# Patient Record
Sex: FEMALE | Race: WHITE | NOT HISPANIC OR LATINO | URBAN - METROPOLITAN AREA
[De-identification: names, ages, dates, MRNs, and addresses within clinical notes are randomized per-mention and may not be internally consistent; named-entity substitution may affect disease eponyms.]

---

## 2017-03-06 DIAGNOSIS — Z85.3 PERSONAL HISTORY OF MALIGNANT NEOPLASM OF BREAST: ICD-10-CM

## 2017-03-06 RX ORDER — ANASTROZOLE 1 MG/1
1 TABLET ORAL DAILY
Qty: 90 TABLET | Refills: 0 | Status: SHIPPED | OUTPATIENT
Start: 2017-03-06 | End: 2017-06-02

## 2017-04-18 ENCOUNTER — TRANSFERRED RECORDS (OUTPATIENT)
Dept: HEALTH INFORMATION MANAGEMENT | Facility: CLINIC | Age: 62
End: 2017-04-18

## 2017-05-23 ENCOUNTER — TRANSFERRED RECORDS (OUTPATIENT)
Dept: HEALTH INFORMATION MANAGEMENT | Facility: CLINIC | Age: 62
End: 2017-05-23

## 2017-06-02 DIAGNOSIS — Z85.3 PERSONAL HISTORY OF MALIGNANT NEOPLASM OF BREAST: ICD-10-CM

## 2017-06-02 RX ORDER — ANASTROZOLE 1 MG/1
1 TABLET ORAL DAILY
Qty: 30 TABLET | Refills: 0 | Status: SHIPPED | OUTPATIENT
Start: 2017-06-02 | End: 2017-06-23

## 2017-06-02 NOTE — TELEPHONE ENCOUNTER
Request for Anastrozole 1 mg tablet #90. Per OV note 12/23/16:    PLAN:   1.  Continue Arimidex  2. Switch to tamoxifen in 6 months.  3. Contact us sooner as necessary.     Lefty Matthew MD    Erx sent for #30 day supply, next office visit 6/23/17.    Anastrozole 1 mg     Last Written Prescription Date: 3/6/17  Last Fill Quantity: 90,  # refills: 0   Last Office Visit with [facility], [facility] or [facility] prescribing provider: 12/23/16 with Dr. Matthew

## 2017-06-23 ENCOUNTER — ONCOLOGY VISIT (OUTPATIENT)
Dept: ONCOLOGY | Facility: CLINIC | Age: 62
End: 2017-06-23
Attending: INTERNAL MEDICINE
Payer: COMMERCIAL

## 2017-06-23 VITALS
WEIGHT: 93.7 LBS | TEMPERATURE: 97.5 F | RESPIRATION RATE: 18 BRPM | BODY MASS INDEX: 17.24 KG/M2 | HEIGHT: 62 IN | OXYGEN SATURATION: 100 % | HEART RATE: 76 BPM

## 2017-06-23 DIAGNOSIS — Z85.3 PERSONAL HISTORY OF MALIGNANT NEOPLASM OF BREAST: ICD-10-CM

## 2017-06-23 PROCEDURE — 99213 OFFICE O/P EST LOW 20 MIN: CPT | Mod: ZF

## 2017-06-23 PROCEDURE — 99214 OFFICE O/P EST MOD 30 MIN: CPT | Mod: ZP | Performed by: INTERNAL MEDICINE

## 2017-06-23 RX ORDER — LIDOCAINE 50 MG/G
PATCH TOPICAL DAILY PRN
COMMUNITY
Start: 2016-07-29

## 2017-06-23 RX ORDER — CLOBETASOL PROPIONATE 0.5 MG/G
OINTMENT TOPICAL DAILY PRN
COMMUNITY
Start: 2016-04-01

## 2017-06-23 RX ORDER — ONDANSETRON 4 MG/1
4 TABLET, ORALLY DISINTEGRATING ORAL EVERY 6 HOURS PRN
Status: ON HOLD | COMMUNITY
Start: 2017-03-20 | End: 2024-01-01

## 2017-06-23 RX ORDER — TAMOXIFEN CITRATE 20 MG/1
20 TABLET ORAL DAILY
Qty: 90 TABLET | Refills: 3 | Status: SHIPPED | OUTPATIENT
Start: 2017-06-23 | End: 2018-07-11

## 2017-06-23 ASSESSMENT — PAIN SCALES - GENERAL: PAINLEVEL: NO PAIN (0)

## 2017-06-23 NOTE — LETTER
"6/23/2017       RE: Abigail Lam  [address]  [facility] [address]     Dear Colleague,    Thank you for referring your patient, Abigail Lam, to the [facility]. Please see a copy of my visit note below.    DIAGNOSIS:  History of Stage I, node negative, breast cancer.  It was multifocal.  ER, [place] positive, grade 1, negative for angiolymphatic invasion. One tumor was 2 cm, another was 0.5.  Had a low Oncotype score of 7.  She was treated with bilateral mastectomy, oophorectomy, started Tamoxifen in 2006 and took it for 5 years.  She has now been on extended endocrine therapy with Arimidex since June 2011.  She had been receiving q 6 month zolendronate but this was stopped Dec 2013 as it might have been causing her rib musculskeletal symptoms      INTERIM HISTORY:  Ms. Lam returns after 6 months.  Since I last saw her, she's done well.  Her GERD has been better.  She did not have the surgical procedure.    However, DEXA today shows decreasing bone density and she would like to switch to tamoxifen for this.  We talked about endometrial monitoring, and she understands it has limited benefit.    She also wonders if she has textured implants, will contact her reconstructive surgeon.    ROS remarkable for the persistent LUQ pain today.  It is fairly dull, and is not worse.       PHYSICAL EXAMINATION:   Pulse 76  Temp 97.5  F (36.4  C) (Oral)  Resp 18  Ht 1.575 m (5' 2.01\")  Wt 42.5 kg (93 lb 11.2 oz)  SpO2 100%  BMI 17.13 kg/m2    GENERAL:  She looks well today.  HEENT: Benign  LUNGS: Clear\  BACK: No tenderness  HEART: Normal hs  CHEST: Implants intact and in place.    ABDOMEN:  BS normal.  No mass or tenderness  NEURO: Grossly intact  EXTREMITIES: Normal    LABS:  Has outside DEXA today.  Shows decreasing bone density consistent with worsening osteoporosis.    PROBLEMS:   1.  History of ER positive breast cancer on extended therapy since about 02/2006. OncotypeDx score = 7  2.  " Musculoskeletal rib pain.   3.  Low bone density/Osteoporosis on outside scan   4. GERD better now.  5. LLQ abdominal pain - uncertain etiology     IMPRESSION:  Ms. Lam is interested in switching back to tamoxifen - especially given her bone density.  She will discuss with her gynecologist on whether she needs US to follow endometrium.h to tamoxifen.    PLAN:   1.  Switch to tamoxifen  2. RTC in 6 months.  3. Contact us sooner as necessary.    Lefty Matthew MD    Again, thank you for allowing me to participate in the care of your patient.      Sincerely,    Lefty Matthew MD

## 2017-06-23 NOTE — PROGRESS NOTES
"DIAGNOSIS:  History of Stage I, node negative, breast cancer.  It was multifocal.  ER, [place] positive, grade 1, negative for angiolymphatic invasion. One tumor was 2 cm, another was 0.5.  Had a low Oncotype score of 7.  She was treated with bilateral mastectomy, oophorectomy, started Tamoxifen in 2006 and took it for 5 years.  She has now been on extended endocrine therapy with Arimidex since June 2011.  She had been receiving q 6 month zolendronate but this was stopped Dec 2013 as it might have been causing her rib musculskeletal symptoms      INTERIM HISTORY:  Ms. Lam returns after 6 months.  Since I last saw her, she's done well.  Her GERD has been better.  She did not have the surgical procedure.    However, DEXA today shows decreasing bone density and she would like to switch to tamoxifen for this.  We talked about endometrial monitoring, and she understands it has limited benefit.    She also wonders if she has textured implants, will contact her reconstructive surgeon.    ROS remarkable for the persistent LUQ pain today.  It is fairly dull, and is not worse.       PHYSICAL EXAMINATION:   Pulse 76  Temp 97.5  F (36.4  C) (Oral)  Resp 18  Ht 1.575 m (5' 2.01\")  Wt 42.5 kg (93 lb 11.2 oz)  SpO2 100%  BMI 17.13 kg/m2    GENERAL:  She looks well today.  HEENT: Benign  LUNGS: Clear\  BACK: No tenderness  HEART: Normal hs  CHEST: Implants intact and in place.    ABDOMEN:  BS normal.  No mass or tenderness  NEURO: Grossly intact  EXTREMITIES: Normal    LABS:  Has outside DEXA today.  Shows decreasing bone density consistent with worsening osteoporosis.    PROBLEMS:   1.  History of ER positive breast cancer on extended therapy since about 02/2006. OncotypeDx score = 7  2.  Musculoskeletal rib pain.   3.  Low bone density/Osteoporosis on outside scan   4. GERD better now.  5. LLQ abdominal pain - uncertain etiology     IMPRESSION:  Ms. Lam is interested in switching back to tamoxifen - especially given her bone " density.  She will discuss with her gynecologist on whether she needs US to follow endometrium.h to tamoxifen.    PLAN:   1.  Switch to tamoxifen  2. RTC in 6 months.  3. Contact us sooner as necessary.    Lefty Matthew MD

## 2017-06-23 NOTE — LETTER
Date:June 27, 2017      Patient was self referred, no letter generated. Do not send.        [facility] Health Information

## 2017-06-23 NOTE — MR AVS SNAPSHOT
"              After Visit Summary   2017    Abigail Lam    MRN: [id number]           Patient Information     Date Of Birth          1955        Visit Information        Provider Department      2017 10:00 AM Lefty Matthew MD [facility]        Today's Diagnoses     Personal history of malignant neoplasm of breast           Follow-ups after your visit        Follow-up notes from your care team     Return in about 6 months (around 2017).      Who to contact     If you have questions or need follow up information about today's clinic visit or your schedule please contact [facility] directly at [phone].  Normal or non-critical lab and imaging results will be communicated to you by MyChart, letter or phone within 4 business days after the clinic has received the results. If you do not hear from us within 7 days, please contact the clinic through MyChart or phone. If you have a critical or abnormal lab result, we will notify you by phone as soon as possible.  Submit refill requests through [employer] or call your pharmacy and they will forward the refill request to us. Please allow 3 business days for your refill to be completed.          Additional Information About Your Visit        MyChart Information     Ideabove lets you send messages to your doctor, view your test results, renew your prescriptions, schedule appointments and more. To sign up, go to [email or web address] . Click on \"Log in\" on the left side of the screen, which will take you to the Welcome page. Then click on \"Sign up Now\" on the right side of the page.     You will be asked to enter the access code listed below, as well as some personal information. Please follow the directions to create your username and password.     Your access code is: [email or web address]  Expires: 2017  6:31 AM     Your access code will  in 90 days. If you need help or a new code, please call your [place] clinic " "or [phone].        Care EveryWhere ID     This is your Care EveryWhere ID. This could be used by other organizations to access your [place] medical records  [id number]        Your Vitals Were     Pulse Temperature Respirations Height Pulse Oximetry BMI (Body Mass Index)    76 97.5  F (36.4  C) (Oral) 18 1.575 m (5' 2.01\") 100% 17.13 kg/m2       Blood Pressure from Last 3 Encounters:   12/18/15 110/70   06/12/15 109/66   12/19/14 122/72    Weight from Last 3 Encounters:   06/23/17 42.5 kg (93 lb 11.2 oz)   12/23/16 40.6 kg (89 lb 9.6 oz)   09/30/16 40.6 kg (89 lb 9.6 oz)              Today, you had the following     No orders found for display         Today's Medication Changes          These changes are accurate as of: 6/23/17 11:09 AM.  If you have any questions, ask your nurse or doctor.               These medicines have changed or have updated prescriptions.        Dose/Directions    Fiber 0.52 G Caps   This may have changed:  Another medication with the same name was removed. Continue taking this medication, and follow the directions you see here.   Changed by:  Lefty Matthew MD        Dose:  1 tablet   Take 1 tablet by mouth daily   Refills:  0       ROLAIDS PO   This may have changed:  Another medication with the same name was removed. Continue taking this medication, and follow the directions you see here.   Changed by:  Gustabo Kemp MD        Dose:  750 mg   Take 750 mg by mouth 3 times daily   Refills:  0         Stop taking these medicines if you haven't already. Please contact your care team if you have questions.     anastrozole 1 MG tablet   Commonly known as:  ARIMIDEX   Stopped by:  Lefty Matthew MD                Where to get your medicines      These medications were sent to [facility] [address] [address] [facility] [address], [place] [address]     Phone:  [phone]     tamoxifen 20 MG tablet                Primary Care Provider    [facility] Other [facility] "                Equal Access to Services     [facility] : Hadii sofiya patel marciojuhi Neelimaali, watiffanyda luqterri, qasybilta kakaminidangelo harper. So [facility] [phone].    ATENCIÓN: Si habla español, tiene a cespedes disposición servicios gratuitos de asistencia lingüística. Llame al [phone].    We comply with applicable federal civil rights laws and [place] laws. We do not discriminate on the basis of race, color, national origin, age, disability sex, sexual orientation or gender identity.            Thank you!     Thank you for choosing [facility]IC  for your care. Our goal is always to provide you with excellent care. Hearing back from our patients is one way we can continue to improve our services. Please take a few minutes to complete the written survey that you may receive in the mail after your visit with us. Thank you!             Your Updated Medication List - Protect others around you: Learn how to safely use, store and throw away your medicines at www.disposemymeds.org.          This list is accurate as of: 6/23/17 11:09 AM.  Always use your most recent med list.                   Brand Name Dispense Instructions for use Diagnosis    ATIVAN 0.5 MG tablet   Generic drug:  LORazepam      Take 1 tablet by mouth as needed. Sleep/anxiety        atorvastatin 10 MG tablet    LIPITOR    90 tablet    Take 1 tablet (10 mg) by mouth every other day    Personal history of malignant neoplasm of breast       CARAFATE 1 GM/10ML suspension   Generic drug:  sucralfate      Take 1 g by mouth as needed. Up to 4 times a day        clobetasol 0.05 % ointment    TEMOVATE     Apply topically daily as needed        Fiber 0.52 G Caps      Take 1 tablet by mouth daily        lidocaine 5 % Patch    LIDODERM     as needed        LYSINE PO      Take  by mouth See Admin Instructions.        omeprazole-sodium bicarbonate  MG Caps per capsule           ondansetron 4 MG ODT tab     ZOFRAN-ODT     Take 4 mg by mouth every 6 hours as needed        ROLAIDS PO      Take 750 mg by mouth 3 times daily        SUDAFED PO      Take  by mouth as needed.        tamoxifen 20 MG tablet    NOLVADEX    90 tablet    Take 1 tablet (20 mg) by mouth daily    Personal history of malignant neoplasm of breast       VAGIFEM 10 MCG Tabs vaginal tablet   Generic drug:  estradiol      1 tablet every other day.        VITAMIN B-12 IJ      Inject 1,000 mcg as directed Monthly        vitamin D 2000 UNITS tablet      Take 1 tablet by mouth daily.        ZANTAC PO      Take 150 mg by mouth daily

## 2017-12-22 ENCOUNTER — ONCOLOGY VISIT (OUTPATIENT)
Dept: ONCOLOGY | Facility: CLINIC | Age: 62
End: 2017-12-22
Attending: INTERNAL MEDICINE
Payer: COMMERCIAL

## 2017-12-22 VITALS
TEMPERATURE: 97.7 F | RESPIRATION RATE: 16 BRPM | HEART RATE: 98 BPM | BODY MASS INDEX: 17.19 KG/M2 | DIASTOLIC BLOOD PRESSURE: 82 MMHG | HEIGHT: 62 IN | SYSTOLIC BLOOD PRESSURE: 147 MMHG | WEIGHT: 93.4 LBS | OXYGEN SATURATION: 99 %

## 2017-12-22 DIAGNOSIS — Z85.3 PERSONAL HISTORY OF MALIGNANT NEOPLASM OF BREAST: Primary | ICD-10-CM

## 2017-12-22 PROCEDURE — 40000114 ZZH STATISTIC NO CHARGE CLINIC VISIT

## 2017-12-22 PROCEDURE — 99214 OFFICE O/P EST MOD 30 MIN: CPT | Mod: ZP | Performed by: INTERNAL MEDICINE

## 2017-12-22 ASSESSMENT — PAIN SCALES - GENERAL: PAINLEVEL: MILD PAIN (3)

## 2017-12-22 NOTE — MR AVS SNAPSHOT
"              After Visit Summary   2017    Abigail Lam    MRN: [id number]           Patient Information     Date Of Birth          1955        Visit Information        Provider Department      2017 10:30 AM Lefty Matthew MD [facility]        Today's Diagnoses     Personal history of malignant neoplasm of breast    -  1       Follow-ups after your visit        Follow-up notes from your care team     Return in about 1 year (around 2018).      Who to contact     If you have questions or need follow up information about today's clinic visit or your schedule please contact [facility] directly at [phone].  Normal or non-critical lab and imaging results will be communicated to you by MyChart, letter or phone within 4 business days after the clinic has received the results. If you do not hear from us within 7 days, please contact the clinic through MyChart or phone. If you have a critical or abnormal lab result, we will notify you by phone as soon as possible.  Submit refill requests through [employer] or call your pharmacy and they will forward the refill request to us. Please allow 3 business days for your refill to be completed.          Additional Information About Your Visit        MyChart Information     [employer] lets you send messages to your doctor, view your test results, renew your prescriptions, schedule appointments and more. To sign up, go to [email or web address] . Click on \"Log in\" on the left side of the screen, which will take you to the Welcome page. Then click on \"Sign up Now\" on the right side of the page.     You will be asked to enter the access code listed below, as well as some personal information. Please follow the directions to create your username and password.     Your access code is: [email or web address]  Expires: 3/8/2018  6:30 AM     Your access code will  in 90 days. If you need help or a new code, please call your [place] " "clinic or [phone].        Care EveryWhere ID     This is your Care EveryWhere ID. This could be used by other organizations to access your [place] medical records  [id number]        Your Vitals Were     Pulse Temperature Respirations Height Pulse Oximetry BMI (Body Mass Index)    98 97.7  F (36.5  C) (Oral) 16 1.575 m (5' 2.01\") 99% 17.08 kg/m2       Blood Pressure from Last 3 Encounters:   12/22/17 147/82   12/18/15 110/70   06/12/15 109/66    Weight from Last 3 Encounters:   12/22/17 42.4 kg (93 lb 6.4 oz)   06/23/17 42.5 kg (93 lb 11.2 oz)   12/23/16 40.6 kg (89 lb 9.6 oz)              Today, you had the following     No orders found for display       Primary Care Provider    None Specified       No primary provider on file.        Equal Access to Services     [facility] : Hadii sofiya Alvarez, wamitra nelson, boston kaalmada isis, dangelo lindo . So [facility] [phone].    ATENCIÓN: Si habla español, tiene a cespedes disposición servicios gratuitos de asistencia lingüística. Rhianna al [phone].    We comply with applicable federal civil rights laws and [place] laws. We do not discriminate on the basis of race, color, national origin, age, disability, sex, sexual orientation, or gender identity.            Thank you!     Thank you for choosing [facility]ER [facility]  for your care. Our goal is always to provide you with excellent care. Hearing back from our patients is one way we can continue to improve our services. Please take a few minutes to complete the written survey that you may receive in the mail after your visit with us. Thank you!             Your Updated Medication List - Protect others around you: Learn how to safely use, store and throw away your medicines at www.disposemymeds.org.          This list is accurate as of: 12/22/17 12:30 PM.  Always use your most recent med list.                   Brand Name Dispense Instructions for use Diagnosis "    ATIVAN 0.5 MG tablet   Generic drug:  LORazepam      Take 1 tablet by mouth as needed. Sleep/anxiety        atorvastatin 10 MG tablet    LIPITOR    90 tablet    Take 1 tablet (10 mg) by mouth every other day    Personal history of malignant neoplasm of breast       CARAFATE 1 GM/10ML suspension   Generic drug:  sucralfate      Take 1 g by mouth as needed. Up to 4 times a day        clobetasol 0.05 % ointment    TEMOVATE     Apply topically daily as needed        Fiber 0.52 G Caps      Take 1 tablet by mouth daily        lidocaine 5 % Patch    LIDODERM     as needed        LYSINE PO      Take  by mouth See Admin Instructions.        omeprazole-sodium bicarbonate  MG Caps per capsule           ondansetron 4 MG ODT tab    ZOFRAN-ODT     Take 4 mg by mouth every 6 hours as needed        ROLAIDS PO      Take 750 mg by mouth 3 times daily        SUDAFED PO      Take  by mouth as needed.        tamoxifen 20 MG tablet    NOLVADEX    90 tablet    Take 1 tablet (20 mg) by mouth daily    Personal history of malignant neoplasm of breast       VAGIFEM 10 MCG Tabs vaginal tablet   Generic drug:  estradiol      1 tablet every other day.        VITAMIN B-12 IJ      Inject 1,000 mcg as directed Monthly        vitamin D 2000 UNITS tablet      Take 1 tablet by mouth daily.        ZANTAC PO      Take 150 mg by mouth daily
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
## 2017-12-22 NOTE — LETTER
Date:December 29, 2017      Patient was self referred, no letter generated. Do not send.        [facility] Health Information
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
## 2018-02-12 ENCOUNTER — CARE COORDINATION (OUTPATIENT)
Dept: ONCOLOGY | Facility: CLINIC | Age: 63
End: 2018-02-12

## 2018-02-12 NOTE — PROGRESS NOTES
Patient called to arrange a follow-up appointment with Dr Matthew in six months from last visit December 22,17. Patient is requesting Nohelia 15, 18 at 10:30 and have held time and will send a message to scheduling with held time in Dr Matthew's schedule as requested. Answered all patient's questions and verbalized understanding. Liss Campos RN, BSN.

## 2018-05-21 ENCOUNTER — TRANSFERRED RECORDS (OUTPATIENT)
Dept: HEALTH INFORMATION MANAGEMENT | Facility: CLINIC | Age: 63
End: 2018-05-21

## 2018-06-15 ENCOUNTER — ONCOLOGY VISIT (OUTPATIENT)
Dept: ONCOLOGY | Facility: CLINIC | Age: 63
End: 2018-06-15
Attending: INTERNAL MEDICINE
Payer: COMMERCIAL

## 2018-06-15 VITALS
TEMPERATURE: 97.9 F | WEIGHT: 93.13 LBS | BODY MASS INDEX: 17.14 KG/M2 | DIASTOLIC BLOOD PRESSURE: 79 MMHG | HEIGHT: 62 IN | HEART RATE: 89 BPM | SYSTOLIC BLOOD PRESSURE: 130 MMHG | OXYGEN SATURATION: 98 % | RESPIRATION RATE: 16 BRPM

## 2018-06-15 DIAGNOSIS — Z85.3 PERSONAL HISTORY OF MALIGNANT NEOPLASM OF BREAST: Primary | ICD-10-CM

## 2018-06-15 PROCEDURE — 99214 OFFICE O/P EST MOD 30 MIN: CPT | Mod: ZP | Performed by: INTERNAL MEDICINE

## 2018-06-15 PROCEDURE — G0463 HOSPITAL OUTPT CLINIC VISIT: HCPCS | Mod: ZF

## 2018-06-15 RX ORDER — FLUTICASONE PROPIONATE 50 MCG
1 SPRAY, SUSPENSION (ML) NASAL DAILY PRN
COMMUNITY

## 2018-06-15 RX ORDER — HYOSCYAMINE SULFATE 0.125 MG
TABLET ORAL
Status: ON HOLD | COMMUNITY
Start: 2018-06-04 | End: 2024-01-01

## 2018-06-15 ASSESSMENT — PAIN SCALES - GENERAL: PAINLEVEL: NO PAIN (0)

## 2018-06-15 NOTE — PROGRESS NOTES
"DIAGNOSIS:  History of Stage I, node negative, breast cancer.  It was multifocal.  ER, VA positive, grade 1, negative for angiolymphatic invasion. One tumor was 2 cm, another was 0.5.  Had a low Oncotype score of 7.  She was treated with bilateral mastectomy, oophorectomy, started Tamoxifen in 2006 and took it for 5 years.  She has now been on extended endocrine therapy with Arimidex since June 2011.  She had been receiving q 6 month zolendronate but this was stopped Dec 2013 as it might have been causing her rib musculskeletal symptoms.  She switched to tamoxifen in June 2017.     INTERIM HISTORY:  Ms. Lam returns after 6 months.  Since I last saw her, she had vaginal US which showed increasing endometrial thickness to 9mm.  She also has fibroids.  She has no bleeding.  Because of this her gynecologist is planning a vaginal hysterectomy for next week.    She also has learned that she has textured implants.  She saw Dr. Ugarte who suggested he could replace them at the same time as her hysterectomy.  She is reluctant to do this.  She does think her left breast is a little larger.    GERD is now well controlled.    Her  is retiring this year.  She will then be spending more time in [place].    Otherwise no new symptoms.      ROS otherwise unremarkable.       PHYSICAL EXAMINATION:   /79  Pulse 89  Temp 97.9  F (36.6  C) (Oral)  Resp 16  Ht 1.575 m (5' 2\")  Wt 42.2 kg (93 lb 2 oz)  SpO2 98%  Breastfeeding? No  BMI 17.03 kg/m2    GENERAL:  She looks well today. No distress  HEENT: Benign  LUNGS: Clear  BACK: No tenderness to palpation.  Subjective tenderness right low back off midline, not in bone.  HEART: Normal HS, no murmur  CHEST: Implants intact and in place.  I don't see any swelling, but there is a little difference in her lower pole of the left breast  ABDOMEN:  BS normal.  No mass or tenderness  NEURO: Grossly intact  EXTREMITIES: Normal    LABS:  None drawn.    PROBLEMS:   1.  History of ER " positive breast cancer on extended therapy since about 02/2006. OncotypeDx score = 7  2.  Musculoskeletal rib pain.   3.  Low bone density/Osteoporosis on outside scan   4. H/O GERD better now.  5. Increased endometrial thickness planning hysterectomy  6. Textured implants     IMPRESSION:  Ms. Lam is tolerating the tamoxifen.  We will get a breast MRI as a screen    PLAN:   1. Continue tamoxifen  2. RTC in 6 months.  3. Contact us sooner as necessary.  4. Gynecologist to perform hysterectomy  5. Schedule breast MRI for screening    Lefty Matthew MD

## 2018-06-15 NOTE — LETTER
"6/15/2018       RE: Abigail Lam  [address]  [facility] [address]     Dear Colleague,    Thank you for referring your patient, Abigail Lam, to the [facility]. Please see a copy of my visit note below.    DIAGNOSIS:  History of Stage I, node negative, breast cancer.  It was multifocal.  ER, NM positive, grade 1, negative for angiolymphatic invasion. One tumor was 2 cm, another was 0.5.  Had a low Oncotype score of 7.  She was treated with bilateral mastectomy, oophorectomy, started Tamoxifen in 2006 and took it for 5 years.  She has now been on extended endocrine therapy with Arimidex since June 2011.  She had been receiving q 6 month zolendronate but this was stopped Dec 2013 as it might have been causing her rib musculskeletal symptoms.  She switched to tamoxifen in June 2017.     INTERIM HISTORY:  Ms. Lam returns after 6 months.  Since I last saw her, she had vaginal US which showed increasing endometrial thickness to 9mm.  She also has fibroids.  She has no bleeding.  Because of this her gynecologist is planning a vaginal hysterectomy for next week.    She also has learned that she has textured implants.  She saw Dr. Ugarte who suggested he could replace them at the same time as her hysterectomy.  She is reluctant to do this.  She does think her left breast is a little larger.    GERD is now well controlled.    Her  is retiring this year.  She will then be spending more time in [place].    Otherwise no new symptoms.      ROS otherwise unremarkable.       PHYSICAL EXAMINATION:   /79  Pulse 89  Temp 97.9  F (36.6  C) (Oral)  Resp 16  Ht 1.575 m (5' 2\")  Wt 42.2 kg (93 lb 2 oz)  SpO2 98%  Breastfeeding? No  BMI 17.03 kg/m2    GENERAL:  She looks well today. No distress  HEENT: Benign  LUNGS: Clear  BACK: No tenderness to palpation.  Subjective tenderness right low back off midline, not in bone.  HEART: Normal HS, no murmur  CHEST: Implants intact and in place.  I " don't see any swelling, but there is a little difference in her lower pole of the left breast  ABDOMEN:  BS normal.  No mass or tenderness  NEURO: Grossly intact  EXTREMITIES: Normal    LABS:  None drawn.    PROBLEMS:   1.  History of ER positive breast cancer on extended therapy since about 02/2006. OncotypeDx score = 7  2.  Musculoskeletal rib pain.   3.  Low bone density/Osteoporosis on outside scan   4. H/O GERD better now.  5. Increased endometrial thickness planning hysterectomy  6. Textured implants     IMPRESSION:  Ms. Lam is tolerating the tamoxifen.  We will get a breast MRI as a screen    PLAN:   1. Continue tamoxifen  2. RTC in 6 months.  3. Contact us sooner as necessary.  4. Gynecologist to perform hysterectomy  5. Schedule breast MRI for screening    Lefty Matthew MD    Again, thank you for allowing me to participate in the care of your patient.      Sincerely,    Lefty Matthew MD

## 2018-06-15 NOTE — MR AVS SNAPSHOT
After Visit Summary   6/15/2018    Abigail Lam    MRN: [id number]           Patient Information     Date Of Birth          1955        Visit Information        Provider Department      6/15/2018 10:30 AM Lefty Matthew MD [facility]        Today's Diagnoses     Personal history of malignant neoplasm of breast    -  1       Follow-ups after your visit        Follow-up notes from your care team     Return in about 6 months (around 12/15/2018).      Your next 10 appointments already scheduled     Aug 06, 2018  4:00 PM CDT   (Arrive by 3:30 PM)   MR BREAST BILATERAL W/O & W CONTRAST with [address] [facility] ([facility])    [address] [address] [phone]           Take your medicines as usual, unless your doctor tells you not to. Bring a list of your current medicines to your exam (including vitamins, minerals and over-the-counter drugs).  The timing of your exam may depend on the start of your last period. If you re in menopause, you may have the exam anytime.  Please bring any previous mammograms or breast MRIs from other facilities to the MRI dept. Do not mail these items to us.   You will have IV contrast for this exam.  You do not need to do anything special to prepare.  The MRI machine uses a strong magnet. Please wear clothes without metal (snaps, zippers). A sweatsuit works well, or we may give you a hospital gown.  Please remove any body piercings and hair extensions before you arrive. You will also remove watches, jewelry, hairpins, wallets, dentures, partial dental plates and hearing aids. You may wear contact lenses, and you may be able to wear your rings. We have a safe place to keep your personal items, but it is safer to leave them at home.  **IMPORTANT** THE INSTRUCTIONS BELOW ARE ONLY FOR THOSE PATIENTS WHO HAVE BEEN PRESCRIBED SEDATION OR GENERAL ANESTHESIA DURING THEIR MRI  PROCEDURE:  IF YOUR DOCTOR PRESCRIBED ORAL SEDATION (take medicine to help you relax during your exam):   You must get the medicine from your doctor (oral medication) before you arrive. Bring the medicine to the exam. Do not take it at home. You ll be told when to take it upon arriving for your exam.   Arrive one hour early. Bring someone who can take you home after the test. Your medicine will make you sleepy. After the exam, you may not drive, take a bus or take a taxi by yourself.  IF YOUR DOCTOR PRESCRIBED IV SEDATION:   Arrive one hour early. Bring someone who can take you home after the test. Your medicine will make you sleepy. After the exam, you may not drive, take a bus or take a taxi by yourself.   No eating 6 hours before your exam. You may have clear liquids up until 4 hours before your exam. (Clear liquids include water, clear tea, black coffee and fruit juice without pulp.)  IF YOUR DOCTOR PRESCRIBED ANESTHESIA (be asleep for your exam):   Arrive 1 1/2 hours early. Bring someone who can take you home after the test. You may not drive, take a bus or take a taxi by yourself.   No eating 8 hours before your exam. You may have clear liquids up until 4 hours before your exam. (Clear liquids include water, clear tea, black coffee and fruit juice without pulp.)   You will spend four to five hours in the recovery room.  If you have any questions, please contact your Imaging Department exam site.            Dec 21, 2018 10:00 AM CST   (Arrive by 9:45 AM)   Return Visit with Lefty Matthew MD   [facility] ([facility])    [address] [place] [address]  [address]  [facility] [address] [phone]              Future tests that were ordered for you today     Open Future Orders        Priority Expected Expires Ordered    MRI Breast bilateral w & w/o contrast Routine  5/15/2019 6/15/2018            Who to contact     If you have questions or need follow up information about  "today's clinic visit or your schedule please contact [facility] directly at [phone].  Normal or non-critical lab and imaging results will be communicated to you by be2hart, letter or phone within 4 business days after the clinic has received the results. If you do not hear from us within 7 days, please contact the clinic through be2hart or phone. If you have a critical or abnormal lab result, we will notify you by phone as soon as possible.  Submit refill requests through [email or web address] or call your pharmacy and they will forward the refill request to us. Please allow 3 business days for your refill to be completed.          Additional Information About Your Visit        be2hart Information     [email or web address] lets you send messages to your doctor, view your test results, renew your prescriptions, schedule appointments and more. To sign up, go to [email or web address] . Click on \"Log in\" on the left side of the screen, which will take you to the Welcome page. Then click on \"Sign up Now\" on the right side of the page.     You will be asked to enter the access code listed below, as well as some personal information. Please follow the directions to create your username and password.     Your access code is: [email or web address]  Expires: 2018  6:30 AM     Your access code will  in 90 days. If you need help or a new code, please call your [facility] clinic or [phone].        Care EveryWhere ID     This is your Care EveryWhere ID. This could be used by other organizations to access your [facility] medical records  [id number]        Your Vitals Were     Pulse Temperature Respirations Height Pulse Oximetry Breastfeeding?    89 97.9  F (36.6  C) (Oral) 16 1.575 m (5' 2\") 98% No    BMI (Body Mass Index)                   17.03 kg/m2            Blood Pressure from Last 3 Encounters:   06/15/18 130/79   17 147/82   12/18/15 110/70    Weight from Last 3 Encounters:   06/15/18 42.2 kg (93 lb 2 oz) "   12/22/17 42.4 kg (93 lb 6.4 oz)   06/23/17 42.5 kg (93 lb 11.2 oz)               Primary Care Provider Office Phone # Fax #    Lizette Finn [phone]       [facility] [address]  [facility] [address]        Equal Access to Services     NATHANAEL DIAZ : Hadii aad ku hadasho Soomaali, waaxda luqadaha, qaybta kaalmada adeegyada, waxay idiin hayaan adeeg khjazzysh lakavon . So [facility] [phone].    ATENCIÓN: Si habla español, tiene a cespedes disposición servicios gratuitos de asistencia lingüística. Rhianna al [phone].    We comply with applicable federal civil rights laws and [place] laws. We do not discriminate on the basis of race, color, national origin, age, disability, sex, sexual orientation, or gender identity.            Thank you!     Thank you for choosing [facility]  for your care. Our goal is always to provide you with excellent care. Hearing back from our patients is one way we can continue to improve our services. Please take a few minutes to complete the written survey that you may receive in the mail after your visit with us. Thank you!             Your Updated Medication List - Protect others around you: Learn how to safely use, store and throw away your medicines at www.disposemymeds.org.          This list is accurate as of 6/15/18 11:34 AM.  Always use your most recent med list.                   Brand Name Dispense Instructions for use Diagnosis    ATIVAN 0.5 MG tablet   Generic drug:  LORazepam      Take 1 tablet by mouth as needed. Sleep/anxiety        atorvastatin 10 MG tablet    LIPITOR    90 tablet    Take 1 tablet (10 mg) by mouth every other day    Personal history of malignant neoplasm of breast       CARAFATE 1 GM/10ML suspension   Generic drug:  sucralfate      Take 1 g by mouth as needed. Up to 4 times a day        clobetasol 0.05 % ointment    TEMOVATE     Apply topically daily as needed        Fiber 0.52 g Caps      Take 1 tablet  by mouth daily        fluticasone 50 MCG/ACT spray    FLONASE     Spray 1 Inhaler in nostril        hyoscyamine 0.125 MG tablet    ANASPAZ/LEVSIN          lidocaine 5 % Patch    LIDODERM     as needed        * LYSINE PO      Take  by mouth See Admin Instructions.        * Lysine 500 MG Caps      Take 500 mg by mouth        omeprazole-sodium bicarbonate  MG Caps per capsule           ondansetron 4 MG ODT tab    ZOFRAN-ODT     Take 4 mg by mouth every 6 hours as needed        ROLAIDS PO      Take 750 mg by mouth 3 times daily        SUDAFED PO      Take  by mouth as needed.        tamoxifen 20 MG tablet    NOLVADEX    90 tablet    Take 1 tablet (20 mg) by mouth daily    Personal history of malignant neoplasm of breast       VAGIFEM 10 MCG Tabs vaginal tablet   Generic drug:  estradiol      1 tablet every other day.        VITAMIN B-12 IJ      Inject 1,000 mcg as directed Monthly        vitamin D 2000 units tablet      Take 1 tablet by mouth daily.        ZANTAC PO      Take 150 mg by mouth daily        * Notice:  This list has 2 medication(s) that are the same as other medications prescribed for you. Read the directions carefully, and ask your doctor or other care provider to review them with you.

## 2018-06-15 NOTE — NURSING NOTE
"Oncology Rooming Note    Nohelia 15, 2018 10:36 AM   Abigail Lam is a 63 year old female who presents for:    Chief Complaint   Patient presents with     Oncology Clinic Visit     Return: Breast [place]     Initial Vitals: /79  Pulse 89  Temp 97.9  F (36.6  C) (Oral)  Resp 16  Ht 1.575 m (5' 2\")  Wt 42.2 kg (93 lb 2 oz)  SpO2 98%  Breastfeeding? No  BMI 17.03 kg/m2 Estimated body mass index is 17.03 kg/(m^2) as calculated from the following:    Height as of this encounter: 1.575 m (5' 2\").    Weight as of this encounter: 42.2 kg (93 lb 2 oz). Body surface area is 1.36 meters squared.  No Pain (0) Comment: Data Unavailable   No LMP recorded. Patient is not currently having periods (Reason: UNKNOWN).  Allergies reviewed: Yes  Medications reviewed: Yes    Medications: Medication refills not needed today.  Pharmacy name entered into [employer]:    [id number] [place] [facility] - [place], [place] - [address] [facility] - [place] - [address] [facility] [address] [place] [address] - [address] [facility] [address] [facility] & [facility]    Clinical concerns: new concerns will discuss with Dr. Tiff Matthew was notified.    10 minutes for nursing intake (face to face time)     Karan Ohara CMA              "

## 2018-06-22 ENCOUNTER — TRANSFERRED RECORDS (OUTPATIENT)
Dept: HEALTH INFORMATION MANAGEMENT | Facility: CLINIC | Age: 63
End: 2018-06-22

## 2018-07-11 ENCOUNTER — TELEPHONE (OUTPATIENT)
Dept: ONCOLOGY | Facility: CLINIC | Age: 63
End: 2018-07-11

## 2018-07-11 DIAGNOSIS — Z85.3 PERSONAL HISTORY OF MALIGNANT NEOPLASM OF BREAST: ICD-10-CM

## 2018-07-11 RX ORDER — TAMOXIFEN CITRATE 20 MG/1
20 TABLET ORAL DAILY
Qty: 90 TABLET | Refills: 3 | Status: SHIPPED | OUTPATIENT
Start: 2018-07-11 | End: 2018-08-13

## 2018-07-11 NOTE — TELEPHONE ENCOUNTER
[facility] Call Center    Phone Message    May a detailed message be left on voicemail: no    Reason for Call: Other: pharmacy calling on the status of the refill for tamoxifen (NOLVADEX) 20 MG tablet. Please call them back or respond to the fax     Action Taken: Message routed to:  [facility] ([facility]

## 2018-07-26 ENCOUNTER — ONCOLOGY VISIT (OUTPATIENT)
Dept: ONCOLOGY | Facility: CLINIC | Age: 63
End: 2018-07-26
Attending: OBSTETRICS & GYNECOLOGY
Payer: COMMERCIAL

## 2018-07-26 VITALS
TEMPERATURE: 96.9 F | RESPIRATION RATE: 18 BRPM | BODY MASS INDEX: 16.9 KG/M2 | WEIGHT: 92.37 LBS | OXYGEN SATURATION: 98 % | HEART RATE: 99 BPM

## 2018-07-26 DIAGNOSIS — G89.18 ACUTE POST-OPERATIVE PAIN: ICD-10-CM

## 2018-07-26 DIAGNOSIS — Z85.3 PERSONAL HISTORY OF MALIGNANT NEOPLASM OF BREAST: Primary | ICD-10-CM

## 2018-07-26 PROCEDURE — 99203 OFFICE O/P NEW LOW 30 MIN: CPT | Mod: ZP | Performed by: OBSTETRICS & GYNECOLOGY

## 2018-07-26 PROCEDURE — G0463 HOSPITAL OUTPT CLINIC VISIT: HCPCS | Mod: ZF

## 2018-07-26 ASSESSMENT — PAIN SCALES - GENERAL: PAINLEVEL: MILD PAIN (3)

## 2018-07-26 NOTE — NURSING NOTE
"Oncology Rooming Note    July 26, 2018 10:46 AM   Abigail Lam is a 63 year old female who presents for:    Chief Complaint   Patient presents with     Oncology Clinic Visit     Return for      Initial Vitals: Pulse 99  Temp 96.9  F (36.1  C) (Oral)  Resp 18  Wt 41.9 kg (92 lb 6 oz)  SpO2 98%  BMI 16.9 kg/m2 Estimated body mass index is 16.9 kg/(m^2) as calculated from the following:    Height as of 6/15/18: 1.575 m (5' 2\").    Weight as of this encounter: 41.9 kg (92 lb 6 oz). Body surface area is 1.35 meters squared.  Mild Pain (3) Comment: Data Unavailable   No LMP recorded. Patient is not currently having periods (Reason: UNKNOWN).  Allergies reviewed: Yes  Medications reviewed: Yes    Medications: Medication refills not needed today.  Pharmacy name entered into [employer]:    [id number] [place] [facility] [place], [place] - [address] [facility] - [place], [place] - [address] [facility] [id number] [place] [address] - [address] [place] [address] [address] [facility]    Clinical concerns: results  Krystle was notified.    8  minutes for nursing intake (face to face time)     Humaira Leiva MA              "

## 2018-07-26 NOTE — PATIENT INSTRUCTIONS
Get records from recent surgery at [facility]  Follow up with benign gyn in 6 months

## 2018-07-26 NOTE — NURSING NOTE
No need to return to gyn oncology  See benign gyn in 6 months  MIN signed to get records from recent surgery  8/6/18  Records requested from surgery 4-5 weeks ago at [facility]

## 2018-07-26 NOTE — PROGRESS NOTES
Gynecology Oncology Clinic  Consult Notes on Referred Patient    RE: Abigail Lam  : 1955  DANIEL: 2018    Abigail Lam is a very pleasant 63 year old woman with a recent diagnosis of thickened endometrial stripe.  Given these findings, she was subsequently desired a second opinion and follow up at the Gynecologic Cancer Clinic. She is a friend of Dr. Pritchard.    HPI: abigail presents to clinic today feeling well. She reports intermittent vaginal bleeding, pt is post-menopausal. No abnormal discharge. She was concerned with possible hernia along RLQ following surgery she had at [facility]. She is otherwise doing well. She is eating and drinking well. No bowel or urinary issues. RLQ pain when having bowel movement. She has noted that she is passing gas more frequently, she is taking simethicone and colace. No lower extremity pain or edema. No numbness or tingling.     2005: history of ER, TN+ grade 1 right DCIS and concurrent lobular carcinoma in situ of the left breast, s/p a bilateral mastectomy     On tamoxifen from  to .   Zoledronate q 6 months stopped in Dec 2013 due to msk symptoms  On Arimidex from 2011 to 2017  Started on tamoxifen in 2017: TVUS with endometrial thicnkess of 5.5 mm    1/10/2018: seen for recent US showed thickened endometrial lining    2018: US with thickened endometrial lining (9.1mm) Uterus 6.9x3.9x4.8, 4 fibroids-stable, ovaries surgically absent  - EBM benign    2018: LAVH/BS by Dr. Triana.   Intraoperative findings:  1. Small, mobile uterus.  2. Surgically absent fallopian tubes and ovaries bilaterally.  3. Small filmy adhesions between sigmoid colon and left pelvic sidewall.  4. Normal upper abdominal survey.  5. Normal appearing appendix.  6. Gross pathologic evaluation of the endometrial cavity demonstrated no focal abnormalities, no evidence of grossly thickened endometrium.    Waiting for final pathology report from [facility]  however patient told benign.      ObGyn history:    Menarche: 15 y/o  LMP:   Age at menopause: 50  History of OCPs: None  History of HRT: None  Dysplasia: Reports normal pap smears, last performed 2017 and normal per patient       Review of Systems:  Systemic           no weight changes; no fever; no chills; no night sweats; no appetite changes  Skin           no rashes, or lesions  Eye           no irritation; no changes in vision  Melissa-Laryngeal           no dysphagia; no hoarseness   Pulmonary    no cough; no shortness of breath  Cardiovascular    no chest pain; no palpitations  Gastrointestinal    no diarrhea; no constipation; no abdominal pain; no changes in bowel  habits; no blood in stool  Genitourinary   no urinary frequency; no urinary urgency; no dysuria; no pain; no abnormal vaginal discharge; no abnormal vaginal bleeding  Breast    no breast discharge; no breast changes; no breast pain  Musculoskeletal    no myalgias; no arthralgias; no back pain  Psychiatric           no depressed mood; no anxiety    Hematologic           no tender lymph nodes; no noticeable swellings or lumps   Endocrine    no hot flashes; no heat/cold intolerance         Neurological   no tremor; no numbness and tingling; no headaches; no difficulty  sleeping    I have reviewed and addressed the patient's review of symptoms for today's visit.     Past Medical History:  Esophageal reflux   Gastroesophageal Reflux   Disorder of bone and cartilage, unspecified       Malignant neoplasm of breast (female), unspecified site   Breast Cancer, Female, right   Scoliosis       Past Surgical History:  laproscopy for infertility workup.     Pelvic laparoscopy (no surgical intervention)   Bilateral MRM's 10/01/06   bilateral   BSO.  05   Salpingo-Oophorectomy   COLONOSCOPY SCREENING ,    diverticulosis   MASTECTOMY   Bilateral with reconstruction   CT CARDIAC CORONARY ARTERIES DUAL READ        Health Maintenance:  Health  Maintenance Due   Topic Date Due     PHQ-2 Q1 YR  1967     TETANUS IMMUNIZATION (SYSTEM ASSIGNED)  1973     HIV SCREEN (SYSTEM ASSIGNED)  1973     HEPATITIS C SCREENING  1973     PAP SCREENING Q3 YR (SYSTEM ASSIGNED)  1976     MAMMO SCREEN Q2 YR (SYSTEM ASSIGNED)  2005     ADVANCE DIRECTIVE PLANNING Q5 YRS  2010     Last Pap Smear:               Result: normal  She has not had a history of abnormal Pap smears.    Last Mammogram: S/p bilateral mastectomy in               Result:       She has had a history of abnormal mammograms.    Last Colonoscopy: 2017              Result: normal                    Last DEXA Scan:               Result: abnormal: osteopenia and taking Zometa, Vit D, and Calcium    Last Diabetes Screening;     Last Thyroid Screenin/20/2013    Last Cholest Screenin/18/2015    Current Medications:   has a current medication list which includes the following prescription(s): atorvastatin, ca carbonate-mag hydroxide, vitamin d, clobetasol, cyanocobalamin, estradiol, fluticasone, hyoscyamine, lidocaine, lorazepam, lysine, lysine, omeprazole-sodium bicarbonate, ondansetron, pseudoephedrine hcl, fiber, ranitidine hcl, sucralfate, and tamoxifen.     Allergies:      Allergies   Allergen Reactions     Diphtheria Toxoid-Tetanus Tox-Thimerosal      Other reaction(s): Edema     Ketorolac      Other reaction(s): Hypertension     Ketorolac Tromethamine      Other reaction(s): Other (See Comments)  Creates high blood pressure     Toradol         Social History:  Social History   Substance Use Topics     Smoking status: Never Smoker     Smokeless tobacco: Never Used     Alcohol use Not on file       History   Drug Use Not on file     Family History:   The patient's family history is notable for:  Family History   Problem Relation Age of Onset     Macular Degeneration Mother      Glaucoma No family hx of      Father: heart disease,  "bladder cancer (85 y/o)  Paternal grandfather: heart attach (73 y/o)  Mother: \"inflammatory breast cancer\" (77 y/o)  Maternal grandmother: Pancreatic or ovarian cancer  Daughter: Grave's disease  No hx of colon, uterine, cervical cancer    Physical Exam:   Pulse 99  Temp 96.9  F (36.1  C) (Oral)  Resp 18  Wt 41.9 kg (92 lb 6 oz)  SpO2 98%  BMI 16.9 kg/m2  Body mass index is 16.9 kg/(m^2).    General Appearance- healthy and alert, no distress  HEENT- no thyromegaly, no palpable nodules or masses   Cardiovascular- regular rate and rhythm, no gallops, rubs or murmurs   Respiratory- lungs clear, no rales, rhonchi or wheezes, normal diaphragmatic excursion  Musculoskeletal- extremities non tender and without edema  Skin- no lesions or rashes   Neurological- normal gait, no gross defects  Psychiatric- appropriate mood and affect           Hematologica- normal cervical, supraclavicular and inguinal lymph nodes  Gastrointestinal- abdomen soft, some inflammation palpable around incision site in RLQ, size of a marble, indentation around incsion site when patient is standing up, not concerning for hernia.   Genitourinary- Pt declined pelvic exam.       Assessment:  Abigail Lam is a 63 year old woman with a new diagnosis of thickened endometrial strip on tamoxifen, now s/p LAVH on 6/22/2018. Waiting on final pathology report from [facility].      Plan:   1.)   Thickened endometrial stripe: pt s/p hysterectomy done by Dr. Triana at [facility]. No evidence of carcinoma based on patient report.   Patient needs to establish care with general ObGyn here in [place]. Reviewed signs and symptoms for when she should contact the clinic or seek additional care. Patient to contact the clinic with any questions or concerns in the interim.     2.) Genetic risk factors were assessed and the patient does not meet the qualifications for a referral.      3.) Labs and/or tests ordered include:  None.      4.) Health maintenance issues " addressed today include: pt up to date with colonoscopy and DEXA scan.     5.) Incision site: instructed patient to ice area and take ibuprofen every 4-6 hours for a few days to alleviate inflammation and discomfort. Pt can massage area as tolerated.     Thank you for allowing us to participate in the care of your patient.       Sincerely,    Sravanthi Dalton MD    Department of Ob/Gyn and Women's Health  Division of Gynecologic Oncology  [facility]  [phone]    I, Renan Collazo, am serving as a scribe to document services personally performed by Sravanthi Dalton MD, based upon my observations and the provider's statements to me. All documentation has been reviewed by the aforementioned doctor prior to being entered into the official medical record.     I have reviewed the above note and agree with the scribe's notation as written.

## 2018-07-26 NOTE — MR AVS SNAPSHOT
After Visit Summary   7/26/2018    Abigail Lam    MRN: [id number]           Patient Information     Date Of Birth          1955        Visit Information        Provider Department      7/26/2018 10:40 AM Sravanthi Dalton MD [facility]        Today's Diagnoses     Personal history of malignant neoplasm of breast    -  1    Acute post-operative pain          Care Instructions    Get records from recent surgery at [facility]  Follow up with benign gyn in 6 months          Follow-ups after your visit        Your next 10 appointments already scheduled     Dec 21, 2018 10:00 AM CST   (Arrive by 9:45 AM)   Return Visit with Lefty Matthew MD   [facility]    [address]  [address]  [place] [address] [phone]              Who to contact     If you have questions or need follow up information about today's clinic visit or your schedule please contact [facility] directly at [phone].  Normal or non-critical lab and imaging results will be communicated to you by MyChart, letter or phone within 4 business days after the clinic has received the results. If you do not hear from us within 7 days, please contact the clinic through MyChart or phone. If you have a critical or abnormal lab result, we will notify you by phone as soon as possible.  Submit refill requests through [employer] or call your pharmacy and they will forward the refill request to us. Please allow 3 business days for your refill to be completed.          Additional Information About Your Visit        Care EveryWhere ID     This is your Care EveryWhere ID. This could be used by other organizations to access your [place] medical records  [id number]        Your Vitals Were     Pulse Temperature Respirations Pulse Oximetry BMI (Body Mass Index)       99 96.9  F (36.1  C) (Oral) 18 98% 16.9 kg/m2        Blood  Pressure from Last 3 Encounters:   06/15/18 130/79   12/22/17 147/82   12/18/15 110/70    Weight from Last 3 Encounters:   07/26/18 41.9 kg (92 lb 6 oz)   06/15/18 42.2 kg (93 lb 2 oz)   12/22/17 42.4 kg (93 lb 6.4 oz)              Today, you had the following     No orders found for display       Primary Care Provider Office Phone # Fax #    Lizette Finn [phone]       [facility] [address] [facility] [address]        Equal Access to Services     [facility] : Hadii aad ku hadasho Sonathanali, waaxda luqadaha, qaybta kaalmada adeegyada, dangelo lindo . So [facility] [phone].    ATENCIÓN: Si habla español, tiene a cespedes disposición servicios gratuitos de asistencia lingüística. [facility] [phone].    We comply with applicable federal civil rights laws and [place] laws. We do not discriminate on the basis of race, color, national origin, age, disability, sex, sexual orientation, or gender identity.            Thank you!     Thank you for choosing [facility]  for your care. Our goal is always to provide you with excellent care. Hearing back from our patients is one way we can continue to improve our services. Please take a few minutes to complete the written survey that you may receive in the mail after your visit with us. Thank you!             Your Updated Medication List - Protect others around you: Learn how to safely use, store and throw away your medicines at www.disposemymeds.org.          This list is accurate as of 7/26/18 11:59 PM.  Always use your most recent med list.                   Brand Name Dispense Instructions for use Diagnosis    ATIVAN 0.5 MG tablet   Generic drug:  LORazepam      Take 1 tablet by mouth as needed. Sleep/anxiety        atorvastatin 10 MG tablet    LIPITOR    90 tablet    Take 1 tablet (10 mg) by mouth every other day    Personal history of malignant neoplasm of breast       CARAFATE 1 GM/10ML  suspension   Generic drug:  sucralfate      Take 1 g by mouth as needed. Up to 4 times a day        clobetasol 0.05 % ointment    TEMOVATE     Apply topically daily as needed        Fiber 0.52 g Caps      Take 1 tablet by mouth daily        fluticasone 50 MCG/ACT spray    FLONASE     Spray 1 Inhaler in nostril        hyoscyamine 0.125 MG tablet    ANASPAZ/LEVSIN          lidocaine 5 % Patch    LIDODERM     as needed        * LYSINE PO      Take  by mouth See Admin Instructions.        * Lysine 500 MG Caps      Take 500 mg by mouth        omeprazole-sodium bicarbonate  MG Caps per capsule           ondansetron 4 MG ODT tab    ZOFRAN-ODT     Take 4 mg by mouth every 6 hours as needed        ROLAIDS PO      Take 750 mg by mouth 3 times daily        SUDAFED PO      Take  by mouth as needed.        VAGIFEM 10 MCG Tabs vaginal tablet   Generic drug:  estradiol      1 tablet every other day.        VITAMIN B-12 IJ      Inject 1,000 mcg as directed Monthly        vitamin D 2000 units tablet      Take 1 tablet by mouth daily.        ZANTAC PO      Take 150 mg by mouth daily        * Notice:  This list has 2 medication(s) that are the same as other medications prescribed for you. Read the directions carefully, and ask your doctor or other care provider to review them with you.

## 2018-07-26 NOTE — LETTER
2018       RE: Abigail Lam  [address]  [place] [address]     Dear Colleague,    Thank you for referring your patient, Abigail Lam, to the [facility]. Please see a copy of my visit note below.    Gynecology Oncology Clinic  Consult Notes on Referred Patient    RE: Abigail Lam  : 1955  DANIEL: 2018    Abigail Lam is a very pleasant 63 year old woman with a recent diagnosis of thickened endometrial stripe.  Given these findings, she was subsequently desired a second opinion and follow up at the Gynecologic Cancer Clinic. She is a friend of Dr. Pritchard.    HPI: abigail presents to clinic today feeling well. She reports intermittent vaginal bleeding, pt is post-menopausal. No abnormal discharge. She was concerned with possible hernia along RLQ following surgery she had at [facility]. She is otherwise doing well. She is eating and drinking well. No bowel or urinary issues. RLQ pain when having bowel movement. She has noted that she is passing gas more frequently, she is taking simethicone and colace. No lower extremity pain or edema. No numbness or tingling.     2005: history of ER, TN+ grade 1 right DCIS and concurrent lobular carcinoma in situ of the left breast, s/p a bilateral mastectomy     On tamoxifen from  to .   Zoledronate q 6 months stopped in Dec 2013 due to msk symptoms  On Arimidex from 2011 to 2017  Started on tamoxifen in 2017: TVUS with endometrial thicnkess of 5.5 mm    1/10/2018: seen for recent US showed thickened endometrial lining    2018: US with thickened endometrial lining (9.1mm) Uterus 6.9x3.9x4.8, 4 fibroids-stable, ovaries surgically absent  - EBM benign    2018: LAVH/BS by Dr. Triana.   Intraoperative findings:  1. Small, mobile uterus.  2. Surgically absent fallopian tubes and ovaries bilaterally.  3. Small filmy adhesions between sigmoid colon and left pelvic sidewall.  4. Normal upper  abdominal survey.  5. Normal appearing appendix.  6. Gross pathologic evaluation of the endometrial cavity demonstrated no focal abnormalities, no evidence of grossly thickened endometrium.    Waiting for final pathology report from [facility] however patient told benign.      ObGyn history:    Menarche: 17 y/o  LMP:   Age at menopause: 50  History of OCPs: None  History of HRT: None  Dysplasia: Reports normal pap smears, last performed 2017 and normal per patient     I have reviewed and addressed the patient's review of symptoms for today's visit.     Past Medical History:  Esophageal reflux   Gastroesophageal Reflux   Disorder of bone and cartilage, unspecified       Malignant neoplasm of breast (female), unspecified site   Breast Cancer, Female, right   Scoliosis       Past Surgical History:  laproscopy for infertility workup.     Pelvic laparoscopy (no surgical intervention)   Bilateral MRM's 10/01/06   bilateral   BSO.  05   Salpingo-Oophorectomy   COLONOSCOPY SCREENING ,    diverticulosis   MASTECTOMY   Bilateral with reconstruction   CT CARDIAC CORONARY ARTERIES DUAL READ        Health Maintenance:  Health Maintenance Due   Topic Date Due     PHQ-2 Q1 YR  1967     TETANUS IMMUNIZATION (SYSTEM ASSIGNED)  1973     HIV SCREEN (SYSTEM ASSIGNED)  1973     HEPATITIS C SCREENING  1973     PAP SCREENING Q3 YR (SYSTEM ASSIGNED)  1976     MAMMO SCREEN Q2 YR (SYSTEM ASSIGNED)  2005     ADVANCE DIRECTIVE PLANNING Q5 YRS  2010     Last Pap Smear:               Result: normal  She has not had a history of abnormal Pap smears.    Last Mammogram: S/p bilateral mastectomy in               Result:       She has had a history of abnormal mammograms.    Last Colonoscopy: 2017              Result: normal                    Last DEXA Scan: 2017              Result: abnormal: osteopenia and taking Zometa, Vit D, and Calcium    Last Diabetes  "Screening;     Last Thyroid Screenin/20/2013    Last Cholest Screenin/18/2015    Current Medications:   has a current medication list which includes the following prescription(s): atorvastatin, ca carbonate-mag hydroxide, vitamin d, clobetasol, cyanocobalamin, estradiol, fluticasone, hyoscyamine, lidocaine, lorazepam, lysine, lysine, omeprazole-sodium bicarbonate, ondansetron, pseudoephedrine hcl, fiber, ranitidine hcl, sucralfate, and tamoxifen.     Allergies:      Allergies   Allergen Reactions     Diphtheria Toxoid-Tetanus Tox-Thimerosal      Other reaction(s): Edema     Ketorolac      Other reaction(s): Hypertension     Ketorolac Tromethamine      Other reaction(s): Other (See Comments)  Creates high blood pressure     Toradol         Social History:  Social History   Substance Use Topics     Smoking status: Never Smoker     Smokeless tobacco: Never Used     Alcohol use Not on file       History   Drug Use Not on file     Family History:   The patient's family history is notable for:  Family History   Problem Relation Age of Onset     Macular Degeneration Mother      Glaucoma No family hx of      Father: heart disease, bladder cancer (83 y/o)  Paternal grandfather: heart attach (71 y/o)  Mother: \"inflammatory breast cancer\" (75 y/o)  Maternal grandmother: Pancreatic or ovarian cancer  Daughter: Grave's disease  No hx of colon, uterine, cervical cancer    Physical Exam:   Pulse 99  Temp 96.9  F (36.1  C) (Oral)  Resp 18  Wt 41.9 kg (92 lb 6 oz)  SpO2 98%  BMI 16.9 kg/m2  Body mass index is 16.9 kg/(m^2).    General Appearance- healthy and alert, no distress  HEENT- no thyromegaly, no palpable nodules or masses   Cardiovascular- regular rate and rhythm, no gallops, rubs or murmurs   Respiratory- lungs clear, no rales, rhonchi or wheezes, normal diaphragmatic excursion  Musculoskeletal- extremities non tender and without edema  Skin- no lesions or rashes   Neurological- normal gait, no " gross defects  Psychiatric- appropriate mood and affect           Hematologica- normal cervical, supraclavicular and inguinal lymph nodes  Gastrointestinal- abdomen soft, some inflammation palpable around incision site in RLQ, size of a marble, indentation around incsion site when patient is standing up, not concerning for hernia.   Genitourinary- Pt declined pelvic exam.       Assessment:  Abigail Lam is a 63 year old woman with a new diagnosis of thickened endometrial strip on tamoxifen, now s/p LAVH on 6/22/2018. Waiting on final pathology report from [facility].      Plan:   1.)   Thickened endometrial stripe: pt s/p hysterectomy done by Dr. Triana at [facility]. No evidence of carcinoma based on patient report.   Patient needs to establish care with general ObGyn here in [place]. Reviewed signs and symptoms for when she should contact the clinic or seek additional care. Patient to contact the clinic with any questions or concerns in the interim.     2.) Genetic risk factors were assessed and the patient does not meet the qualifications for a referral.      3.) Labs and/or tests ordered include:  None.      4.) Health maintenance issues addressed today include: pt up to date with colonoscopy and DEXA scan.     5.) Incision site: instructed patient to ice area and take ibuprofen every 4-6 hours for a few days to alleviate inflammation and discomfort. Pt can massage area as tolerated.     Thank you for allowing us to participate in the care of your patient.       Sincerely,    Sravanthi Dalton MD    Department of Ob/Gyn and Women's Health  Division of Gynecologic Oncology  [facility]  [phone]    BAILEY, Renan Collazo, am serving as a scribe to document services personally performed by Sravanthi Dalton MD, based upon my observations and the provider's statements to me. All documentation has been reviewed by the aforementioned doctor prior to being entered into the  official medical record.     I have reviewed the above note and agree with the scribe's notation as written.    Again, thank you for allowing me to participate in the care of your patient.      Sincerely,    Sravanthi Dalton MD

## 2018-08-06 ENCOUNTER — RADIANT APPOINTMENT (OUTPATIENT)
Dept: MRI IMAGING | Facility: CLINIC | Age: 63
End: 2018-08-06
Attending: INTERNAL MEDICINE
Payer: COMMERCIAL

## 2018-08-06 DIAGNOSIS — Z85.3 PERSONAL HISTORY OF MALIGNANT NEOPLASM OF BREAST: ICD-10-CM

## 2018-08-06 RX ORDER — GADOBUTROL 604.72 MG/ML
7.5 INJECTION INTRAVENOUS ONCE
Status: COMPLETED | OUTPATIENT
Start: 2018-08-06 | End: 2018-08-06

## 2018-08-06 RX ADMIN — GADOBUTROL 4.5 ML: 604.72 INJECTION INTRAVENOUS at 16:02

## 2018-08-06 NOTE — DISCHARGE INSTRUCTIONS
MRI Contrast Discharge Instructions    The IV contrast you received today will pass out of your body in your  urine. This will happen in the next 24 hours. You will not feel this process.  Your urine will not change color.    Drink at least 4 extra glasses of water or juice today (unless your doctor  has restricted your fluids). This reduces the stress on your kidneys.  You may take your regular medicines.    If you are on dialysis: It is best to have dialysis today.    If you have a reaction: Most reactions happen right away. If you have  any new symptoms after leaving the hospital (such as hives or swelling),  call your hospital at the correct number below. Or call your family doctor.  If you have breathing distress or wheezing, call 911.    Special instructions: ***    I have read and understand the above information.    Signature:______________________________________ Date:___________    Staff:__________________________________________ Date:___________     Time:__________    [place] [facility] Departments:    ___[place]: [phone]  ___[facility]: [phone]  ___[place]: [phone] ___[facility]: [phone]  ___[facility]: [phone]  ___[facility]: [phone]  ___[phone]  ___[facility]: [phone]  ___[phone]

## 2018-08-09 ENCOUNTER — CARE COORDINATION (OUTPATIENT)
Dept: ONCOLOGY | Facility: CLINIC | Age: 63
End: 2018-08-09

## 2018-08-09 NOTE — PROGRESS NOTES
"Called patient's home/cell and left a VM to return call to discuss Dr Matthew's recommendations \"No specific concerns around implants.\" Liss Campos RN, BSN  Spoke to patient with Dr Matthew's recommendations after reviewing MRI breast and sent a refill for Tamoxifen to [facility] on [address] in [facility] as requested. Refill had been completed previously on July 11th, 2018, but sent it again.  Answered all patient's questions and verbalized understanding. Liss Campos RN, BSN.     "

## 2018-08-13 DIAGNOSIS — Z85.3 PERSONAL HISTORY OF MALIGNANT NEOPLASM OF BREAST: ICD-10-CM

## 2018-08-13 RX ORDER — TAMOXIFEN CITRATE 20 MG/1
20 TABLET ORAL DAILY
Qty: 90 TABLET | Refills: 3 | Status: SHIPPED | OUTPATIENT
Start: 2018-08-13 | End: 2019-06-14

## 2018-08-14 PROBLEM — G89.18 ACUTE POST-OPERATIVE PAIN: Status: ACTIVE | Noted: 2018-08-14

## 2018-11-16 ENCOUNTER — CARE COORDINATION (OUTPATIENT)
Dept: ONCOLOGY | Facility: CLINIC | Age: 63
End: 2018-11-16

## 2018-11-16 NOTE — PROGRESS NOTES
Faxed MIN for patient's record Faxed to [phone] to HIM for Genetic testing to be mailed to patient. Liss Campos RN, BSN

## 2018-12-21 ENCOUNTER — ONCOLOGY VISIT (OUTPATIENT)
Dept: ONCOLOGY | Facility: CLINIC | Age: 63
End: 2018-12-21
Attending: INTERNAL MEDICINE
Payer: COMMERCIAL

## 2018-12-21 VITALS
TEMPERATURE: 97.2 F | HEART RATE: 75 BPM | RESPIRATION RATE: 16 BRPM | WEIGHT: 93.7 LBS | SYSTOLIC BLOOD PRESSURE: 132 MMHG | DIASTOLIC BLOOD PRESSURE: 75 MMHG | BODY MASS INDEX: 17.14 KG/M2 | OXYGEN SATURATION: 99 %

## 2018-12-21 DIAGNOSIS — Z85.3 PERSONAL HISTORY OF MALIGNANT NEOPLASM OF BREAST: Primary | ICD-10-CM

## 2018-12-21 PROCEDURE — G0463 HOSPITAL OUTPT CLINIC VISIT: HCPCS | Mod: ZF

## 2018-12-21 PROCEDURE — 99214 OFFICE O/P EST MOD 30 MIN: CPT | Mod: ZP | Performed by: INTERNAL MEDICINE

## 2018-12-21 ASSESSMENT — PAIN SCALES - GENERAL: PAINLEVEL: MODERATE PAIN (5)

## 2018-12-21 NOTE — PROGRESS NOTES
DIAGNOSIS:  History of Stage I, node negative, breast cancer.  It was multifocal.  ER, [place] positive, grade 1, negative for angiolymphatic invasion. One tumor was 2 cm, another was 0.5.  Had a low Oncotype score of 7.  She was treated with bilateral mastectomy, oophorectomy, started Tamoxifen in 2006 and took it for 5 years.  She has now been on extended endocrine therapy with Arimidex since June 2011.  She had been receiving q 6 month zolendronate but this was stopped Dec 2013 as it might have been causing her rib musculskeletal symptoms.  She switched to tamoxifen in June 2017.     INTERIM HISTORY:  Ms. aLm returns after 6 months.  She had a hysterectomy at the end of June 2018 at [facility] in [place].  The pathology was said to be negative    She also had breast MRI which showed no pathology.  She had a consultation with Dr. Ruiz who told her she could have prophylactic exchange of the implant, but that was up to her.  We went over her MRI and there is little tissue between the implant and skin.  Dr. Ruiz feels it would be enough to do an exchange as an attempt to remove the capsule could be difficult.    GERD is now well controlled.  Thinks its managed well after her endoscopy    SH: Her  is enjoying his halfway.  Her daughter is expecting.  They will be spending 6 weeks in [place] before the delivery.  She is here through the week.    Otherwise no new symptoms.      ROS otherwise unremarkable.  She does  Have minor GI symptoms of gas and constipation from tamoxifen.,       PHYSICAL EXAMINATION:   /75   Pulse 75   Temp 97.2  F (36.2  C) (Oral)   Resp 16   Wt 42.5 kg (93 lb 11.1 oz)   SpO2 99%   BMI 17.14 kg/m      GENERAL:  She looks well today. No distress  HEENT: Benign  LUNGS: Clear  BACK: No tenderness to palpation.  Subjective tenderness right low back off midline, not in bone.  HEART: Normal HS, no murmur  CHEST: Implants intact and in place.  I don't see any swelling, but there  is a little difference in her lower pole of the left breast.  There are no masses.  ABDOMEN:  BS normal.  No mass or tenderness  NEURO: Grossly intact  EXTREMITIES: Normal    LABS:  None drawn.    PROBLEMS:   1.  History of ER positive breast cancer on extended therapy since about 02/2006. OncotypeDx score = 7  2.  Musculoskeletal rib pain.   3.  Low bone density/Osteoporosis on outside scan   4. H/O GERD better now.  5. Increased endometrial thickness planning hysterectomy  6. Textured implants  7. S/p total lap[aroscopic hysterectomy.     IMPRESSION:  Ms. Lam is tolerating the tamoxifen.  She is considering her options for the implants, but we discussed waiting until there was a need to do surgery, such as a capsular rupture.  I do not think she needs MRI screening, she can be managed by exam.    She wants to continue tamoxifen.  We talked about a 10 or 5 mg dose as recently presented at [facility].    PLAN:   1. Continue tamoxifen  2. RTC in 6 months.  3. Contact us sooner as necessary.    Lefty Matthew MD

## 2018-12-21 NOTE — LETTER
12/21/2018       RE: Abigail Lam  [address]  [facility] [address]     Dear Colleague,    Thank you for referring your patient, Abigail Lam, to the [facility]. Please see a copy of my visit note below.    DIAGNOSIS:  History of Stage I, node negative, breast cancer.  It was multifocal.  ER, [place] positive, grade 1, negative for angiolymphatic invasion. One tumor was 2 cm, another was 0.5.  Had a low Oncotype score of 7.  She was treated with bilateral mastectomy, oophorectomy, started Tamoxifen in 2006 and took it for 5 years.  She has now been on extended endocrine therapy with Arimidex since June 2011.  She had been receiving q 6 month zolendronate but this was stopped Dec 2013 as it might have been causing her rib musculskeletal symptoms.  She switched to tamoxifen in June 2017.     INTERIM HISTORY:  Ms. Lam returns after 6 months.  She had a hysterectomy at the end of June 2018 at [facility] in [place].  The pathology was said to be negative    She also had breast MRI which showed no pathology.  She had a consultation with Dr. Ruiz who told her she could have prophylactic exchange of the implant, but that was up to her.  We went over her MRI and there is little tissue between the implant and skin.  Dr. Ruiz feels it would be enough to do an exchange as an attempt to remove the capsule could be difficult.    GERD is now well controlled.  Thinks its managed well after her endoscopy    SH: Her  is enjoying his jail.  Her daughter is expecting.  They will be spending 6 weeks in [place] before the delivery.  She is here through the week.    Otherwise no new symptoms.      ROS otherwise unremarkable.  She does  Have minor GI symptoms of gas and constipation from tamoxifen.,       PHYSICAL EXAMINATION:   /75   Pulse 75   Temp 97.2  F (36.2  C) (Oral)   Resp 16   Wt 42.5 kg (93 lb 11.1 oz)   SpO2 99%   BMI 17.14 kg/m       GENERAL:  She looks well today. No  distress  HEENT: Benign  LUNGS: Clear  BACK: No tenderness to palpation.  Subjective tenderness right low back off midline, not in bone.  HEART: Normal HS, no murmur  CHEST: Implants intact and in place.  I don't see any swelling, but there is a little difference in her lower pole of the left breast.  There are no masses.  ABDOMEN:  BS normal.  No mass or tenderness  NEURO: Grossly intact  EXTREMITIES: Normal    LABS:  None drawn.    PROBLEMS:   1.  History of ER positive breast cancer on extended therapy since about 02/2006. OncotypeDx score = 7  2.  Musculoskeletal rib pain.   3.  Low bone density/Osteoporosis on outside scan   4. H/O GERD better now.  5. Increased endometrial thickness planning hysterectomy  6. Textured implants  7. S/p total lap[aroscopic hysterectomy.     IMPRESSION:  Ms. Lam is tolerating the tamoxifen.  She is considering her options for the implants, but we discussed waiting until there was a need to do surgery, such as a capsular rupture.  I do not think she needs MRI screening, she can be managed by exam.    She wants to continue tamoxifen.  We talked about a 10 or 5 mg dose as recently presented at [facility].    PLAN:   1. Continue tamoxifen  2. RTC in 6 months.  3. Contact us sooner as necessary.    Lefty Matthew MD

## 2018-12-21 NOTE — NURSING NOTE
"Oncology Rooming Note    December 21, 2018 10:07 AM   Abigail Lam is a 63 year old female who presents for:    Chief Complaint   Patient presents with     Oncology Clinic Visit     Breast Ca      Initial Vitals: /75   Pulse 75   Temp 97.2  F (36.2  C) (Oral)   Resp 16   Wt 42.5 kg (93 lb 11.1 oz)   SpO2 99%   BMI 17.14 kg/m   Estimated body mass index is 17.14 kg/m  as calculated from the following:    Height as of 6/15/18: 1.575 m (5' 2\").    Weight as of this encounter: 42.5 kg (93 lb 11.1 oz). Body surface area is 1.36 meters squared.  Moderate Pain (5) Comment: Data Unavailable   No LMP recorded. Patient is not currently having periods (Reason: UNKNOWN).  Allergies reviewed: Yes  Medications reviewed: Yes    Medications: MEDICATION REFILLS NEEDED TODAY. Provider was notified.  Pharmacy name entered into [employer]:    [id number] - [place], [place] - [address] [facility] - [place], [place] - [address] [facility] [id number] [place] [address] - [address] [facility] [address] AT [facility]    Clinical concerns: Poss refill  Tiff was notified.    8  minutes for nursing intake (face to face time)     Humaira Leiva MA              "

## 2019-06-14 ENCOUNTER — ONCOLOGY VISIT (OUTPATIENT)
Dept: ONCOLOGY | Facility: CLINIC | Age: 64
End: 2019-06-14
Attending: INTERNAL MEDICINE
Payer: COMMERCIAL

## 2019-06-14 VITALS
RESPIRATION RATE: 14 BRPM | SYSTOLIC BLOOD PRESSURE: 154 MMHG | BODY MASS INDEX: 17.02 KG/M2 | WEIGHT: 92.5 LBS | HEART RATE: 80 BPM | TEMPERATURE: 98 F | HEIGHT: 62 IN | OXYGEN SATURATION: 98 % | DIASTOLIC BLOOD PRESSURE: 87 MMHG

## 2019-06-14 DIAGNOSIS — Z85.3 PERSONAL HISTORY OF MALIGNANT NEOPLASM OF BREAST: ICD-10-CM

## 2019-06-14 PROCEDURE — G0463 HOSPITAL OUTPT CLINIC VISIT: HCPCS | Mod: ZF

## 2019-06-14 PROCEDURE — 99214 OFFICE O/P EST MOD 30 MIN: CPT | Mod: ZP | Performed by: INTERNAL MEDICINE

## 2019-06-14 RX ORDER — TAMOXIFEN CITRATE 20 MG/1
20 TABLET ORAL DAILY
Qty: 90 TABLET | Refills: 3 | Status: SHIPPED | OUTPATIENT
Start: 2019-06-14 | End: 2020-06-19

## 2019-06-14 RX ORDER — ATORVASTATIN CALCIUM 10 MG/1
10 TABLET, FILM COATED ORAL EVERY OTHER DAY
Qty: 90 TABLET | Refills: 3 | Status: SHIPPED | OUTPATIENT
Start: 2019-06-14 | End: 2020-06-19

## 2019-06-14 ASSESSMENT — MIFFLIN-ST. JEOR: SCORE: 922.96

## 2019-06-14 ASSESSMENT — PAIN SCALES - GENERAL: PAINLEVEL: NO PAIN (0)

## 2019-06-14 NOTE — NURSING NOTE
"Oncology Rooming Note    June 14, 2019 11:49 AM   Abigail Lam is a 64 year old female who presents for:    Chief Complaint   Patient presents with     Oncology Clinic Visit     [facility] RETURN- BREAST CA     Initial Vitals: /87 (BP Location: Left arm, Patient Position: Chair, Cuff Size: Adult Regular)   Pulse 80   Temp 98  F (36.7  C) (Oral)   Resp 14   Ht 1.575 m (5' 2.01\")   Wt 42 kg (92 lb 8 oz)   SpO2 98%   BMI 16.91 kg/m   Estimated body mass index is 16.91 kg/m  as calculated from the following:    Height as of this encounter: 1.575 m (5' 2.01\").    Weight as of this encounter: 42 kg (92 lb 8 oz). Body surface area is 1.36 meters squared.  No Pain (0) Comment: Data Unavailable   No LMP recorded. (Menstrual status: UNKNOWN).  Allergies reviewed: Yes  Medications reviewed: Yes    Medications: Medication refills not needed today.  Pharmacy name entered into [employer]:    [id number] [place] [facility] - [place], [place] - [address] [facility] - [place], [place] - [address] [facility] [id number] [place] [address] - [address] [place] [address] LN [address] AT [facility]    Clinical concerns: No new concerns. Matthew was notified.      Adonay Galvan LPN            "

## 2019-06-14 NOTE — LETTER
"6/14/2019       RE: Abigail Lam  [address] [place] [facility] [address]     Dear Colleague,    Thank you for referring your patient, Abigail Lam, to the [facility]. Please see a copy of my visit note below.    DIAGNOSIS:  History of Stage I, node negative, breast cancer.  It was multifocal.  ER, [place] positive, grade 1, negative for angiolymphatic invasion. One tumor was 2 cm, another was 0.5.  Had a low Oncotype score of 7.  She was treated with bilateral mastectomy, oophorectomy, started Tamoxifen in 2006 and took it for 5 years.  She has now been on extended endocrine therapy with Arimidex since June 2011.  She had been receiving q 6 month zolendronate but this was stopped Dec 2013 as it might have been causing her rib musculskeletal symptoms.  She switched to tamoxifen in June 2017.     INTERIM HISTORY:  Ms. Lam returns after 6 months. She had an episode of a mouth ulcer that involved the right side.  She states it started out as an aphthous ulcer, but then progressed.  She was given antibiotics.  She is left with some residual mouth numbness.     She also an episode of severe GERD, but this was an isolated incident.    SH: Her  is enjoying his custodial.  They went to [place] for 6 weeks and is no pending the birth of her first grandchild. Her daughter is in [place]. Of note is that her daughter and her niece got different messages regarding breast cancer risk from Dr. Perkins at [facility].    Otherwise no new symptoms.      ROS otherwise unremarkable.  She does  Have minor GI symptoms of gas and constipation from tamoxifen.,       PHYSICAL EXAMINATION:   /87 (BP Location: Left arm, Patient Position: Chair, Cuff Size: Adult Regular)   Pulse 80   Temp 98  F (36.7  C) (Oral)   Resp 14   Ht 1.575 m (5' 2.01\")   Wt 42 kg (92 lb 8 oz)   SpO2 98%   BMI 16.91 kg/m       GENERAL:  She looks well today. No distress  HEENT: Benign. I do not see a lesion in the mouth.  The subjective " numbness is at the corner of her mouth.  She was not examined in detail otherwise.    LABS:  None drawn.    PROBLEMS:   1.  History of ER positive breast cancer on extended therapy since about 02/2006. OncotypeDx score = 7  2.  Musculoskeletal rib pain.   3.  Low bone density/Osteoporosis on outside scan   4. H/O GERD better now.  5. Increased endometrial thickness planning hysterectomy  6. Textured implants  7. S/p total laparoscopic hysterectomy.  8. S/p mouth ulcer      IMPRESSION:  Ms. Lam is tolerating the tamoxifen. She wants to continue    PLAN:   1. Continue tamoxifen. Lipitor refill this visit  2. RTC in 6 months.  3. Contact us sooner as necessary.    Lefty Matthew MD

## 2019-06-14 NOTE — PROGRESS NOTES
"DIAGNOSIS:  History of Stage I, node negative, breast cancer.  It was multifocal.  ER, [place] positive, grade 1, negative for angiolymphatic invasion. One tumor was 2 cm, another was 0.5.  Had a low Oncotype score of 7.  She was treated with bilateral mastectomy, oophorectomy, started Tamoxifen in 2006 and took it for 5 years.  She has now been on extended endocrine therapy with Arimidex since June 2011.  She had been receiving q 6 month zolendronate but this was stopped Dec 2013 as it might have been causing her rib musculskeletal symptoms.  She switched to tamoxifen in June 2017.     INTERIM HISTORY:  Ms. Lam returns after 6 months. She had an episode of a mouth ulcer that involved the right side.  She states it started out as an aphthous ulcer, but then progressed.  She was given antibiotics.  She is left with some residual mouth numbness.     She also an episode of severe GERD, but this was an isolated incident.    SH: Her  is enjoying his [facility].  They went to [place] for 6 weeks and is no pending the birth of her first grandchild. Her daughter is in [place]. Of note is that her daughter and her niece got different messages regarding breast cancer risk from Dr. Perkins at [facility].    Otherwise no new symptoms.      ROS otherwise unremarkable.  She does  Have minor GI symptoms of gas and constipation from tamoxifen.,       PHYSICAL EXAMINATION:   /87 (BP Location: Left arm, Patient Position: Chair, Cuff Size: Adult Regular)   Pulse 80   Temp 98  F (36.7  C) (Oral)   Resp 14   Ht 1.575 m (5' 2.01\")   Wt 42 kg (92 lb 8 oz)   SpO2 98%   BMI 16.91 kg/m      GENERAL:  She looks well today. No distress  HEENT: Benign. I do not see a lesion in the mouth.  The subjective numbness is at the corner of her mouth.  She was not examined in detail otherwise.    LABS:  None drawn.    PROBLEMS:   1.  History of ER positive breast cancer on extended therapy since about 02/2006. OncotypeDx score = 7  2.  " Musculoskeletal rib pain.   3.  Low bone density/Osteoporosis on outside scan   4. H/O GERD better now.  5. Increased endometrial thickness planning hysterectomy  6. Textured implants  7. S/p total laparoscopic hysterectomy.  8. S/p mouth ulcer      IMPRESSION:  Ms. Lam is tolerating the tamoxifen. She wants to continue    PLAN:   1. Continue tamoxifen. Lipitor refill this visit  2. RTC in 6 months.  3. Contact us sooner as necessary.    Lefty Matthew MD

## 2019-12-20 ENCOUNTER — ONCOLOGY VISIT (OUTPATIENT)
Dept: ONCOLOGY | Facility: CLINIC | Age: 64
End: 2019-12-20
Attending: INTERNAL MEDICINE
Payer: COMMERCIAL

## 2019-12-20 VITALS
OXYGEN SATURATION: 99 % | HEIGHT: 62 IN | HEART RATE: 82 BPM | WEIGHT: 93.3 LBS | BODY MASS INDEX: 17.17 KG/M2 | TEMPERATURE: 98 F

## 2019-12-20 DIAGNOSIS — Z85.3 PERSONAL HISTORY OF MALIGNANT NEOPLASM OF BREAST: Primary | ICD-10-CM

## 2019-12-20 PROCEDURE — G0463 HOSPITAL OUTPT CLINIC VISIT: HCPCS | Mod: ZF

## 2019-12-20 PROCEDURE — 99214 OFFICE O/P EST MOD 30 MIN: CPT | Mod: ZP | Performed by: INTERNAL MEDICINE

## 2019-12-20 RX ORDER — FAMOTIDINE 40 MG/1
40 TABLET, FILM COATED ORAL DAILY
COMMUNITY
Start: 2019-11-09

## 2019-12-20 RX ORDER — CALCIUM CARBONATE 260MG(650)
300 TABLET,CHEWABLE ORAL DAILY
Status: ON HOLD | COMMUNITY
End: 2024-01-01

## 2019-12-20 ASSESSMENT — MIFFLIN-ST. JEOR: SCORE: 926.62

## 2019-12-20 ASSESSMENT — PAIN SCALES - GENERAL: PAINLEVEL: SEVERE PAIN (6)

## 2019-12-20 NOTE — NURSING NOTE
"Oncology Rooming Note    December 20, 2019 9:55 AM   Abigail Lam is a 64 year old female who presents for:    Chief Complaint   Patient presents with     Oncology Clinic Visit     [facility] RETURN; BREAST CANCER     Initial Vitals: Pulse 82   Temp 98  F (36.7  C) (Oral)   Ht 1.575 m (5' 2.01\")   Wt 42.3 kg (93 lb 4.8 oz)   SpO2 99%   BMI 17.06 kg/m   Estimated body mass index is 17.06 kg/m  as calculated from the following:    Height as of this encounter: 1.575 m (5' 2.01\").    Weight as of this encounter: 42.3 kg (93 lb 4.8 oz). Body surface area is 1.36 meters squared.  Severe Pain (6) Comment: Data Unavailable   No LMP recorded. (Menstrual status: UNKNOWN).  Allergies reviewed: Yes  Medications reviewed: Yes     Patient declined a blood pressure at this visit.     Medications: Medication refills not needed today.  Pharmacy name entered into [employer]:    [id number] [place] [facility] - [place], [place] - [address] [facility] - [place], [place] - [address] [id number] [place], [place] - [address] [facility] [address] AT [facility] & [facility]    Clinical concerns: No additional concerns.  Dr. Matthew was notified.      Aretha Baez, [facility]              "

## 2019-12-20 NOTE — LETTER
"12/20/2019       RE: Abigail Lam  [address] [facility] [address]     Dear Colleague,    Thank you for referring your patient, Abigail Lam, to the [facility]. Please see a copy of my visit note below.    DIAGNOSIS:  History of Stage I, node negative, breast cancer.  It was multifocal.  ER, [place] positive, grade 1, negative for angiolymphatic invasion. One tumor was 2 cm, another was 0.5.  Had a low Oncotype score of 7.  She was treated with bilateral mastectomy, oophorectomy, started Tamoxifen in 2006 and took it for 5 years.  She has now been on extended endocrine therapy with Arimidex since June 2011.  She had been receiving q 6 month zolendronate but this was stopped Dec 2013 as it might have been causing her rib musculskeletal symptoms.  She switched to tamoxifen in June 2017.     INTERIM HISTORY:  Ms. Lam returns after 6 months. She has done well. Tamoxifen causes some constipation, but has done well with mg citrate capsules. GERD is well controlled, although she had to switch her PPI.    She has spoken with Dr. Ruiz about her implants. They are textured and will follow at 6 month intervals with him. She is considering a swap from textured to non-textured implants..    SH: Her grandson was born and is 5.5 months. The family lives in [place]    Otherwise no new symptoms.  She does have some concerns about inconsistent recommendations for high risk BC from Nighat De La Vega. No genes identified in family, but sister getting MRI screening and took tamoxifen while patient's daughter not recommended for earlier screening. Suggested she talk with Dr. De La Vega next time her sister goes in to visit.    ROS otherwise unremarkable.       PHYSICAL EXAMINATION:   Ht 1.575 m (5' 2.01\")   Wt 42.3 kg (93 lb 4.8 oz)   BMI 17.06 kg/m       GENERAL:  She looks well today. No distress  HEENT: Benign.   LUNGS: Clear  BREASTS: Implants intact, no lesion  HEART: Normal HS  ABDOMEN: Benign  NEURO: " Intact  EXTREMITIES: NO edema  LABS:  None drawn.    PROBLEMS:   1.  History of ER positive breast cancer on extended therapy since about 02/2006. OncotypeDx score = 7  2.  Textured breast implants  3.  Low bone density/Osteoporosis on outside scan   4. H/O GERD better now.     IMPRESSION:  Ms. Lam is tolerating the tamoxifen better now. She will follow with Dr. Ruiz regarding implants.    PLAN:   1. Continue tamoxifen. Lipitor refill this visit  2. RTC in 6 months.  3. Contact us sooner as necessary.    Lefty Matthew MD

## 2019-12-20 NOTE — PROGRESS NOTES
"DIAGNOSIS:  History of Stage I, node negative, breast cancer.  It was multifocal.  ER, [place] positive, grade 1, negative for angiolymphatic invasion. One tumor was 2 cm, another was 0.5.  Had a low Oncotype score of 7.  She was treated with bilateral mastectomy, oophorectomy, started Tamoxifen in 2006 and took it for 5 years.  She has now been on extended endocrine therapy with Arimidex since June 2011.  She had been receiving q 6 month zolendronate but this was stopped Dec 2013 as it might have been causing her rib musculskeletal symptoms.  She switched to tamoxifen in June 2017.     INTERIM HISTORY:  Ms. Lam returns after 6 months. She has done well. Tamoxifen causes some constipation, but has done well with mg citrate capsules. GERD is well controlled, although she had to switch her PPI.    She has spoken with Dr. Ruiz about her implants. They are textured and will follow at 6 month intervals with him. She is considering a swap from textured to non-textured implants..    SH: Her grandson was born and is 5.5 months. The family lives in [place]    Otherwise no new symptoms.  She does have some concerns about inconsistent recommendations for high risk BC from Nighat De La Vega. No genes identified in family, but sister getting MRI screening and took tamoxifen while patient's daughter not recommended for earlier screening. Suggested she talk with Dr. De La Vega next time her sister goes in to visit.    ROS otherwise unremarkable.       PHYSICAL EXAMINATION:   Ht 1.575 m (5' 2.01\")   Wt 42.3 kg (93 lb 4.8 oz)   BMI 17.06 kg/m      GENERAL:  She looks well today. No distress  HEENT: Benign.   LUNGS: Clear  BREASTS: Implants intact, no lesion  HEART: Normal HS  ABDOMEN: Benign  NEURO: Intact  EXTREMITIES: NO edema  LABS:  None drawn.    PROBLEMS:   1.  History of ER positive breast cancer on extended therapy since about 02/2006. OncotypeDx score = 7  2.  Textured breast implants  3.  Low bone density/Osteoporosis on outside scan "   4. H/O GERD better now.       IMPRESSION:  Ms. Lam is tolerating the tamoxifen better now. She will follow with Dr. Ruiz regarding implants.    PLAN:   1. Continue tamoxifen. Lipitor refill this visit  2. RTC in 6 months.  3. Contact us sooner as necessary.    Lefty Matthew MD

## 2020-06-19 ENCOUNTER — VIRTUAL VISIT (OUTPATIENT)
Dept: ONCOLOGY | Facility: CLINIC | Age: 65
End: 2020-06-19
Attending: INTERNAL MEDICINE
Payer: MEDICARE

## 2020-06-19 DIAGNOSIS — Z85.3 PERSONAL HISTORY OF MALIGNANT NEOPLASM OF BREAST: ICD-10-CM

## 2020-06-19 PROCEDURE — 40001009 ZZH VIDEO/TELEPHONE VISIT; NO CHARGE

## 2020-06-19 PROCEDURE — 99214 OFFICE O/P EST MOD 30 MIN: CPT | Mod: 95 | Performed by: INTERNAL MEDICINE

## 2020-06-19 RX ORDER — ATORVASTATIN CALCIUM 10 MG/1
10 TABLET, FILM COATED ORAL EVERY OTHER DAY
Qty: 90 TABLET | Refills: 3 | Status: SHIPPED | OUTPATIENT
Start: 2020-06-19 | End: 2021-08-03

## 2020-06-19 RX ORDER — TAMOXIFEN CITRATE 20 MG/1
20 TABLET ORAL DAILY
Qty: 90 TABLET | Refills: 3 | Status: SHIPPED | OUTPATIENT
Start: 2020-06-19 | End: 2021-07-28

## 2020-06-19 NOTE — PROGRESS NOTES
"Abigail Lam is a 65 year old female who is being evaluated via a billable video visit.      The patient has been notified of following:     \"This video visit will be conducted via a call between you and your physician/provider. We have found that certain health care needs can be provided without the need for an in-person physical exam.  This service lets us provide the care you need with a video conversation.  If a prescription is necessary we can send it directly to your pharmacy.  If lab work is needed we can place an order for that and you can then stop by our lab to have the test done at a later time.    Video visits are billed at different rates depending on your insurance coverage.  Please reach out to your insurance provider with any questions.    If during the course of the call the physician/provider feels a video visit is not appropriate, you will not be charged for this service.\"    Patient has given verbal consent for Video visit? Yes    Will anyone else be joining your video visit? No          Secondary Video Option (Doximity), send text message to:  [phone]    I have reviewed and updated the patient's allergies and medication list.    Concerns: Patient has no new concerns.      Refills: Tamoxifen and Lipitor      MOLLY Jauregui      DIAGNOSIS:  History of Stage I, node negative, breast cancer.  It was multifocal.  ER, OR positive, grade 1, negative for angiolymphatic invasion. One tumor was 2 cm, another was 0.5.  Had a low Oncotype score of 7.  She was treated with bilateral mastectomy, oophorectomy, started Tamoxifen in 2006 and took it for 5 years.  She has now been on extended endocrine therapy with Arimidex since June 2011.  She had been receiving q 6 month zolendronate but this was stopped Dec 2013 as it might have been causing her rib musculskeletal symptoms.  She switched to tamoxifen in June 2017.      INTERIM HISTORY:  Ms. Lam returns after 6 months. She has seen Dr. Ruiz who " "is recommending replacement of her textured implants in the future, but with COVID, this can't be done now. Her  was on the call today.    She is doing well on tamoxifen. She has learned to manage her constipation. She is have no problems with GERD.    SOCIAL HISTORY: She and her  have been scrupulous about following COVID guidelines. Her children and grandchildren are now visiting from [place], but all have been maintaining social distancing. Her grandson is now about 1 year old.      Otherwise no new symptoms.  Her ROS isotherwise unremarkable.       PHYSICAL EXAMINATION:       GENERAL: Healthy, alert and no distress, she looks well.  EYES: Eyes grossly normal to inspection.  No discharge or erythema, or obvious scleral/conjunctival abnormalities.  RESP: No audible wheeze, cough, or visible cyanosis.  No visible retractions or increased work of breathing.  SKIN: Visible skin clear. No significant rash, abnormal pigmentation or lesions.\",\"  NEURO: Grossly intact.  Mentation and speech appropriate for age.     PROBLEMS:   1.  History of ER positive breast cancer on extended therapy since about 02/2006. OncotypeDx score = 7  2.  Textured breast implants  3.  Low bone density/Osteoporosis on outside scan   4. H/O GERD better now.        IMPRESSION:  Ms. Lam is tolerating the tamoxifen well. We discussed the need to get a DEXA and she will do that at [facility] when able. It is possible that she will be in [place] in 6 months, but we could do a video visit. She will follow with Dr. Ruiz regarding implants.     PLAN:   1. Continue tamoxifen. Lipitor and tamoxifen refill this visit  2. RTC in 6 months.  3. Contact us sooner as necessary.     Lefty Matthew MD      Video-Visit Details    Type of service:  Video Visit    Video Start Time: 9:57  Video End Time: 10:24    Originating Location (pt. Location): Home    Distant Location (provider location):  [facility]     Platform " used for Video Visit: Denise

## 2020-06-19 NOTE — LETTER
"    6/19/2020         RE: Abigail Lam  [address] [place] [facility] [address]        Dear Colleague,    Thank you for referring your patient, Abigail Lam, to the [facility]. Please see a copy of my visit note below.    Abigail Lam is a 65 year old female who is being evaluated via a billable video visit.      The patient has been notified of following:     \"This video visit will be conducted via a call between you and your physician/provider. We have found that certain health care needs can be provided without the need for an in-person physical exam.  This service lets us provide the care you need with a video conversation.  If a prescription is necessary we can send it directly to your pharmacy.  If lab work is needed we can place an order for that and you can then stop by our lab to have the test done at a later time.    Video visits are billed at different rates depending on your insurance coverage.  Please reach out to your insurance provider with any questions.    If during the course of the call the physician/provider feels a video visit is not appropriate, you will not be charged for this service.\"    Patient has given verbal consent for Video visit? Yes    Will anyone else be joining your video visit? No          Secondary Video Option (Doximity), send text message to:  [phone]    I have reviewed and updated the patient's allergies and medication list.    Concerns: Patient has no new concerns.      Refills: Tamoxifen and Lipitor      MOLLY Jauregui      DIAGNOSIS:  History of Stage I, node negative, breast cancer.  It was multifocal.  ER, MS positive, grade 1, negative for angiolymphatic invasion. One tumor was 2 cm, another was 0.5.  Had a low Oncotype score of 7.  She was treated with bilateral mastectomy, oophorectomy, started Tamoxifen in 2006 and took it for 5 years.  She has now been on extended endocrine therapy with Arimidex since June 2011.  She had been " "receiving q 6 month zolendronate but this was stopped Dec 2013 as it might have been causing her rib musculskeletal symptoms.  She switched to tamoxifen in June 2017.      INTERIM HISTORY:  Ms. Lam returns after 6 months. She has seen Dr. Ruiz who is recommending replacement of her textured implants in the future, but with COVID, this can't be done now. Her  was on the call today.    She is doing well on tamoxifen. She has learned to manage her constipation. She is have no problems with GERD.    SOCIAL HISTORY: She and her  have been scrupulous about following COVID guidelines. Her children and grandchildren are now visiting from [place], but all have been maintaining social distancing. Her grandson is now about 1 year old.      Otherwise no new symptoms.  Her ROS isotherwise unremarkable.       PHYSICAL EXAMINATION:       GENERAL: Healthy, alert and no distress, she looks well.  EYES: Eyes grossly normal to inspection.  No discharge or erythema, or obvious scleral/conjunctival abnormalities.  RESP: No audible wheeze, cough, or visible cyanosis.  No visible retractions or increased work of breathing.  SKIN: Visible skin clear. No significant rash, abnormal pigmentation or lesions.\",\"  NEURO: Grossly intact.  Mentation and speech appropriate for age.     PROBLEMS:   1.  History of ER positive breast cancer on extended therapy since about 02/2006. OncotypeDx score = 7  2.  Textured breast implants  3.  Low bone density/Osteoporosis on outside scan   4. H/O GERD better now.        IMPRESSION:  Ms. Lam is tolerating the tamoxifen well. We discussed the need to get a DEXA and she will do that at [facility] when able. It is possible that she will be in [place] in 6 months, but we could do a video visit. She will follow with Dr. Ruiz regarding implants.     PLAN:   1. Continue tamoxifen. Lipitor and tamoxifen refill this visit  2. RTC in 6 months.  3. Contact us sooner as " necessary.     Lefty Matthew MD      Video-Visit Details    Type of service:  Video Visit    Video Start Time: 9:57  Video End Time: 10:24    Originating Location (pt. Location): Home    Distant Location (provider location):  [facility]     Platform used for Video Visit: Well            Again, thank you for allowing me to participate in the care of your patient.        Sincerely,        Lefty Matthew MD

## 2020-07-27 DIAGNOSIS — Z85.3 PERSONAL HISTORY OF MALIGNANT NEOPLASM OF BREAST: ICD-10-CM

## 2020-07-27 RX ORDER — ATORVASTATIN CALCIUM 10 MG/1
TABLET, FILM COATED ORAL
Qty: 90 TABLET | Refills: 3 | OUTPATIENT
Start: 2020-07-27

## 2021-01-08 ENCOUNTER — VIRTUAL VISIT (OUTPATIENT)
Dept: ONCOLOGY | Facility: CLINIC | Age: 66
End: 2021-01-08
Attending: INTERNAL MEDICINE
Payer: MEDICARE

## 2021-01-08 DIAGNOSIS — Z85.3 PERSONAL HISTORY OF MALIGNANT NEOPLASM OF BREAST: Primary | ICD-10-CM

## 2021-01-08 PROCEDURE — 999N001193 HC VIDEO/TELEPHONE VISIT; NO CHARGE

## 2021-01-08 PROCEDURE — 99214 OFFICE O/P EST MOD 30 MIN: CPT | Mod: 95 | Performed by: INTERNAL MEDICINE

## 2021-01-08 NOTE — PROGRESS NOTES
Abigail is a 65 year old who is being evaluated via a billable video visit.      How would you like to obtain your AVS? MyChart  If the video visit is dropped, the invitation should be resent by: Text to cell phone: [phone]  Will anyone else be joining your video visit? No      I was unable to review and updated patient's allergy and medication list - patient stated she did not have time to speak to me before the visit.    Concerns:   Refills:       Sugar Elise CMA            Video-Visit Details    Type of service:  Video Visit  Video Start Time: 1228  Video End Time:1258    Originating Location (pt. Location): Home    Distant Location (provider location):  [facility]     Platform used for Video Visit: Denise      DIAGNOSIS:  History of Stage I, node negative, breast cancer.  It was multifocal.  ER, MT positive, grade 1, negative for angiolymphatic invasion. One tumor was 2 cm, another was 0.5.  Had a low Oncotype score of 7.  She was treated with bilateral mastectomy, oophorectomy, started Tamoxifen in 2006 and took it for 5 years.  She has now been on extended endocrine therapy with Arimidex since June 2011.  She had been receiving q 6 month zolendronate but this was stopped Dec 2013 as it might have been causing her rib musculskeletal symptoms.  She switched to tamoxifen in June 2017.      INTERIM HISTORY:  Ms. Lam returns after 6 months. Her appointment was slightly delayed as she accommodated a switch to allow me to schedule a patient who needed to start her therapy.    Overall, she's done well. She has not seen Dr. Ruiz yet because of COVID. She has textured implants and Dr. Ruiz would like to replace them.  She has no concerns about her implants changing in any way.    She has noticed a dark area under her great toe. She was seen by a dermatologist who felt it was likely a hematoma, but will follow. According to the patient, this lesion appeared suddenly so it  seems unlikely that it is a melanoma or a pigmented skin lesion of concern.    Otherwise she has no new complaints. Her ROS is otherwise unremarkable.    SOCIAL HISTORY: She and her  have been scrupulous about following COVID guidelines.         PHYSICAL EXAMINATION:   Video exam    GENERAL: Healthy, alert and no distress, she looks well.  EYES: Eyes grossly normal to inspection.  No discharge or erythema, or obvious scleral/conjunctival abnormalities.  RESP: No audible wheeze, cough, or visible cyanosis.  No visible retractions or increased work of breathing.  SKIN: Visible skin clear. No significant rash, abnormal pigmentation or lesions.  NEURO: Grossly intact.  Mentation and speech appropriate for age.     PROBLEMS:   1.  History of ER positive breast cancer on extended therapy since about 02/2006. OncotypeDx score = 7  2.  Textured breast implants  3.  Low bone density/Osteoporosis on outside scan   4. H/O GERD better now.  5. Toe hematoma?        IMPRESSION:  Ms. Lam is doing reasonable well despite the COVID concerns. We will continue to follow up at 6 month visits, hopefully in person next time.    PLAN:   1. Continue tamoxifen.   2. RTC in 6 months.  3. Contact us sooner as necessary.     Lefty Matthew MD

## 2021-01-08 NOTE — LETTER
1/8/2021         RE: Abigail Lam  [address]  [facility] [address]        Dear Colleague,    Thank you for referring your patient, Abigail Lam, to the [facility]. Please see a copy of my visit note below.    Abigail is a 65 year old who is being evaluated via a billable video visit.      How would you like to obtain your AVS? MyChart  If the video visit is dropped, the invitation should be resent by: Text to cell phone: [phone]  Will anyone else be joining your video visit? No      I was unable to review and updated patient's allergy and medication list - patient stated she did not have time to speak to me before the visit.    Concerns:   Refills:       Sugar Elise CMA            Video-Visit Details    Type of service:  Video Visit  Video Start Time: 1228  Video End Time:1258    Originating Location (pt. Location): Home    Distant Location (provider location):  [facility]     Platform used for Video Visit: Denise      DIAGNOSIS:  History of Stage I, node negative, breast cancer.  It was multifocal.  ER, TN positive, grade 1, negative for angiolymphatic invasion. One tumor was 2 cm, another was 0.5.  Had a low Oncotype score of 7.  She was treated with bilateral mastectomy, oophorectomy, started Tamoxifen in 2006 and took it for 5 years.  She has now been on extended endocrine therapy with Arimidex since June 2011.  She had been receiving q 6 month zolendronate but this was stopped Dec 2013 as it might have been causing her rib musculskeletal symptoms.  She switched to tamoxifen in June 2017.      INTERIM HISTORY:  Ms. Lam returns after 6 months. Her appointment was slightly delayed as she accommodated a switch to allow me to schedule a patient who needed to start her therapy.    Overall, she's done well. She has not seen Dr. Ruiz yet because of COVID. She has textured implants and Dr. Ruiz would like to replace them.  She  has no concerns about her implants changing in any way.    She has noticed a dark area under her great toe. She was seen by a dermatologist who felt it was likely a hematoma, but will follow. According to the patient, this lesion appeared suddenly so it seems unlikely that it is a melanoma or a pigmented skin lesion of concern.    Otherwise she has no new complaints. Her ROS is otherwise unremarkable.    SOCIAL HISTORY: She and her  have been scrupulous about following COVID guidelines.         PHYSICAL EXAMINATION:   Video exam    GENERAL: Healthy, alert and no distress, she looks well.  EYES: Eyes grossly normal to inspection.  No discharge or erythema, or obvious scleral/conjunctival abnormalities.  RESP: No audible wheeze, cough, or visible cyanosis.  No visible retractions or increased work of breathing.  SKIN: Visible skin clear. No significant rash, abnormal pigmentation or lesions.  NEURO: Grossly intact.  Mentation and speech appropriate for age.     PROBLEMS:   1.  History of ER positive breast cancer on extended therapy since about 02/2006. OncotypeDx score = 7  2.  Textured breast implants  3.  Low bone density/Osteoporosis on outside scan   4. H/O GERD better now.  5. Toe hematoma?        IMPRESSION:  Ms. Lam is doing reasonable well despite the COVID concerns. We will continue to follow up at 6 month visits, hopefully in person next time.    PLAN:   1. Continue tamoxifen.   2. RTC in 6 months.  3. Contact us sooner as necessary.     Lefty Matthew MD        Again, thank you for allowing me to participate in the care of your patient.        Sincerely,        Lefty Matthew MD

## 2021-01-15 ENCOUNTER — HEALTH MAINTENANCE LETTER (OUTPATIENT)
Age: 66
End: 2021-01-15

## 2021-05-12 ENCOUNTER — TRANSFERRED RECORDS (OUTPATIENT)
Dept: HEALTH INFORMATION MANAGEMENT | Facility: CLINIC | Age: 66
End: 2021-05-12

## 2021-05-27 ENCOUNTER — TRANSFERRED RECORDS (OUTPATIENT)
Dept: HEALTH INFORMATION MANAGEMENT | Facility: CLINIC | Age: 66
End: 2021-05-27

## 2021-06-25 ENCOUNTER — ONCOLOGY VISIT (OUTPATIENT)
Dept: ONCOLOGY | Facility: CLINIC | Age: 66
End: 2021-06-25
Attending: INTERNAL MEDICINE
Payer: MEDICARE

## 2021-06-25 ENCOUNTER — ANCILLARY PROCEDURE (OUTPATIENT)
Dept: GENERAL RADIOLOGY | Facility: CLINIC | Age: 66
End: 2021-06-25
Attending: INTERNAL MEDICINE
Payer: MEDICARE

## 2021-06-25 VITALS
RESPIRATION RATE: 18 BRPM | BODY MASS INDEX: 17.26 KG/M2 | HEART RATE: 84 BPM | OXYGEN SATURATION: 100 % | WEIGHT: 94.4 LBS | TEMPERATURE: 98.4 F

## 2021-06-25 DIAGNOSIS — Z85.3 PERSONAL HISTORY OF MALIGNANT NEOPLASM OF BREAST: ICD-10-CM

## 2021-06-25 DIAGNOSIS — Z85.3 PERSONAL HISTORY OF MALIGNANT NEOPLASM OF BREAST: Primary | ICD-10-CM

## 2021-06-25 PROCEDURE — G0463 HOSPITAL OUTPT CLINIC VISIT: HCPCS

## 2021-06-25 PROCEDURE — 99214 OFFICE O/P EST MOD 30 MIN: CPT | Performed by: INTERNAL MEDICINE

## 2021-06-25 PROCEDURE — 71046 X-RAY EXAM CHEST 2 VIEWS: CPT | Mod: GC | Performed by: RADIOLOGY

## 2021-06-25 ASSESSMENT — PAIN SCALES - GENERAL: PAINLEVEL: NO PAIN (0)

## 2021-06-25 NOTE — PROGRESS NOTES
DIAGNOSIS:  History of Stage I, node negative, breast cancer.  It was multifocal.  ER, MA positive, grade 1, negative for angiolymphatic invasion. One tumor was 2 cm, another was 0.5.  Had a low Oncotype score of 7.  She was treated with bilateral mastectomy, oophorectomy, started Tamoxifen in 2006 and took it for 5 years.  She has now been on extended endocrine therapy with Arimidex since June 2011.  She had been receiving q 6 month zolendronate but this was stopped Dec 2013 as it might have been causing her rib musculskeletal symptoms.  She switched to tamoxifen in June 2017.      INTERIM HISTORY:  Ms. Lam returns after 6 months. Several things have occurred.    1. She had left implant pain and swelling. She noticed this several months ago and she thinks her implant might have migrated. She does note that she's been using her upper extremities more while caring for her grandchild. She saw Dr. Ruiz who got a US, there was no fluid. He plans to change implants in early August.    2. She has intermittent SOB. She thinks this could be due to anxiety. She does not have it with exertion, but notes it sometimes at rest.    3. Lip swelling. Her lower lip became more prominent. A note from dermatology suggests that antihistamines and topical steroids have not worked (Dr. Mercado at [facility]). She saw a local dermatologist who suggests systemic steroids. The differential includes chelitis granulomatosa, sarcoid, and Crohn's disease. It has not been biopsied. She got it after her 2nd COVID Pfizer vaccine and while she was taking macrodantin for UTI.     Other than that, she has been OK. Her son is getting  soon.  Her ROS is otherwise unremarkable.         PHYSICAL EXAMINATION:   Pulse 84   Temp 98.4  F (36.9  C) (Oral)   Resp 18   Wt 42.8 kg (94 lb 6.4 oz)   SpO2 100%   BMI 17.26 kg/m      GENERAL: She looks well.  HEENT: Her lower lip and sub Q tissue is a little more prominent.   LUNGS: Clear  IMPLANTS: The  left side does look a little larger and is lower and more lateral than the right.  HEART: Normal  ABDOMEN: No HS megaly  NEURO: Intact    IMAGING: CXR normal.     PROBLEMS:   1.  History of ER positive breast cancer on extended therapy since about 02/2006. OncotypeDx score = 7  2.  Textured breast implants - plans to replace in Aug  3.  Low bone density/Osteoporosis on outside scan   4. H/O GERD better now.  5. Intermittent SOB  6. Lower lip swelling        IMPRESSION:  Ms. Lam has several concerns. We will see if we can get a dermatology opinion about her lip.    PLAN:   1. Continue tamoxifen.   2. RTC in 6 months.  3. Contact us sooner as necessary.  4. CXR today was OK  5. Dermatology referral     Lefty Matthew MD

## 2021-06-25 NOTE — NURSING NOTE
"Oncology Rooming Note    June 25, 2021 11:00 AM   Abigail Lam is a 66 year old female who presents for:    Chief Complaint   Patient presents with     Oncology Clinic Visit     Breast Cancer      Initial Vitals: There were no vitals taken for this visit. Estimated body mass index is 17.06 kg/m  as calculated from the following:    Height as of 12/20/19: 1.575 m (5' 2.01\").    Weight as of 12/20/19: 42.3 kg (93 lb 4.8 oz). There is no height or weight on file to calculate BSA.  Data Unavailable Comment: Data Unavailable   No LMP recorded. (Menstrual status: UNKNOWN).  Allergies reviewed: Yes  Medications reviewed: Yes    Medications: MEDICATION REFILLS NEEDED TODAY. Provider was NOT notified.  Pharmacy name entered into Nano:    [id number] [place] [facility] - [place], [place] - [address] [facility] - [place], [place] - [address] [id number] - [place], [place] - [address] [facility] [address] [facility]    Clinical concerns: New concerns: shortness of breath/chest compression feeling, persistent swollen lower lip.        Celena Smith LPN               "

## 2021-06-25 NOTE — LETTER
6/25/2021         RE: Abigail Lam  [address] [facility] [address]        Dear Colleague,    Thank you for referring your patient, Abigail Lam, to the [facility]. Please see a copy of my visit note below.    DIAGNOSIS:  History of Stage I, node negative, breast cancer.  It was multifocal.  ER, OR positive, grade 1, negative for angiolymphatic invasion. One tumor was 2 cm, another was 0.5.  Had a low Oncotype score of 7.  She was treated with bilateral mastectomy, oophorectomy, started Tamoxifen in 2006 and took it for 5 years.  She has now been on extended endocrine therapy with Arimidex since June 2011.  She had been receiving q 6 month zolendronate but this was stopped Dec 2013 as it might have been causing her rib musculskeletal symptoms.  She switched to tamoxifen in June 2017.      INTERIM HISTORY:  Ms. Lam returns after 6 months. Several things have occurred.    1. She had left implant pain and swelling. She noticed this several months ago and she thinks her implant might have migrated. She does note that she's been using her upper extremities more while caring for her grandchild. She saw Dr. Ruiz who got a US, there was no fluid. He plans to change implants in early August.    2. She has intermittent SOB. She thinks this could be due to anxiety. She does not have it with exertion, but notes it sometimes at rest.    3. Lip swelling. Her lower lip became more prominent. A note from dermatology suggests that antihistamines and topical steroids have not worked (Dr. Mercado at [facility]). She saw a local dermatologist who suggests systemic steroids. The differential includes chelitis granulomatosa, sarcoid, and Crohn's disease. It has not been biopsied. She got it after her 2nd COVID Pfizer vaccine and while she was taking macrodantin for UTI.     Other than that, she has been OK. Her son is getting  soon.  Her ROS is otherwise unremarkable.         PHYSICAL  EXAMINATION:   Pulse 84   Temp 98.4  F (36.9  C) (Oral)   Resp 18   Wt 42.8 kg (94 lb 6.4 oz)   SpO2 100%   BMI 17.26 kg/m      GENERAL: She looks well.  HEENT: Her lower lip and sub Q tissue is a little more prominent.   LUNGS: Clear  IMPLANTS: The left side does look a little larger and is lower and more lateral than the right.  HEART: Normal  ABDOMEN: No HS megaly  NEURO: Intact    IMAGING: CXR normal.     PROBLEMS:   1.  History of ER positive breast cancer on extended therapy since about 02/2006. OncotypeDx score = 7  2.  Textured breast implants - plans to replace in Aug  3.  Low bone density/Osteoporosis on outside scan   4. H/O GERD better now.  5. Intermittent SOB  6. Lower lip swelling        IMPRESSION:  Ms. Lam has several concerns. We will see if we can get a dermatology opinion about her lip.    PLAN:   1. Continue tamoxifen.   2. RTC in 6 months.  3. Contact us sooner as necessary.  4. CXR today was OK  5. Dermatology referral     Lefty Matthew MD      Again, thank you for allowing me to participate in the care of your patient.        Sincerely,        Lefty Matthew MD

## 2021-07-07 ENCOUNTER — OFFICE VISIT (OUTPATIENT)
Dept: ALLERGY | Facility: CLINIC | Age: 66
End: 2021-07-07
Payer: MEDICARE

## 2021-07-07 ENCOUNTER — PRE VISIT (OUTPATIENT)
Dept: ALLERGY | Facility: CLINIC | Age: 66
End: 2021-07-07

## 2021-07-07 ENCOUNTER — TELEPHONE (OUTPATIENT)
Dept: ALLERGY | Facility: CLINIC | Age: 66
End: 2021-07-07

## 2021-07-07 DIAGNOSIS — Z91.030 ALLERGY TO HONEY BEE VENOM: ICD-10-CM

## 2021-07-07 DIAGNOSIS — J45.20 MILD INTERMITTENT ASTHMA WITHOUT COMPLICATION: ICD-10-CM

## 2021-07-07 DIAGNOSIS — H10.10 SEASONAL AND PERENNIAL ALLERGIC RHINOCONJUNCTIVITIS: ICD-10-CM

## 2021-07-07 DIAGNOSIS — J30.89 SEASONAL AND PERENNIAL ALLERGIC RHINOCONJUNCTIVITIS: ICD-10-CM

## 2021-07-07 DIAGNOSIS — J30.2 SEASONAL AND PERENNIAL ALLERGIC RHINOCONJUNCTIVITIS: ICD-10-CM

## 2021-07-07 DIAGNOSIS — Z88.9 ATOPY: ICD-10-CM

## 2021-07-07 DIAGNOSIS — L20.89 OTHER ATOPIC DERMATITIS: ICD-10-CM

## 2021-07-07 DIAGNOSIS — L23.89 ALLERGIC CONTACT DERMATITIS DUE TO OTHER AGENTS: Primary | ICD-10-CM

## 2021-07-07 PROCEDURE — 99214 OFFICE O/P EST MOD 30 MIN: CPT | Performed by: DERMATOLOGY

## 2021-07-07 RX ORDER — TACROLIMUS 1 MG/G
OINTMENT TOPICAL 2 TIMES DAILY
Qty: 30 G | Refills: 0 | Status: ON HOLD | OUTPATIENT
Start: 2021-07-07 | End: 2024-01-01

## 2021-07-07 ASSESSMENT — PAIN SCALES - GENERAL: PAINLEVEL: NO PAIN (0)

## 2021-07-07 NOTE — TELEPHONE ENCOUNTER
Scheduled patient for appointment with  for allergy consultation today 07-07-21 at 13:30.     Caitlin Palomino RN

## 2021-07-07 NOTE — PROGRESS NOTES
[facility] Dermato-allergology Note  Office visit  Encounter Date: Jul 7, 2021  ____________________________________________    CC: Allergy Consult (Abigail is here today for lip swelling )      HPI:  Ms. Abigail Lam is a(n) 66 year old female who presents today as a new patient for allergy consultation  - 17th February 2021 2nd Pfizer COVID vaccine and basically photo from March 2nd already has shown some dermatitis on the lower lip with desqamation  - starting in Mid April in [place] in [facility] for recurrent diffuse swelling of the lower lip with slight erythema of the perioral area = got Elidel and Cetirizine  - patient went to the oral surgeon and he gave a fluocinonide Gel = was worsening and irrititating  - had reaction to a Fresh Shampoo  - Kiehls lip balm; Sensodyne tooth paste, Buxom lip gloss  - otherwise feeling well in usual state of health    Physical exam:  General: In no acute distress, well-developed, well-nourished  Eyes: no conjunctivitis  ENT: no signs of rhinitis   Pulmonary: no wheezing or coughing  Skin: over the lower lips slight swelling and some postinflammatory hyperpigmentation    Past Medical History:   Patient Active Problem List   Diagnosis     Personal history of malignant neoplasm of breast     Acute post-operative pain     No past medical history on file.    Allergies:  Allergies   Allergen Reactions     Diphtheria Toxoid-Tetanus Tox-Thimerosal      Other reaction(s): Edema     Ketorolac      Other reaction(s): Hypertension     Ketorolac Tromethamine      Other reaction(s): Other (See Comments)  Creates high blood pressure     Toradol        Medications:  Current Outpatient Medications   Medication     atorvastatin (LIPITOR) 10 MG tablet     Cholecalciferol (VITAMIN D) 2000 UNIT tablet     clobetasol (TEMOVATE) 0.05 % ointment     Cyanocobalamin (VITAMIN B-12 IJ)     estradiol (VAGIFEM) 10 MCG TABS     famotidine (PEPCID) 40 MG tablet     fluticasone (FLONASE) 50  MCG/ACT spray     hyoscyamine (ANASPAZ/LEVSIN) 0.125 MG tablet     lorazepam (ATIVAN) 0.5 MG tablet     Lysine 500 MG CAPS     LYSINE PO     Magnesium Citrate 100 MG TABS     Omeprazole-Sodium Bicarbonate  MG CAPS     ondansetron (ZOFRAN-ODT) 4 MG ODT tab     Pseudoephedrine HCl (SUDAFED PO)     Psyllium (FIBER) 0.52 G CAPS     tamoxifen (NOLVADEX) 20 MG tablet     Ca Carbonate-Mag Hydroxide (ROLAIDS PO)     lidocaine (LIDODERM) 5 % Patch     Ranitidine HCl (ZANTAC PO)     sucralfate (CARAFATE) 1 GM/10ML suspension     No current facility-administered medications for this visit.        Social History:    Family History:  Family History   Problem Relation Age of Onset     Macular Degeneration Mother      Glaucoma No family hx of        Previous Labs, Allergy Tests, Dermatopathology, Imaging:  See the documents given by the patient from the Dermatology in [facility] and the local allergy specialist    Referred By: No referring provider defined for this encounter.     Allergy Tests:    Past Allergy Test    Order for Future Allergy Testing:    [x] Outpatient  [] Inpatient: [facility]..../ Bed ....       Skin Atopy (atopic dermatitis) [] Yes   [x] No .........  Contact allergies:   [x] Yes   [] No .maybe lip balm  Hand eczema:   [] Yes   [] No           Leading hand:   [] R   [] L       [] Ambidextrous         Drug allergies:        [] Yes   [] No  which?......    Urticaria/Angioedema  [] Yes   [] No .........  Food Allergy:  [] Yes   [] No  which?......  Pets :  [] Yes   [] No  which?......         [x]  Rhinitis   [x] Conjunctivitis   [] Sinusitis   [] Polyposis   [] Otitis   [] Pharyngitis         []  none  Operations:   [] Tonsils   [] Septum   [] Sinus   [] Polyposis        [x] Asthma bronchiale   [] Coughing      []  none  To cats  Symptoms (mostly Rhinoconjunctivitis and Asthma) aggravated by:  Season   [] I   [] II   [] III   [] IV   [x]V   [x]VI   []VII   []VIII   []IX   []X   []XI   []XI     [] perennial   Day time       [] morning   [] noon      [] evening        [] night    [] whole day........  [x]  none  Location/changes    [] inside        [] outside   [] mountains    [] sea     [] others.............   [x]  none  Triggers, specific     [x] animals     [] plants     [] dust              [] others ...........................    []  none  Triggers, others       [] work          [] psyche    [] sport            [] others .............................  [x]  none  Irritant                [] phys efforts [] smoke    [] heat/cold     [] odors  []others............... [x]  none    Order for PATCH TESTS  Reason for tests (suspected allergy): recurrent dermatitis  Known previous allergies: none  Standardized panels  [x] Standard panel (40 tests)  [x] Preservatives & Antimicrobials (31 tests)  [x] Emulsifiers & Additives (25 tests)   [x] Perfumes/Flavours & Plants (25 tests)  [x] Hairdresser panel (12 tests)  [] Rubber Chemicals (22 tests)  [x] Plastics (26 tests)  [] Colorants/Dyes/Food additives (20 tests)  [x] Metals (implants/dental) (24 tests)  [] Local anaesthetics/NSAIDs (13 tests)  [] Antibiotics & Antimycotics (14 tests)   [] Corticosteroids (15 tests)   [] Photopatch test (62 tests)   [] others: ...      [x] Patient's own products: Fluocinonide gel, Kiehls lip balm, gloss, Sensodyne    tooth paste    DO NOT test if chemical or biological identity is unknown!     always ask from patient the product information and safety sheets (MSDS)       Order for PRICK TESTS    Reason for tests (suspected allergy): atopy  Known previous allergies: grass pollens    Standardized prick panels  [x] Atopic panel (20 tests)  [] Pediatric Panel (12 tests)  [] Milk, Meat, Eggs, Grains (20 tests)   [] Dust, Epithelia, Feathers (10 tests)  [] Fish, Seafood, Shellfish (17 tests)  [] Nuts, Beans (14 tests)  [] Spice, Vegetable, Fruit (17 tests)  [] Pollen Panel = Tree, Grass, Weed (24 tests)  [] Others: ...      [] Patient's own products: ...    DO  NOT test if chemical or biological identity is unknown!     always ask from patient the product information and safety sheets (MSDS)     Standardized intradermal tests  [x] Penicillium notatum [x] Aspergillus fumigatus [x] House dust mites D.far & D. pteron  [] Cat    [] dog  [] Others: ...  [] Bee venom   [] Wasp venom  !!Specific protocol with dilutions!!       [] Patient needs consultation with Allergy team (changes of tests may apply)  [x] Tests discussed with Allergy team (can have direct appointment for allergy tests)     ________________________________    Assessment & Plan:    ==> Final Diagnosis:     # chronic swelling and eczematous reaction on lower lip DDx allergic contact dermatitis to topicals or atopic dermatitis  * chronic with acute exacerbation      # Atopic predisposition with:    Seasonal RC in summer to grass pollens    RCA and contact Urticaria to cats/horses    Possible atopic dermatitis  * chronic with acute exacerbation      # 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    ==> Treatment Plan:  >> try Protopic oint 0.1% on lips  >> try pure vaseline and stop everything else on lips      Staff:  Provider    Follow-up in Derm-Allergy clinic for prick and patch tests (stop antihistamines 5 days prior)    I spent a total of 40- minutes with Abigail Lam. This time was spent counseling the patient and/or coordinating care, explaining the allergy tests

## 2021-07-07 NOTE — LETTER
7/7/2021         RE: Abigail Lam  [address]  [facility] [id number]        Dear Colleague,    Thank you for referring your patient, Abigail Lam, to the [facility] [place]. Please see a copy of my visit note below.    [facility] Dermato-allergology Note  Office visit  Encounter Date: Jul 7, 2021  ____________________________________________    CC: Allergy Consult (Abigail is here today for lip swelling )      HPI:  Ms. Abigail Lam is a(n) 66 year old female who presents today as a new patient for allergy consultation  - 17th February 2021 2nd Pfizer COVID vaccine and basically photo from March 2nd already has shown some dermatitis on the lower lip with desqamation  - starting in Mid April in [place] in [facility] for recurrent diffuse swelling of the lower lip with slight erythema of the perioral area = got Elidel and Cetirizine  - patient went to the oral surgeon and he gave a fluocinonide Gel = was worsening and irrititating  - had reaction to a Fresh Shampoo  - Kiehls lip balm; Sensodyne tooth paste, Buxom lip gloss  - otherwise feeling well in usual state of health    Physical exam:  General: In no acute distress, well-developed, well-nourished  Eyes: no conjunctivitis  ENT: no signs of rhinitis   Pulmonary: no wheezing or coughing  Skin: over the lower lips slight swelling and some postinflammatory hyperpigmentation    Past Medical History:   Patient Active Problem List   Diagnosis     Personal history of malignant neoplasm of breast     Acute post-operative pain     No past medical history on file.    Allergies:  Allergies   Allergen Reactions     Diphtheria Toxoid-Tetanus Tox-Thimerosal      Other reaction(s): Edema     Ketorolac      Other reaction(s): Hypertension     Ketorolac Tromethamine      Other reaction(s): Other (See Comments)  Creates high blood pressure     Toradol        Medications:  Current Outpatient Medications   Medication      atorvastatin (LIPITOR) 10 MG tablet     Cholecalciferol (VITAMIN D) 2000 UNIT tablet     clobetasol (TEMOVATE) 0.05 % ointment     Cyanocobalamin (VITAMIN B-12 IJ)     estradiol (VAGIFEM) 10 MCG TABS     famotidine (PEPCID) 40 MG tablet     fluticasone (FLONASE) 50 MCG/ACT spray     hyoscyamine (ANASPAZ/LEVSIN) 0.125 MG tablet     lorazepam (ATIVAN) 0.5 MG tablet     Lysine 500 MG CAPS     LYSINE PO     Magnesium Citrate 100 MG TABS     Omeprazole-Sodium Bicarbonate  MG CAPS     ondansetron (ZOFRAN-ODT) 4 MG ODT tab     Pseudoephedrine HCl (SUDAFED PO)     Psyllium (FIBER) 0.52 G CAPS     tamoxifen (NOLVADEX) 20 MG tablet     Ca Carbonate-Mag Hydroxide (ROLAIDS PO)     lidocaine (LIDODERM) 5 % Patch     Ranitidine HCl (ZANTAC PO)     sucralfate (CARAFATE) 1 GM/10ML suspension     No current facility-administered medications for this visit.        Social History:    Family History:  Family History   Problem Relation Age of Onset     Macular Degeneration Mother      Glaucoma No family hx of        Previous Labs, Allergy Tests, Dermatopathology, Imaging:  See the documents given by the patient from the Dermatology in [facility] and the local allergy specialist    Referred By: No referring provider defined for this encounter.     Allergy Tests:    Past Allergy Test    Order for Future Allergy Testing:    [x] Outpatient  [] Inpatient: [facility]..../ Bed ....       Skin Atopy (atopic dermatitis) [] Yes   [x] No .........  Contact allergies:   [x] Yes   [] No .maybe lip balm  Hand eczema:   [] Yes   [] No           Leading hand:   [] R   [] L       [] Ambidextrous         Drug allergies:        [] Yes   [] No  which?......    Urticaria/Angioedema  [] Yes   [] No .........  Food Allergy:  [] Yes   [] No  which?......  Pets :  [] Yes   [] No  which?......         [x]  Rhinitis   [x] Conjunctivitis   [] Sinusitis   [] Polyposis   [] Otitis   [] Pharyngitis         []  none  Operations:   [] Tonsils   [] Septum   [] Sinus   []  Polyposis        [x] Asthma bronchiale   [] Coughing      []  none  To cats  Symptoms (mostly Rhinoconjunctivitis and Asthma) aggravated by:  Season   [] I   [] II   [] III   [] IV   [x]V   [x]VI   []VII   []VIII   []IX   []X   []XI   []XI     [] perennial   Day time      [] morning   [] noon      [] evening        [] night    [] whole day........  [x]  none  Location/changes    [] inside        [] outside   [] mountains    [] sea     [] others.............   [x]  none  Triggers, specific     [x] animals     [] plants     [] dust              [] others ...........................    []  none  Triggers, others       [] work          [] psyche    [] sport            [] others .............................  [x]  none  Irritant                [] phys efforts [] smoke    [] heat/cold     [] odors  []others............... [x]  none    Order for PATCH TESTS  Reason for tests (suspected allergy): recurrent dermatitis  Known previous allergies: none  Standardized panels  [x] Standard panel (40 tests)  [x] Preservatives & Antimicrobials (31 tests)  [x] Emulsifiers & Additives (25 tests)   [x] Perfumes/Flavours & Plants (25 tests)  [x] Hairdresser panel (12 tests)  [] Rubber Chemicals (22 tests)  [x] Plastics (26 tests)  [] Colorants/Dyes/Food additives (20 tests)  [x] Metals (implants/dental) (24 tests)  [] Local anaesthetics/NSAIDs (13 tests)  [] Antibiotics & Antimycotics (14 tests)   [] Corticosteroids (15 tests)   [] Photopatch test (62 tests)   [] others: ...      [x] Patient's own products: Fluocinonide gel, Kiehls lip balm, gloss, Sensodyne    tooth paste    DO NOT test if chemical or biological identity is unknown!     always ask from patient the product information and safety sheets (MSDS)       Order for PRICK TESTS    Reason for tests (suspected allergy): atopy  Known previous allergies: grass pollens    Standardized prick panels  [x] Atopic panel (20 tests)  [] Pediatric Panel (12 tests)  [] Milk, Meat, Eggs,  Grains (20 tests)   [] Dust, Epithelia, Feathers (10 tests)  [] Fish, Seafood, Shellfish (17 tests)  [] Nuts, Beans (14 tests)  [] Spice, Vegetable, Fruit (17 tests)  [] Pollen Panel = Tree, Grass, Weed (24 tests)  [] Others: ...      [] Patient's own products: ...    DO NOT test if chemical or biological identity is unknown!     always ask from patient the product information and safety sheets (MSDS)     Standardized intradermal tests  [x] Penicillium notatum [x] Aspergillus fumigatus [x] House dust mites D.far & D. pteron  [] Cat    [] dog  [] Others: ...  [] Bee venom   [] Wasp venom  !!Specific protocol with dilutions!!       [] Patient needs consultation with Allergy team (changes of tests may apply)  [x] Tests discussed with Allergy team (can have direct appointment for allergy tests)     ________________________________    Assessment & Plan:    ==> Final Diagnosis:     # chronic swelling and eczematous reaction on lower lip DDx allergic contact dermatitis to topicals or atopic dermatitis  * chronic with acute exacerbation      # Atopic predisposition with:    Seasonal RC in summer to grass pollens    RCA and contact Urticaria to cats/horses    Possible atopic dermatitis  * chronic with acute exacerbation      # 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    ==> Treatment Plan:  >> try Protopic oint 0.1% on lips  >> try pure vaseline and stop everything else on lips      Staff:  Provider    Follow-up in Derm-Allergy clinic for prick and patch tests (stop antihistamines 5 days prior)    I spent a total of 40- minutes with Aibgail Lam. This time was spent counseling the patient and/or coordinating care, explaining the allergy tests      Again, thank you  for allowing me to participate in the care of your patient.        Sincerely,        Trevon Michel MD

## 2021-07-07 NOTE — TELEPHONE ENCOUNTER
Order for PATCH TESTS  Reason for tests (suspected allergy): recurrent dermatitis  Known previous allergies: none  Standardized panels  [x]? Standard panel (40 tests)  [x]? Preservatives & Antimicrobials (31 tests)  [x]? Emulsifiers & Additives (25 tests)   [x]? Perfumes/Flavours & Plants (25 tests)  [x]? Hairdresser panel (12 tests)  []? Rubber Chemicals (22 tests)  [x]? Plastics (26 tests)  []? Colorants/Dyes/Food additives (20 tests)  [x]? Metals (implants/dental) (24 tests)  []? Local anaesthetics/NSAIDs (13 tests)  []? Antibiotics & Antimycotics (14 tests)   []? Corticosteroids (15 tests)   []? Photopatch test (62 tests)   []? others: ...                         [x]? Patient's own products: Fluocinonide gel, Kiehls lip balm, gloss, Sensodyne                                    tooth paste    DO NOT test if chemical or biological identity is unknown!     always ask from patient the product information and safety sheets (MSDS)         Order for PRICK TESTS     Reason for tests (suspected allergy): atopy  Known previous allergies: grass pollens     Standardized prick panels  [x]? Atopic panel (20 tests)  []? Pediatric Panel (12 tests)  []? Milk, Meat, Eggs, Grains (20 tests)   []? Dust, Epithelia, Feathers (10 tests)  []? Fish, Seafood, Shellfish (17 tests)  []? Nuts, Beans (14 tests)  []? Spice, Vegetable, Fruit (17 tests)  []? Pollen Panel = Tree, Grass, Weed (24 tests)  []? Others: ...                         []? Patient's own products: ...    DO NOT test if chemical or biological identity is unknown!     always ask from patient the product information and safety sheets (MSDS)      Standardized intradermal tests  [x]? Penicillium notatum           [x]? Aspergillus fumigatus        [x]? House dust mites D.far & D. pteron  []? Cat                                      []? dog             []? Others: ...  []? Bee venom   []? Wasp venom  !!Specific protocol with dilutions!!         []? Patient needs consultation with  Allergy team (changes of tests may apply)  [x]? Tests discussed with Allergy team (can have direct appointment for allergy tests)     STANDARD Series        # Substance 2 days 4 days remarks    1 Herminio Mix [C] - -     2 Colophony - -     3  2-Mercaptobenzothiazole  - -      4 Methylisothiazolinone - -     5 Carba Mix - -     6 Thiuram Mix [A] - -     7 Bisphenol A Epoxy Resin - -     8 V-Llfp-Jjbcclmsklu-Formaldehyde Resin - -     9 Mercapto Mix [A] - -     10 Black Rubber Mix- PPD [B] - -     11 Potassium Dichromate  -  -     12 Balsam of [place] (Myroxylon Pereirae Resin) - -     13 Nickel Sulphate Hexahydrate - -     14 Mixed Dialkyl Thiourea - -     15 Paraben Mix [B] - -     16 Methyldibromo Glutaronitrile - -     17 Fragrance Mix - -     18 2-Bromo-2-Nitropropane-1,3-Diol (Bronopol) CT - -     19 Lyral - -     20 Tixocortol-21- Pivalate CT - -     21 Diazolidiyl Urea (Germall II) - -     22 Methyl Methacrylate - -     23 Cobalt (II) Chloride Hexahydrate - -     24 Fragrance Mix II  - -     25 Compositae Mix - -     26 Benzoyl Peroxide - -     27 Bacitracin - -     28 Formaldehyde - -     29 Methylchloroisothiazolinone / Methylisothiazolinone - -     30 Corticosteroid Mix CT - -     31 Sodium Lauryl Sulfate - -     32 Lanolin Alcohol - -     33 Turpentine - -     34 Cetylstearylalcohol - -     35 Chlorhexidine Dicluconate - -     36 Budenoside - -     37 Imidazolidinyl Urea  - -     38 Ethyl-2 Cyanoacrylate - -     39 Quaternium 15 (Dowicil 200) - -     40 Decyl Glucoside - -    PRESERVATIVES & ANTIMICROBIALS        # Substance 2 days 4 days remarks   41 1  1,2-Benzisothiazoline-3-One, Sodium Salt - -     2  1,3,5-Cnoor (2-Hydroxyethyl) - Hexahydrotriazine (Grotan BK) - -     3 6-Jbgjpvqiwodcn-0-Nitro-1, 3-Propanediol - -     4  3, 4, 4' - Triclocarban - -    45 5 4 - Chloro - 3 - Cresol - -     6 4 - Chloro - 4 - Xylenol (PCMX) - -     7 7-Ethylbicyclooxazolidine ([facility]88) - -     8 Benzalkonium Chloride CT  - -     9 Benzyl Alcohol - -    50 10 Cetalkonium Chloride - -     11 Cetylpyrimidine Chloride  - -     12 Chloroacetamide - -     13 DMDM Hydantoin - -     14 Glutaraldehyde - -    55 15 Triclosan - -     16 Glyoxal Trimeric Dihydrate - -     17 Iodopropynyl Butylcarbamate - -     18 Octylisothiazoline - -     19 Bithionol CT - -    60 20 Bioban P 1487 (Nitrobutyl) Morpholine/(Ethylnitro-Trimethylene) Dimorpholine - -     21 Phenoxyethanol - -     22 Phenyl Salicylate - -     23 Povidone Iodine - -     24 Sodium Benzoate - -    65 25 Sodium Disulfite - -     26 Sorbic Acid - -     27 Thimerosal       28 Melamine Formaldehyde Resin       29 Ethylenediamine Dihydrochloride        Parabens      70 30 Butyl-P-Hydroxybenzoate - -     31 Ethyl-P-Hydroxybenzoate - -     32 Methyl-P-Hydroxybenzoate - -    73 33 Propyl-P-Hydroxybenzoate - -    EMULSIFIERS & ADDITIVES       # Substance 2 days 4 days remarks   74 1 Polyethylene Glycol-400 - -    75 2 Cocamidopropyl Betaine - -     3 Amerchol L101 - -     4 Propylene Glycol - -     5 Triethanolamine - -     6 Sorbitane Sesquiolate CT - -    80 7 Isopropylmyristate - -     8 Polysorbate 80 CT - -     9 Amidoamine   (Stearamidopropyl Dimethylamine) - -     10 Oleamidopropyl Dimethylamine - -     11 Lauryl Glucoside - -    85 12 Coconut Diethanolamide  - -     13 2-Hydroxy-4-Methoxy Benzophenone (Oxybenzone) - -     14 Benzophenone-4 (Sulisobenzon) - -     15 Propolis - -     16 Dexpanthenol - -    90 17 Carboxymethyl Cellulose Sodium - -     18 Abitol - -     19 Tert-Butylhydroquinone - -     20 Benzyl Salicylate - -     21 Dimethylaminopropylamin (DMPA) CT? D053      95 22 Zinc Pyrithione (Zinc Omadine) CT? Z006       23 Conor(Hydroxymethyl) Nitromethane CT        Antioxidant - -     24 Dodecyl Gallate - -     25 Butylhydroxyanisole (BHA) - -     26 Butylhydroxytoluene (BHT) - -    100 27 Di-Alpha-Tocopherol (Vit E) - -    101 28 Propyl Gallate - -    PERFUMES, FLAVORS &  PLANTS        # Substance 2 days 4 days remarks   102 1 Benzyl Cinnamate - -     2 Di-Limonene (Dipentene) - -     3 Cananga Odorata (Hipolito Mcmillan) (I) - -    105 4 Lichen Acid Mix - -     5 Mentha Piperita Oil (Peppermint Oil) - -     6 Sesquiterpenelactone mix - -     7 Tea Tree Oil, Oxidized - -     8 Wood Tar Mix - -    110 9 Abietic Acid - -     10 Lavendula Angustifolia Oil (Lavender Oil) - -     11 Fragrance mix II CT * - -      Fragrance Mix I       12 Oakmoss Absolute - -     13 Eugenol - -    115 14 Geraniol - -     15 Hydroxycitronellal - -     16 Isoeugenol - -     17 Cinnamic Aldehyde - -     18 Cinnamic Alcohol  - -      Fragrance mix II      120 19 Citronellol - -     20 Alpha-Hexylcinnamic Aldehyde    - -     21 Citral - -     22 Farnesol - -    124 23 Coumarin - -      Hexylcinnamic aldehyde, Coumarin, Farnesol, Hydroxyisohexy3-cyclohexene carboxaldehyde, citral, citrolellol  HAIRDRESSER        # Substance 2 days 4 days remarks   125 1 P-Phenylenediamin  -  -     2 P-Toluenediamine Sulphate  -  -     3 Ammonium Thioglycolate - -     4 Ammonium Persulfate - -     5 Resorcinol - -    130 6 3-Aminophenol - -     7 P-Aminophenol - -     8 Glyceryl Monothioglycolate - -     9 Pyrogallol - -     10 P-Aminodiphenylamine - -    PLASTICS        # Substance 2 days 4 days remarks     Acrylates - -    135 1 2-Hydroxyethyl Methacrylate (HEMA) - -     2 1,4-Butandioldimethacrylate (BUDMA) - -     3  2-Ethylhexyl Acrylate - -     4 Bisphenol-A-Dimethacrylate  - -     5 Diurethane-Dimethacrylate - -    140 6 Ethyleneglycoldimethacrylate (EGDMA) - -     7 Pentaerythritoltriacrylate (NERISSA) - -     8 Triethylene Glycol Dimethacrylate (TEGDMA) - -      Synthetic material/additives        9 L-Eord-Akbsxfnlmma - -     10 Tricresyl Phosphate - -    145 11 8-Kvtu-Djetpvewqpodq - -     12 Bis (2-Ethylhexyl) Phthalate - -     13 Dibutylphthalate - -     14 Dimethylphthalate - -     15 Toluene-2,4-Diisocyanate - -    150 16  Diphenylmethane-4,4''-Diisocyanate - -      EPOXY RESIN SYSTEMS        Reactive Solvents - -     17 Cresyl Glycidyl Ether - -     18 Butyl Glycidyl Ether - -     19 Phenyl Glycidyl Ether - -     20 1,4-Butanediol Diglycidyl Ether - -    155 21 1,6-Hexanediole Diglycidyl Ether - -      Hardener / Accelerator - -     22 Triethylenetetramine - -     23 Diethylenetriamine - -     24 Isophorone Diamine (IPD) - -     25 N,N-Dimethyl-P-Toluidine - -    160 26 3-tnoj-Pchyjytciwzuv (antioxidant/stabilizer) CT - -     27 6-qwvt-Yxuhhmpnwwztwnsoysorvul resin PTBP CT  - -      METALS (Implants / Dental)        # Substance 2 days 4 days remarks    1 Ammonium Heptamolybdate (IV) - -     2 Ammonium Tetrachloroplatinate - -     3 Indium (III) Chloride - -    165 4 Iridium (III) Chloride - -     5 Ferric Chloride - -     6 Manganese (II) Chloride - -     7 Niobium (V) Chloride - -     8 Palladium Chloride - -    170 9 Silver Nitrate - -     10 Gold Sodium Thiosulfate - -     11 Tantal - -     12 Tin (II) Chloride - -     13 Titanium (IV) Oxide - -    175 14 Titanium - -     15 Tungstic Acid, Sod Salt Dihydrat - -     16 Vanadium Pentoxide - -     17 Wolfram - -     18 Zinc Chloride - -    180 19 Zirconium (IV) Oxide - -     20 Ammoniated Murcury - -     21 Copper Sulfate Pentahydrate - -     22 Amalgam  - -     23 Aluminum Hydroxide - -    185 24 Platinum Tetrachloride - -        OTHER PRODUCTS        No Substance Conc  % solv 2 days 4 days remarks   186 1 Fluocinonide gel        187 2 Kiehls Lip Kendall        188 3 Kiehls lip gloss        189 4 Sensodyne toothpaste         5          6          7          8          9          10           Patients own products:  è DO NOT test if chemical or biological identity is unknown!   - always ask from patient the product information and safety sheets and consult with the physician   - check neutral pH with pH indicator paper (do not test pH below 5 or over 8 or consult with physician)  -  "leave-on cosmetics can be tested \"as is\"  - rinse-off products have to be diluted with water, buffer, olive oil or paraffin (discuss with physician)  à remember:   - non-specific toxic/corrosive or biological reactions can occur  - tests with patients own products are not standardized and test conditions are not optimized for patch tests. Whenever possible test with standardized test series and correlate use of product with the result of the patch tests  - if not sure if compounds can be tested under occlusion in patch tests consider open application tests      "

## 2021-07-07 NOTE — TELEPHONE ENCOUNTER
Left message for patient to schedule appointment ASAP with . Direct call back information was given to the patient.     Caitlin Palomino RN

## 2021-07-07 NOTE — TELEPHONE ENCOUNTER
FUTURE VISIT INFORMATION      FUTURE VISIT INFORMATION:    Date: 7.7.21    Time: 1:30    [place] [facility] [place]  REFERRAL INFORMATION:    Referring provider:  Dr. Lefty Matthew    Referring providers clinic:  [facility]    Reason for visit/diagnosis      RECORDS REQUESTED FROM:       Clinic name Comments Records Status Imaging Status   [facility] 6.25.21  Dr. Matthew [facility] na   [facility] 4.22.21, 4.14.21  Dr. Shi Mercado  na

## 2021-07-07 NOTE — NURSING NOTE
Dermatology Rooming Note    Abigail Lam's goals for this visit include:   Chief Complaint   Patient presents with     Allergy Consult     Abigail is here today for lip swelling      Rudi Selby, CMA

## 2021-07-08 ENCOUNTER — MYC MEDICAL ADVICE (OUTPATIENT)
Dept: ALLERGY | Facility: CLINIC | Age: 66
End: 2021-07-08

## 2021-07-10 NOTE — PROGRESS NOTES
[facility] Dermato-allergology Note  Office visit  Encounter Date: Jul 12, 2021  ____________________________________________    CC: No chief complaint on file.      HPI:  Ms. Abigail Lam is a(n) 66 year old female who presents today as a return patient for allergy consultation  - Follow-up in Derm-Allergy clinic for prick and patch tests (stop antihistamines 5 days prior)  Discussed long time with patient the need for the patch tests and that we have to find out if there is a contact allergy. Patient somehow worried about possible long term reactions to the patch tests and explained to her that this is unlikely the case. Did not want to have patch tests on the arms (has wedding of son next week), could not add the last set of patch tests with patients own and 5 last ones of the metal series.   - otherwise feeling well in usual state of health    Physical exam:  General: In no acute distress, well-developed, well-nourished  Eyes: no conjunctivitis  ENT: no signs of rhinitis   Pulmonary: no wheezing or coughing  Skin: Focused examination of the skin on test sites was performed = see test results below  No active eczematous skin lesions on tests sites, particularly back    Earlier History and Allergy exams:  - 17th February 2021 2nd Pfizer COVID vaccine and basically photo from March 2nd already has shown some dermatitis on the lower lip with desqamation  - starting in Mid April in [place] in [facility] for recurrent diffuse swelling of the lower lip with slight erythema of the perioral area = got Elidel and Cetirizine  - patient went to the oral surgeon and he gave a fluocinonide Gel = was worsening and irrititating  - had reaction to a Fresh Shampoo  - Kiehls lip balm; Sensodyne tooth paste, Buxom lip gloss  - otherwise feeling well in usual state of health    Past Medical History:   Patient Active Problem List   Diagnosis     Personal history of malignant neoplasm of breast     Acute  post-operative pain     No past medical history on file.    Allergies:  Allergies   Allergen Reactions     Diphtheria Toxoid-Tetanus Tox-Thimerosal      Other reaction(s): Edema     Ketorolac      Other reaction(s): Hypertension     Ketorolac Tromethamine      Other reaction(s): Other (See Comments)  Creates high blood pressure     Toradol        Medications:  Current Outpatient Medications   Medication     atorvastatin (LIPITOR) 10 MG tablet     Ca Carbonate-Mag Hydroxide (ROLAIDS PO)     Cholecalciferol (VITAMIN D) 2000 UNIT tablet     clobetasol (TEMOVATE) 0.05 % ointment     Cyanocobalamin (VITAMIN B-12 IJ)     estradiol (VAGIFEM) 10 MCG TABS     famotidine (PEPCID) 40 MG tablet     fluticasone (FLONASE) 50 MCG/ACT spray     hyoscyamine (ANASPAZ/LEVSIN) 0.125 MG tablet     lidocaine (LIDODERM) 5 % Patch     lorazepam (ATIVAN) 0.5 MG tablet     Lysine 500 MG CAPS     LYSINE PO     Magnesium Citrate 100 MG TABS     Omeprazole-Sodium Bicarbonate  MG CAPS     ondansetron (ZOFRAN-ODT) 4 MG ODT tab     Pseudoephedrine HCl (SUDAFED PO)     Psyllium (FIBER) 0.52 G CAPS     Ranitidine HCl (ZANTAC PO)     sucralfate (CARAFATE) 1 GM/10ML suspension     tacrolimus (PROTOPIC) 0.1 % external ointment     tamoxifen (NOLVADEX) 20 MG tablet     No current facility-administered medications for this visit.        Social History:    Family History:  Family History   Problem Relation Age of Onset     Macular Degeneration Mother      Glaucoma No family hx of        Previous Labs, Allergy Tests, Dermatopathology, Imaging:  See the documents given by the patient from the Dermatology in [facility] and the local allergy specialist    Referred By: No referring provider defined for this encounter.     Allergy Tests:    Past Allergy Test    Order for Future Allergy Testing:    [x] Outpatient  [] Inpatient: Akbar..../ Bed ....       Skin Atopy (atopic dermatitis) [] Yes   [x] No .........  Contact allergies:   [x] Yes   [] No .maybe lip  balm  Hand eczema:   [] Yes   [] No           Leading hand:   [] R   [] L       [] Ambidextrous         Drug allergies:        [] Yes   [] No  which?......    Urticaria/Angioedema  [] Yes   [] No .........  Food Allergy:  [] Yes   [] No  which?......  Pets :  [] Yes   [] No  which?......         [x]  Rhinitis   [x] Conjunctivitis   [] Sinusitis   [] Polyposis   [] Otitis   [] Pharyngitis         []  none  Operations:   [] Tonsils   [] Septum   [] Sinus   [] Polyposis        [x] Asthma bronchiale   [] Coughing      []  none  To cats  Symptoms (mostly Rhinoconjunctivitis and Asthma) aggravated by:  Season   [] I   [] II   [] III   [] IV   [x]V   [x]VI   []VII   []VIII   []IX   []X   []XI   []XI     [] perennial   Day time      [] morning   [] noon      [] evening        [] night    [] whole day........  [x]  none  Location/changes    [] inside        [] outside   [] mountains    [] sea     [] others.............   [x]  none  Triggers, specific     [x] animals     [] plants     [] dust              [] others ...........................    []  none  Triggers, others       [] work          [] psyche    [] sport            [] others .............................  [x]  none  Irritant                [] phys efforts [] smoke    [] heat/cold     [] odors  []others............... [x]  none    Order for PATCH TESTS  Reason for tests (suspected allergy): recurrent dermatitis  Known previous allergies: none  Standardized panels  [x] Standard panel (40 tests)  [x] Preservatives & Antimicrobials (31 tests)  [x] Emulsifiers & Additives (25 tests)   [x] Perfumes/Flavours & Plants (25 tests)  [x] Hairdresser panel (12 tests)  [] Rubber Chemicals (22 tests)  [x] Plastics (26 tests)  [] Colorants/Dyes/Food additives (20 tests)  [x] Metals (implants/dental) (24 tests)  [] Local anaesthetics/NSAIDs (13 tests)  [] Antibiotics & Antimycotics (14 tests)   [] Corticosteroids (15 tests)   [] Photopatch test (62 tests)   [] others: ...      [x]  Patient's own products: Fluocinonide gel, Kiehls lip balm, gloss, Sensodyne tooth paste    DO NOT test if chemical or biological identity is unknown!     always ask from patient the product information and safety sheets (MSDS)       Order for PRICK TESTS    Reason for tests (suspected allergy): atopy  Known previous allergies: grass pollens    Standardized prick panels  [x] Atopic panel (20 tests)  [] Pediatric Panel (12 tests)  [] Milk, Meat, Eggs, Grains (20 tests)   [] Dust, Epithelia, Feathers (10 tests)  [] Fish, Seafood, Shellfish (17 tests)  [] Nuts, Beans (14 tests)  [] Spice, Vegetable, Fruit (17 tests)  [] Pollen Panel = Tree, Grass, Weed (24 tests)  [] Others: ...      [] Patient's own products: ...    DO NOT test if chemical or biological identity is unknown!     always ask from patient the product information and safety sheets (MSDS)     Standardized intradermal tests  [x] Penicillium notatum [x] Aspergillus fumigatus [x] House dust mites D.far & D. pteron  [] Cat    [] dog  [] Others: ...  [] Bee venom   [] Wasp venom  !!Specific protocol with dilutions!!     ________________________________    RESULTS & EVALUATION of PATCH TESTS    Patch test readings after     [x] 2 days, [] 3 days [x] 4 days, [] 5 days,    Jul 12, 2021 application of patch tests with results:    STANDARD Series        # Substance 2 days 4 days remarks    1 Herminio Mix [C] - -     2 Colophony - -     3  2-Mercaptobenzothiazole  - -      4 Methylisothiazolinone - -     5 Carba Mix - -     6 Thiuram Mix [A] - -     7 Bisphenol A Epoxy Resin - -     8 B-Qvrj-Vtgcrkfcjhz-Formaldehyde Resin - -     9 Mercapto Mix [A] - -     10 Black Rubber Mix- PPD [B] - -     11 Potassium Dichromate  -  -     12 Balsam of [place] (Myroxylon Pereirae Resin) - -     13 Nickel Sulphate Hexahydrate - -     14 Mixed Dialkyl Thiourea - -     15 Paraben Mix [B] - -     16 Methyldibromo Glutaronitrile - -     17 Fragrance Mix - -     18  2-Bromo-2-Nitropropane-1,3-Diol (Bronopol) CT NA NA     19 Lyral - -     20 Tixocortol-21- Pivalate CT - -     21 Diazolidiyl Urea (Germall II) - -     22 Methyl Methacrylate NA NA     23 Cobalt (II) Chloride Hexahydrate - -     24 Fragrance Mix II  - -     25 Compositae Mix - -     26 Benzoyl Peroxide - -     27 Bacitracin - -     28 Formaldehyde - -     29 Methylchloroisothiazolinone / Methylisothiazolinone - -     30 Corticosteroid Mix CT - -     31 Sodium Lauryl Sulfate NA NA     32 Lanolin Alcohol - -     33 Turpentine NA NA     34 Cetylstearylalcohol - -     35 Chlorhexidine Dicluconate - -     36 Budenoside - -     37 Imidazolidinyl Urea  - -     38 Ethyl-2 Cyanoacrylate - -     39 Quaternium 15 (Dowicil 200) - -     40 Decyl Glucoside - -    PRESERVATIVES & ANTIMICROBIALS        # Substance 2 days 4 days remarks   41 1  1,2-Benzisothiazoline-3-One, Sodium Salt - -     2  1,3,5-Conor (2-Hydroxyethyl) - Hexahydrotriazine (Grotan BK) - -     3 9-Wgfkwlxcmowzq-1-Nitro-1, 3-Propanediol NA NA     4  3, 4, 4' - Triclocarban - -    45 5 4 - Chloro - 3 - Cresol - -     6 4 - Chloro - 4 - Xylenol (PCMX) - -     7 7-Ethylbicyclooxazolidine (Bioban [id number]) - -     8 Benzalkonium Chloride CT NA NA     9 Benzyl Alcohol - -    50 10 Cetalkonium Chloride NA NA     11 Cetylpyrimidine Chloride  - -     12 Chloroacetamide - -     13 DMDM Hydantoin - -     14 Glutaraldehyde - -    55 15 Triclosan - -     16 Glyoxal Trimeric Dihydrate - -     17 Iodopropynyl Butylcarbamate - -     18 Octylisothiazoline - -     19 Bithionol CT - -    60 20 Bioban P 1487 (Nitrobutyl) Morpholine/(Ethylnitro-Trimethylene) Dimorpholine - -     21 Phenoxyethanol - -     22 Phenyl Salicylate - -     23 Povidone Iodine - -     24 Sodium Benzoate - -    65 25 Sodium Disulfite - -     26 Sorbic Acid - -     27 Thimerosal       28 Melamine Formaldehyde Resin       29 Ethylenediamine Dihydrochloride        Parabens      70 30 Butyl-P-Hydroxybenzoate - -      31 Ethyl-P-Hydroxybenzoate - -     32 Methyl-P-Hydroxybenzoate - -    73 33 Propyl-P-Hydroxybenzoate NA NA    EMULSIFIERS & ADDITIVES       # Substance 2 days 4 days remarks   74 1 Polyethylene Glycol-400 NA NA    75 2 Cocamidopropyl Betaine NA NA     3 Amerchol L101 - -     4 Propylene Glycol - -     5 Triethanolamine - -     6 Sorbitane Sesquiolate CT - -    80 7 Isopropylmyristate NA NA     8 Polysorbate 80 CT - -     9 Amidoamine   (Stearamidopropyl Dimethylamine) - -     10 Oleamidopropyl Dimethylamine - -     11 Lauryl Glucoside - -    85 12 Coconut Diethanolamide  - -     13 2-Hydroxy-4-Methoxy Benzophenone (Oxybenzone) NA NA     14 Benzophenone-4 (Sulisobenzon) - -     15 Propolis - -     16 Dexpanthenol - -    90 17 Carboxymethyl Cellulose Sodium NA NA     18 Abitol - -     19 Tert-Butylhydroquinone - -     20 Benzyl Salicylate - -     21 Dimethylaminopropylamin (DMPA) CT? D053 NA NA    95 22 Zinc Pyrithione (Zinc Omadine) CT? Z006 NA NA     23 Conor(Hydroxymethyl) Nitromethane CT        Antioxidant - -     24 Dodecyl Gallate - -     25 Butylhydroxyanisole (BHA) - -     26 Butylhydroxytoluene (BHT) - -    100 27 Di-Alpha-Tocopherol (Vit E) - -    101 28 Propyl Gallate - -    PERFUMES, FLAVORS & PLANTS        # Substance 2 days 4 days remarks   102 1 Benzyl Cinnamate - -     2 Di-Limonene (Dipentene) - -     3 Cananga Odorata (Hipolito Mcmillan) (I) - -    105 4 Lichen Acid Mix - -     5 Mentha Piperita Oil (Peppermint Oil) - -     6 Sesquiterpenelactone mix - -     7 Tea Tree Oil, Oxidized - -     8 Wood Tar Mix - -    110 9 Abietic Acid - -     10 Lavendula Angustifolia Oil (Lavender Oil) - -     11 Fragrance mix II CT * - -      Fragrance Mix I       12 Oakmoss Absolute - -     13 Eugenol - -    115 14 Geraniol - -     15 Hydroxycitronellal - -     16 Isoeugenol - -     17 Cinnamic Aldehyde - -     18 Cinnamic Alcohol  - -      Fragrance mix II      120 19 Citronellol - -     20 Alpha-Hexylcinnamic Aldehyde    -  -     21 Citral - -     22 Farnesol - -    124 23 Coumarin - -      Hexylcinnamic aldehyde, Coumarin, Farnesol, Hydroxyisohexy3-cyclohexene carboxaldehyde, citral, citrolellol  HAIRDRESSER        # Substance 2 days 4 days remarks   125 1 P-Phenylenediamin  -  -     2 P-Toluenediamine Sulphate  -  -     3 Ammonium Thioglycolate - -     4 Ammonium Persulfate - -     5 Resorcinol - -    130 6 3-Aminophenol - -     7 P-Aminophenol - -     8 Glyceryl Monothioglycolate - -     9 Pyrogallol - -    134 10 P-Aminodiphenylamine - -    PLASTICS        # Substance 2 days 4 days remarks     Acrylates - -    135 1 2-Hydroxyethyl Methacrylate (HEMA) - -     2 1,4-Butandioldimethacrylate (BUDMA) - -     3  2-Ethylhexyl Acrylate - -     4 Bisphenol-A-Dimethacrylate  - -     5 Diurethane-Dimethacrylate NA NA    140 6 Ethyleneglycoldimethacrylate (EGDMA) - -     7 Pentaerythritoltriacrylate (NERISSA) - -     8 Triethylene Glycol Dimethacrylate (TEGDMA) - -      Synthetic material/additives        9 E-Bwgs-Ubtjmbnigeq - -     10 Tricresyl Phosphate - -    145 11 3-Szlq-Zwkmmblglscto - -     12 Bis (2-Ethylhexyl) Phthalate - -     13 Dibutylphthalate - -     14 Dimethylphthalate - -     15 Toluene-2,4-Diisocyanate NA NA    150 16 Diphenylmethane-4,4''-Diisocyanate - -      EPOXY RESIN SYSTEMS        Reactive Solvents - -     17 Cresyl Glycidyl Ether - -     18 Butyl Glycidyl Ether - -     19 Phenyl Glycidyl Ether - -     20 1,4-Butanediol Diglycidyl Ether - -    155 21 1,6-Hexanediole Diglycidyl Ether - -      Hardener / Accelerator - -     22 Triethylenetetramine - -     23 Diethylenetriamine - -     24 Isophorone Diamine (IPD) - -     25 N,N-Dimethyl-P-Toluidine - -    160 26 2-ejuq-Buqskwfoqjcfk (antioxidant/stabilizer) CT - -    161 27 8-isvp-Zhgrfxmhnnzbmzvgrgpcdxj resin PTBP CT  - -    METALS (Implants / Dental)        # Substance 2 days 4 days remarks   162 1 Ammonium Heptamolybdate (IV) - -     2 Ammonium Tetrachloroplatinate - -   "   3 Indium (III) Chloride - -    165 4 Iridium (III) Chloride - -     5 Ferric Chloride - -     6 Manganese (II) Chloride - -     7 Niobium (V) Chloride - -     8 Palladium Chloride - -    170 9 Silver Nitrate - -     10 Gold Sodium Thiosulfate - -     11 Tantal - -     12 Tin (II) Chloride - -     13 Titanium (IV) Oxide - -    175 14 Titanium - -     15 Tungstic Acid, Sod Salt Dihydrat - -     16 Vanadium Pentoxide - -     17 Wolfram - -     18 Zinc Chloride - -    180 19 Zirconium (IV) Oxide - -    x 20 Ammoniated Murcury - -    x 21 Copper Sulfate Pentahydrate - -    x 22 Amalgam  - -    x 23 Aluminum Hydroxide - -    x 24 Platinum Tetrachloride - -      OTHER PRODUCTS        No Substance Conc  % solv 2 days 4 days remarks   x 1 Fluocinonide 0.05% gel As is       x 2 Kiehls lip balm As is       x 3 Sensodye tooth paste 50 vas      x 4 Schneider salve As is       x 5 Buxom lip gloss As is        6          7          8          9          10         Patients own products:  è DO NOT test if chemical or biological identity is unknown!   - always ask from patient the product information and safety sheets and consult with the physician   - check neutral pH with pH indicator paper (do not test pH below 5 or over 8 or consult with physician)  - leave-on cosmetics can be tested \"as is\"  - rinse-off products have to be diluted with water, buffer, olive oil or paraffin (discuss with physician)  à remember:   - non-specific toxic/corrosive or biological reactions can occur  - tests with patients own products are not standardized and test conditions are not optimized for patch tests. Whenever possible test with standardized test series and correlate use of product with the result of the patch tests  - if not sure if compounds can be tested under occlusion in patch tests consider open application tests    Results of patch tests:                         Interpretation:  - Negative                    A    = " Allergic      (+) Erythema    TI   = Toxic/irritant   + E + Infiltration    RaP = Relevance at Present     ++ E/I + Papulovesicle   Rpr  = Relevance Previously     +++ E/I/P + Blister     nR   = No Relevance      Atopy Screen (Placed xx/xx/21)    No Substance Readings (15 min) Evaluation   POS Histamine 1mg/ml -    NEG NaCl 0.9% -      No Substance Readings (15 min) Evaluation   1 Alternaria alternata (tenuis)  -    2 Cladosporium herbarum -    3 Aspergillus fumigatus -    4 Penicillium notatum -    5 Dermatophagoides pteronyssinus -    6 Dermatophagoides farinae -    7 Dog epithelium (canis spp) -    8 Cat hair (immanuel catus) -    9 Cockroach   (Blatella americana & germanica) -    10 Grass mix midwest   (Nohelia, Orchard, Redtop, Kana) -    11 Flavio grass (sorghum halepense) -    12 Weed mix   (common Cocklebur, Lamb s quarters, rough redroot Pigweed, Dock/Sorrel) -    13 Mug wort (artemisia vulgare) -    14 Ragweed giant/short (ambrosia spp) -    15 White birch (Betula papyrifera) -    16 Tree mix 1 (Pecan, Maple BHR, Oak RVW, american Garner, black Drasco) -    17 Red cedar (juniperus virginia) -    18 Tree mix 2   (white Jonnathan, river/red Birch, black [place], common [place], american Elm) -    19 Box elder/Maple mix (acer spp) -    20 [place] shagbark (carya ovata) -           Conclusion:       Intradermal Testing (Placed xx/xx/21)    No Substance Conc.  Reading (15min)  immediate Papule [mm] / Erythema [mm] Reading   (... days)  delayed Papule [mm] / Erythema [mm] Remarks   - NaCl  0.9% -   -    + Histamine (prick) 0.1mg/ml -   -    DF Standard Dust Mite - D. Farinae 1:10 -   -    DP Standard Dust Mite - D. Pteronyssinus 1:10 -   -    A Aspergillus fumigatus  1:10 -   -    P Penicillium notatum 1:10 -   -      1:10 -   -      1:10 -   -    Conclusions:     [] No relevant allergic reaction observed    [] Allergic reaction diagnosed against following allergens:      Interpretation/ remarks:   See later    []  Patient information given   [] ACDS CAMP information's (# ....) to following compounds: .....   [] General information's to following compounds: ......    Assessment & Plan:    ==> Final Diagnosis:     # chronic swelling and eczematous reaction on lower lip DDx allergic contact dermatitis to topicals or atopic dermatitis  * chronic with acute exacerbation      # Atopic predisposition with:    Seasonal RC in summer to grass pollens    RCA and contact Urticaria to cats/horses    Possible atopic dermatitis  * chronic with acute exacerbation      # 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    ==> Treatment Plan:  >> try Protopic oint 0.1% on lips  >> try pure vaseline and stop everything else on lips       Procedures Performed:Allergy tests, includin patch tests    Staff: : provider    Follow-up in Derm-Allergy clinic for 1st readings of patch tests after 2 days (virtual) and 2nd readings and final conclusions after 4 days (in person)   ___________________________    I spent a total of 32 minutes with Abigail Lam during today s  visit. This time was spent discussing all the individual test results, correlating them to the clinical relevance, counseling the patient and/or coordinating care and performing allergy tests

## 2021-07-12 ENCOUNTER — OFFICE VISIT (OUTPATIENT)
Dept: ALLERGY | Facility: CLINIC | Age: 66
End: 2021-07-12
Payer: MEDICARE

## 2021-07-12 DIAGNOSIS — L20.89 OTHER ATOPIC DERMATITIS: ICD-10-CM

## 2021-07-12 DIAGNOSIS — L23.89 ALLERGIC CONTACT DERMATITIS DUE TO OTHER AGENTS: Primary | ICD-10-CM

## 2021-07-12 DIAGNOSIS — Z88.9 ATOPY: ICD-10-CM

## 2021-07-12 PROCEDURE — 95044 PATCH/APPLICATION TESTS: CPT | Mod: 59 | Performed by: DERMATOLOGY

## 2021-07-12 NOTE — NURSING NOTE
Dermatology Rooming Note    Abigail Lam's goals for this visit include:   Chief Complaint   Patient presents with     Allergies     Abigail is here today for a patch testing day 1      NAYANA Tate

## 2021-07-12 NOTE — LETTER
7/12/2021         RE: Abigail Lam  [address]  [facility] [address]        Dear Colleague,    Thank you for referring your patient, Abigail Lam, to the [facility] [place]. Please see a copy of my visit note below.    [facility] Dermato-allergology Note  Office visit  Encounter Date: Jul 12, 2021  ____________________________________________    CC: No chief complaint on file.      HPI:  Ms. Abigail Lam is a(n) 66 year old female who presents today as a return patient for allergy consultation  - Follow-up in Derm-Allergy clinic for prick and patch tests (stop antihistamines 5 days prior)  Discussed long time with patient the need for the patch tests and that we have to find out if there is a contact allergy. Patient somehow worried about possible long term reactions to the patch tests and explained to her that this is unlikely the case. Did not want to have patch tests on the arms (has wedding of son next week), could not add the last set of patch tests with patients own and 5 last ones of the metal series.   - otherwise feeling well in usual state of health    Physical exam:  General: In no acute distress, well-developed, well-nourished  Eyes: no conjunctivitis  ENT: no signs of rhinitis   Pulmonary: no wheezing or coughing  Skin: Focused examination of the skin on test sites was performed = see test results below  No active eczematous skin lesions on tests sites, particularly back    Earlier History and Allergy exams:  - 17th February 2021 2nd Pfizer COVID vaccine and basically photo from March 2nd already has shown some dermatitis on the lower lip with desqamation  - starting in Mid April in [place] in [facility] for recurrent diffuse swelling of the lower lip with slight erythema of the perioral area = got Elidel and Cetirizine  - patient went to the oral surgeon and he gave a fluocinonide Gel = was worsening and irrititating  - had reaction to a Fresh  Shampoo  - Kiehls lip balm; Sensodyne tooth paste, Buxom lip gloss  - otherwise feeling well in usual state of health    Past Medical History:   Patient Active Problem List   Diagnosis     Personal history of malignant neoplasm of breast     Acute post-operative pain     No past medical history on file.    Allergies:  Allergies   Allergen Reactions     Diphtheria Toxoid-Tetanus Tox-Thimerosal      Other reaction(s): Edema     Ketorolac      Other reaction(s): Hypertension     Ketorolac Tromethamine      Other reaction(s): Other (See Comments)  Creates high blood pressure     Toradol        Medications:  Current Outpatient Medications   Medication     atorvastatin (LIPITOR) 10 MG tablet     Ca Carbonate-Mag Hydroxide (ROLAIDS PO)     Cholecalciferol (VITAMIN D) 2000 UNIT tablet     clobetasol (TEMOVATE) 0.05 % ointment     Cyanocobalamin (VITAMIN B-12 IJ)     estradiol (VAGIFEM) 10 MCG TABS     famotidine (PEPCID) 40 MG tablet     fluticasone (FLONASE) 50 MCG/ACT spray     hyoscyamine (ANASPAZ/LEVSIN) 0.125 MG tablet     lidocaine (LIDODERM) 5 % Patch     lorazepam (ATIVAN) 0.5 MG tablet     Lysine 500 MG CAPS     LYSINE PO     Magnesium Citrate 100 MG TABS     Omeprazole-Sodium Bicarbonate  MG CAPS     ondansetron (ZOFRAN-ODT) 4 MG ODT tab     Pseudoephedrine HCl (SUDAFED PO)     Psyllium (FIBER) 0.52 G CAPS     Ranitidine HCl (ZANTAC PO)     sucralfate (CARAFATE) 1 GM/10ML suspension     tacrolimus (PROTOPIC) 0.1 % external ointment     tamoxifen (NOLVADEX) 20 MG tablet     No current facility-administered medications for this visit.        Social History:    Family History:  Family History   Problem Relation Age of Onset     Macular Degeneration Mother      Glaucoma No family hx of        Previous Labs, Allergy Tests, Dermatopathology, Imaging:  See the documents given by the patient from the Dermatology in [facility] and the local allergy specialist    Referred By: No referring provider defined for this  encounter.     Allergy Tests:    Past Allergy Test    Order for Future Allergy Testing:    [x] Outpatient  [] Inpatient: Akbar..../ Bed ....       Skin Atopy (atopic dermatitis) [] Yes   [x] No .........  Contact allergies:   [x] Yes   [] No .maybe lip balm  Hand eczema:   [] Yes   [] No           Leading hand:   [] R   [] L       [] Ambidextrous         Drug allergies:        [] Yes   [] No  which?......    Urticaria/Angioedema  [] Yes   [] No .........  Food Allergy:  [] Yes   [] No  which?......  Pets :  [] Yes   [] No  which?......         [x]  Rhinitis   [x] Conjunctivitis   [] Sinusitis   [] Polyposis   [] Otitis   [] Pharyngitis         []  none  Operations:   [] Tonsils   [] Septum   [] Sinus   [] Polyposis        [x] Asthma bronchiale   [] Coughing      []  none  To cats  Symptoms (mostly Rhinoconjunctivitis and Asthma) aggravated by:  Season   [] I   [] II   [] III   [] IV   [x]V   [x]VI   []VII   []VIII   []IX   []X   []XI   []XI     [] perennial   Day time      [] morning   [] noon      [] evening        [] night    [] whole day........  [x]  none  Location/changes    [] inside        [] outside   [] mountains    [] sea     [] others.............   [x]  none  Triggers, specific     [x] animals     [] plants     [] dust              [] others ...........................    []  none  Triggers, others       [] work          [] psyche    [] sport            [] others .............................  [x]  none  Irritant                [] phys efforts [] smoke    [] heat/cold     [] odors  []others............... [x]  none    Order for PATCH TESTS  Reason for tests (suspected allergy): recurrent dermatitis  Known previous allergies: none  Standardized panels  [x] Standard panel (40 tests)  [x] Preservatives & Antimicrobials (31 tests)  [x] Emulsifiers & Additives (25 tests)   [x] Perfumes/Flavours & Plants (25 tests)  [x] Hairdresser panel (12 tests)  [] Rubber Chemicals (22 tests)  [x] Plastics (26 tests)  []  Colorants/Dyes/Food additives (20 tests)  [x] Metals (implants/dental) (24 tests)  [] Local anaesthetics/NSAIDs (13 tests)  [] Antibiotics & Antimycotics (14 tests)   [] Corticosteroids (15 tests)   [] Photopatch test (62 tests)   [] others: ...      [x] Patient's own products: Fluocinonide gel, Kiehls lip balm, gloss, Sensodyne tooth paste    DO NOT test if chemical or biological identity is unknown!     always ask from patient the product information and safety sheets (MSDS)       Order for PRICK TESTS    Reason for tests (suspected allergy): atopy  Known previous allergies: grass pollens    Standardized prick panels  [x] Atopic panel (20 tests)  [] Pediatric Panel (12 tests)  [] Milk, Meat, Eggs, Grains (20 tests)   [] Dust, Epithelia, Feathers (10 tests)  [] Fish, Seafood, Shellfish (17 tests)  [] Nuts, Beans (14 tests)  [] Spice, Vegetable, Fruit (17 tests)  [] Pollen Panel = Tree, Grass, Weed (24 tests)  [] Others: ...      [] Patient's own products: ...    DO NOT test if chemical or biological identity is unknown!     always ask from patient the product information and safety sheets (MSDS)     Standardized intradermal tests  [x] Penicillium notatum [x] Aspergillus fumigatus [x] House dust mites D.far & D. pteron  [] Cat    [] dog  [] Others: ...  [] Bee venom   [] Wasp venom  !!Specific protocol with dilutions!!     ________________________________    RESULTS & EVALUATION of PATCH TESTS    Patch test readings after     [x] 2 days, [] 3 days [x] 4 days, [] 5 days,    Jul 12, 2021 application of patch tests with results:    STANDARD Series        # Substance 2 days 4 days remarks    1 Herminio Mix [C] - -     2 Colophony - -     3  2-Mercaptobenzothiazole  - -      4 Methylisothiazolinone - -     5 Carba Mix - -     6 Thiuram Mix [A] - -     7 Bisphenol A Epoxy Resin - -     8 V-Tpbm-Kyyhenxrzmr-Formaldehyde Resin - -     9 Mercapto Mix [A] - -     10 Black Rubber Mix- PPD [B] - -     11 Potassium Dichromate  -  -      12 Balsam of [place] (Myroxylon Pereirae Resin) - -     13 Nickel Sulphate Hexahydrate - -     14 Mixed Dialkyl Thiourea - -     15 Paraben Mix [B] - -     16 Methyldibromo Glutaronitrile - -     17 Fragrance Mix - -     18 2-Bromo-2-Nitropropane-1,3-Diol (Bronopol) CT NA NA     19 Lyral - -     20 Tixocortol-21- Pivalate CT - -     21 Diazolidiyl Urea (Germall II) - -     22 Methyl Methacrylate NA NA     23 Cobalt (II) Chloride Hexahydrate - -     24 Fragrance Mix II  - -     25 Compositae Mix - -     26 Benzoyl Peroxide - -     27 Bacitracin - -     28 Formaldehyde - -     29 Methylchloroisothiazolinone / Methylisothiazolinone - -     30 Corticosteroid Mix CT - -     31 Sodium Lauryl Sulfate NA NA     32 Lanolin Alcohol - -     33 Turpentine NA NA     34 Cetylstearylalcohol - -     35 Chlorhexidine Dicluconate - -     36 Budenoside - -     37 Imidazolidinyl Urea  - -     38 Ethyl-2 Cyanoacrylate - -     39 Quaternium 15 (Dowicil 200) - -     40 Decyl Glucoside - -    PRESERVATIVES & ANTIMICROBIALS        # Substance 2 days 4 days remarks   41 1  1,2-Benzisothiazoline-3-One, Sodium Salt - -     2  1,3,5-Conor (2-Hydroxyethyl) - Hexahydrotriazine (Grotan BK) - -     3 7-Dshyvytbbeacc-7-Nitro-1, 3-Propanediol NA NA     4  3, 4, 4' - Triclocarban - -    45 5 4 - Chloro - 3 - Cresol - -     6 4 - Chloro - 4 - Xylenol (PCMX) - -     7 7-Ethylbicyclooxazolidine (Bioban II2978) - -     8 Benzalkonium Chloride CT NA NA     9 Benzyl Alcohol - -    50 10 Cetalkonium Chloride NA NA     11 Cetylpyrimidine Chloride  - -     12 Chloroacetamide - -     13 DMDM Hydantoin - -     14 Glutaraldehyde - -    55 15 Triclosan - -     16 Glyoxal Trimeric Dihydrate - -     17 Iodopropynyl Butylcarbamate - -     18 Octylisothiazoline - -     19 Bithionol CT - -    60 20 Bioban P 1487 (Nitrobutyl) Morpholine/(Ethylnitro-Trimethylene) Dimorpholine - -     21 Phenoxyethanol - -     22 Phenyl Salicylate - -     23 Povidone Iodine - -     24  Sodium Benzoate - -    65 25 Sodium Disulfite - -     26 Sorbic Acid - -     27 Thimerosal       28 Melamine Formaldehyde Resin       29 Ethylenediamine Dihydrochloride        Parabens      70 30 Butyl-P-Hydroxybenzoate - -     31 Ethyl-P-Hydroxybenzoate - -     32 Methyl-P-Hydroxybenzoate - -    73 33 Propyl-P-Hydroxybenzoate NA NA    EMULSIFIERS & ADDITIVES       # Substance 2 days 4 days remarks   74 1 Polyethylene Glycol-400 NA NA    75 2 Cocamidopropyl Betaine NA NA     3 Amerchol L101 - -     4 Propylene Glycol - -     5 Triethanolamine - -     6 Sorbitane Sesquiolate CT - -    80 7 Isopropylmyristate NA NA     8 Polysorbate 80 CT - -     9 Amidoamine   (Stearamidopropyl Dimethylamine) - -     10 Oleamidopropyl Dimethylamine - -     11 Lauryl Glucoside - -    85 12 Coconut Diethanolamide  - -     13 2-Hydroxy-4-Methoxy Benzophenone (Oxybenzone) NA NA     14 Benzophenone-4 (Sulisobenzon) - -     15 Propolis - -     16 Dexpanthenol - -    90 17 Carboxymethyl Cellulose Sodium NA NA     18 Abitol - -     19 Tert-Butylhydroquinone - -     20 Benzyl Salicylate - -     21 Dimethylaminopropylamin (DMPA) CT? D053 NA NA    95 22 Zinc Pyrithione (Zinc Omadine) CT? Z006 NA NA     23 Conor(Hydroxymethyl) Nitromethane CT        Antioxidant - -     24 Dodecyl Gallate - -     25 Butylhydroxyanisole (BHA) - -     26 Butylhydroxytoluene (BHT) - -    100 27 Di-Alpha-Tocopherol (Vit E) - -    101 28 Propyl Gallate - -    PERFUMES, FLAVORS & PLANTS        # Substance 2 days 4 days remarks   102 1 Benzyl Cinnamate - -     2 Di-Limonene (Dipentene) - -     3 Cananga Odorata (Ylang Ylang) (I) - -    105 4 Lichen Acid Mix - -     5 Mentha Piperita Oil (Peppermint Oil) - -     6 Sesquiterpenelactone mix - -     7 Tea Tree Oil, Oxidized - -     8 Wood Tar Mix - -    110 9 Abietic Acid - -     10 Lavendula Angustifolia Oil (Lavender Oil) - -     11 Fragrance mix II CT * - -      Fragrance Mix I       12 Oakmoss Absolute - -     13 Eugenol  - -    115 14 Geraniol - -     15 Hydroxycitronellal - -     16 Isoeugenol - -     17 Cinnamic Aldehyde - -     18 Cinnamic Alcohol  - -      Fragrance mix II      120 19 Citronellol - -     20 Alpha-Hexylcinnamic Aldehyde    - -     21 Citral - -     22 Farnesol - -    124 23 Coumarin - -      Hexylcinnamic aldehyde, Coumarin, Farnesol, Hydroxyisohexy3-cyclohexene carboxaldehyde, citral, citrolellol  HAIRDRESSER        # Substance 2 days 4 days remarks   125 1 P-Phenylenediamin  -  -     2 P-Toluenediamine Sulphate  -  -     3 Ammonium Thioglycolate - -     4 Ammonium Persulfate - -     5 Resorcinol - -    130 6 3-Aminophenol - -     7 P-Aminophenol - -     8 Glyceryl Monothioglycolate - -     9 Pyrogallol - -    134 10 P-Aminodiphenylamine - -    PLASTICS        # Substance 2 days 4 days remarks     Acrylates - -    135 1 2-Hydroxyethyl Methacrylate (HEMA) - -     2 1,4-Butandioldimethacrylate (BUDMA) - -     3  2-Ethylhexyl Acrylate - -     4 Bisphenol-A-Dimethacrylate  - -     5 Diurethane-Dimethacrylate NA NA    140 6 Ethyleneglycoldimethacrylate (EGDMA) - -     7 Pentaerythritoltriacrylate (NERISSA) - -     8 Triethylene Glycol Dimethacrylate (TEGDMA) - -      Synthetic material/additives        9 J-Jvqx-Krojjdforqx - -     10 Tricresyl Phosphate - -    145 11 3-Trwi-Blbyxbgmalayj - -     12 Bis (2-Ethylhexyl) Phthalate - -     13 Dibutylphthalate - -     14 Dimethylphthalate - -     15 Toluene-2,4-Diisocyanate NA NA    150 16 Diphenylmethane-4,4''-Diisocyanate - -      EPOXY RESIN SYSTEMS        Reactive Solvents - -     17 Cresyl Glycidyl Ether - -     18 Butyl Glycidyl Ether - -     19 Phenyl Glycidyl Ether - -     20 1,4-Butanediol Diglycidyl Ether - -    155 21 1,6-Hexanediole Diglycidyl Ether - -      Hardener / Accelerator - -     22 Triethylenetetramine - -     23 Diethylenetriamine - -     24 Isophorone Diamine (IPD) - -     25 N,N-Dimethyl-P-Toluidine - -    160 26 1-ecck-Iueyvggzacmuw  "(antioxidant/stabilizer) CT - -    161 27 8-fhzd-Gdfmdwqlcetlbyiwbzyugun resin PTBP CT  - -    METALS (Implants / Dental)        # Substance 2 days 4 days remarks   162 1 Ammonium Heptamolybdate (IV) - -     2 Ammonium Tetrachloroplatinate - -     3 Indium (III) Chloride - -    165 4 Iridium (III) Chloride - -     5 Ferric Chloride - -     6 Manganese (II) Chloride - -     7 Niobium (V) Chloride - -     8 Palladium Chloride - -    170 9 Silver Nitrate - -     10 Gold Sodium Thiosulfate - -     11 Tantal - -     12 Tin (II) Chloride - -     13 Titanium (IV) Oxide - -    175 14 Titanium - -     15 Tungstic Acid, Sod Salt Dihydrat - -     16 Vanadium Pentoxide - -     17 Wolfram - -     18 Zinc Chloride - -    180 19 Zirconium (IV) Oxide - -    x 20 Ammoniated Murcury - -    x 21 Copper Sulfate Pentahydrate - -    x 22 Amalgam  - -    x 23 Aluminum Hydroxide - -    x 24 Platinum Tetrachloride - -      OTHER PRODUCTS        No Substance Conc  % solv 2 days 4 days remarks   x 1 Fluocinonide 0.05% gel As is       x 2 Kiehls lip balm As is       x 3 Sensodye tooth paste 50 vas      x 4 Nikolski salve As is       x 5 Buxom lip gloss As is        6          7          8          9          10         Patients own products:  è DO NOT test if chemical or biological identity is unknown!   - always ask from patient the product information and safety sheets and consult with the physician   - check neutral pH with pH indicator paper (do not test pH below 5 or over 8 or consult with physician)  - leave-on cosmetics can be tested \"as is\"  - rinse-off products have to be diluted with water, buffer, olive oil or paraffin (discuss with physician)  à remember:   - non-specific toxic/corrosive or biological reactions can occur  - tests with patients own products are not standardized and test conditions are not optimized for patch tests. Whenever possible test with standardized test series and correlate use of product with the result of " the patch tests  - if not sure if compounds can be tested under occlusion in patch tests consider open application tests    Results of patch tests:                         Interpretation:  - Negative                    A    = Allergic      (+) Erythema    TI   = Toxic/irritant   + E + Infiltration    RaP = Relevance at Present     ++ E/I + Papulovesicle   Rpr  = Relevance Previously     +++ E/I/P + Blister     nR   = No Relevance      Atopy Screen (Placed xx/xx/21)    No Substance Readings (15 min) Evaluation   POS Histamine 1mg/ml -    NEG NaCl 0.9% -      No Substance Readings (15 min) Evaluation   1 Alternaria alternata (tenuis)  -    2 Cladosporium herbarum -    3 Aspergillus fumigatus -    4 Penicillium notatum -    5 Dermatophagoides pteronyssinus -    6 Dermatophagoides farinae -    7 Dog epithelium (canis spp) -    8 Cat hair (immanuel catus) -    9 Cockroach   (Blatella americana & germanica) -    10 Grass mix midwest   (Nohelia, Orchard, Redtop, Kana) -    11 Flavio grass (sorghum halepense) -    12 Weed mix   (common Cocklebur, Lamb s quarters, rough redroot Pigweed, Dock/Sorrel) -    13 Mug wort (artemisia vulgare) -    14 Ragweed giant/short (ambrosia spp) -    15 White birch (Betula papyrifera) -    16 Tree mix 1 (Pecan, Maple BHR, Oak RVW, american [place], black Thorpe) -    17 Red cedar (juniperus virginia) -    18 Tree mix 2   (white Jonnathan, river/red Birch, black Felton, common Highlands, american Elm) -    19 Box elder/Maple mix (acer spp) -    20 Bland shagbark (carya ovata) -           Conclusion:       Intradermal Testing (Placed xx/xx/21)    No Substance Conc.  Reading (15min)  immediate Papule [mm] / Erythema [mm] Reading   (... days)  delayed Papule [mm] / Erythema [mm] Remarks   - NaCl  0.9% -   -    + Histamine (prick) 0.1mg/ml -   -    DF Standard Dust Mite - D. Farinae 1:10 -   -    DP Standard Dust Mite - D. Pteronyssinus 1:10 -   -    A Aspergillus fumigatus  1:10 -   -    P  Penicillium notatum 1:10 -   -      1:10 -   -      1:10 -   -    Conclusions:     [] No relevant allergic reaction observed    [] Allergic reaction diagnosed against following allergens:      Interpretation/ remarks:   See later    [] Patient information given   [] ACDS CAMP information's (# ....) to following compounds: .....   [] General information's to following compounds: ......    Assessment & Plan:    ==> Final Diagnosis:     # chronic swelling and eczematous reaction on lower lip DDx allergic contact dermatitis to topicals or atopic dermatitis  * chronic with acute exacerbation      # Atopic predisposition with:    Seasonal RC in summer to grass pollens    RCA and contact Urticaria to cats/horses    Possible atopic dermatitis  * chronic with acute exacerbation      # 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    ==> Treatment Plan:  >> try Protopic oint 0.1% on lips  >> try pure vaseline and stop everything else on lips       Procedures Performed:Allergy tests, includin patch tests    Staff: : provider    Follow-up in Derm-Allergy clinic for 1st readings of patch tests after 2 days (virtual) and 2nd readings and final conclusions after 4 days (in person)   ___________________________    I spent a total of 32 minutes with Abigail Lam during today s  visit. This time was spent discussing all the individual test results, correlating them to the clinical relevance, counseling the patient and/or coordinating care and performing allergy tests              Again, thank you for allowing me to participate in the care of your patient.        Sincerely,        Trevon Michel MD

## 2021-07-13 ENCOUNTER — OFFICE VISIT (OUTPATIENT)
Dept: ALLERGY | Facility: CLINIC | Age: 66
End: 2021-07-13
Payer: MEDICARE

## 2021-07-13 ENCOUNTER — TELEPHONE (OUTPATIENT)
Dept: ALLERGY | Facility: CLINIC | Age: 66
End: 2021-07-13

## 2021-07-13 DIAGNOSIS — J45.20 MILD INTERMITTENT ASTHMA WITHOUT COMPLICATION: ICD-10-CM

## 2021-07-13 DIAGNOSIS — L23.89 ALLERGIC CONTACT DERMATITIS DUE TO OTHER AGENTS: Primary | ICD-10-CM

## 2021-07-13 DIAGNOSIS — L20.89 OTHER ATOPIC DERMATITIS: ICD-10-CM

## 2021-07-13 DIAGNOSIS — Z88.9 ATOPY: ICD-10-CM

## 2021-07-13 PROCEDURE — 99213 OFFICE O/P EST LOW 20 MIN: CPT | Performed by: DERMATOLOGY

## 2021-07-13 NOTE — NURSING NOTE
Chief Complaint   Patient presents with     Allergy Testing Followup     Remove patches after 24 hours     Caitlin Palomino RN

## 2021-07-13 NOTE — PROGRESS NOTES
[facility] Dermato-allergology Note  Office visit  Encounter Date: Jul 13, 2021  ____________________________________________    CC: No chief complaint on file.      HPI:  Ms. Abigail Lam is a(n) 66 year old female who presents today as a return patient for allergy consultation  - Follow-up in Derm-Allergy clinic for 1st readings of patch tests after 24h. Patient could not stand the patches anymore and therefore we removed them earlier  - otherwise feeling well in usual state of health    Physical exam:  General: In no acute distress, well-developed, well-nourished  Eyes: no conjunctivitis  ENT: no signs of rhinitis   Pulmonary: no wheezing or coughing  Skin:Focused examination of the skin on test sites was performed = see test results below    Earlier History and Allergy exams:  Discussed long time with patient the need for the patch tests and that we have to find out if there is a contact allergy. Patient somehow worried about possible long term reactions to the patch tests and explained to her that this is unlikely the case. Did not want to have patch tests on the arms (has wedding of son next week), could not add the last set of patch tests with patients own and 5 last ones of the metal series.     Earlier History and Allergy exams:  - 17th February 2021 2nd Pfizer COVID vaccine and basically photo from March 2nd already has shown some dermatitis on the lower lip with desqamation  - starting in Mid April in [place] in [facility] for recurrent diffuse swelling of the lower lip with slight erythema of the perioral area = got Elidel and Cetirizine  - patient went to the oral surgeon and he gave a fluocinonide Gel = was worsening and irrititating  - had reaction to a Fresh Shampoo  - Kiehls lip balm; Sensodyne tooth paste, Buxom lip gloss  - otherwise feeling well in usual state of health    Past Medical History:   Patient Active Problem List   Diagnosis     Personal history of malignant neoplasm  of breast     Acute post-operative pain     No past medical history on file.    Allergies:  Allergies   Allergen Reactions     Diphtheria Toxoid-Tetanus Tox-Thimerosal      Other reaction(s): Edema     Ketorolac      Other reaction(s): Hypertension     Ketorolac Tromethamine      Other reaction(s): Other (See Comments)  Creates high blood pressure     Toradol        Medications:  Current Outpatient Medications   Medication     atorvastatin (LIPITOR) 10 MG tablet     Ca Carbonate-Mag Hydroxide (ROLAIDS PO)     Cholecalciferol (VITAMIN D) 2000 UNIT tablet     clobetasol (TEMOVATE) 0.05 % ointment     Cyanocobalamin (VITAMIN B-12 IJ)     estradiol (VAGIFEM) 10 MCG TABS     famotidine (PEPCID) 40 MG tablet     fluticasone (FLONASE) 50 MCG/ACT spray     hyoscyamine (ANASPAZ/LEVSIN) 0.125 MG tablet     lidocaine (LIDODERM) 5 % Patch     lorazepam (ATIVAN) 0.5 MG tablet     Lysine 500 MG CAPS     LYSINE PO     Magnesium Citrate 100 MG TABS     Omeprazole-Sodium Bicarbonate  MG CAPS     ondansetron (ZOFRAN-ODT) 4 MG ODT tab     Pseudoephedrine HCl (SUDAFED PO)     Psyllium (FIBER) 0.52 G CAPS     Ranitidine HCl (ZANTAC PO)     sucralfate (CARAFATE) 1 GM/10ML suspension     tacrolimus (PROTOPIC) 0.1 % external ointment     tamoxifen (NOLVADEX) 20 MG tablet     No current facility-administered medications for this visit.       Social History:    Family History:  Family History   Problem Relation Age of Onset     Macular Degeneration Mother      Glaucoma No family hx of        Previous Labs, Allergy Tests, Dermatopathology, Imaging:  See the documents given by the patient from the Dermatology in [facility] and the local allergy specialist    Referred By: No referring provider defined for this encounter.     Allergy Tests:    Past Allergy Test    Order for Future Allergy Testing:    [x] Outpatient  [] Inpatient: Akbar..../ Bed ....       Skin Atopy (atopic dermatitis) [] Yes   [x] No .........  Contact allergies:   [x] Yes   []  No .maybe lip balm  Hand eczema:   [] Yes   [] No           Leading hand:   [] R   [] L       [] Ambidextrous         Drug allergies:        [] Yes   [] No  which?......    Urticaria/Angioedema  [] Yes   [] No .........  Food Allergy:  [] Yes   [] No  which?......  Pets :  [] Yes   [] No  which?......         [x]  Rhinitis   [x] Conjunctivitis   [] Sinusitis   [] Polyposis   [] Otitis   [] Pharyngitis         []  none  Operations:   [] Tonsils   [] Septum   [] Sinus   [] Polyposis        [x] Asthma bronchiale   [] Coughing      []  none  To cats  Symptoms (mostly Rhinoconjunctivitis and Asthma) aggravated by:  Season   [] I   [] II   [] III   [] IV   [x]V   [x]VI   []VII   []VIII   []IX   []X   []XI   []XI     [] perennial   Day time      [] morning   [] noon      [] evening        [] night    [] whole day........  [x]  none  Location/changes    [] inside        [] outside   [] mountains    [] sea     [] others.............   [x]  none  Triggers, specific     [x] animals     [] plants     [] dust              [] others ...........................    []  none  Triggers, others       [] work          [] psyche    [] sport            [] others .............................  [x]  none  Irritant                [] phys efforts [] smoke    [] heat/cold     [] odors  []others............... [x]  none    Order for PATCH TESTS  Reason for tests (suspected allergy): recurrent dermatitis  Known previous allergies: none  Standardized panels  [x] Standard panel (40 tests)  [x] Preservatives & Antimicrobials (31 tests)  [x] Emulsifiers & Additives (25 tests)   [x] Perfumes/Flavours & Plants (25 tests)  [x] Hairdresser panel (12 tests)  [] Rubber Chemicals (22 tests)  [x] Plastics (26 tests)  [] Colorants/Dyes/Food additives (20 tests)  [x] Metals (implants/dental) (24 tests)  [] Local anaesthetics/NSAIDs (13 tests)  [] Antibiotics & Antimycotics (14 tests)   [] Corticosteroids (15 tests)   [] Photopatch test (62 tests)   []  others: ...      [x] Patient's own products: Fluocinonide gel, Kiehls lip balm, gloss, Sensodyne tooth paste    DO NOT test if chemical or biological identity is unknown!     always ask from patient the product information and safety sheets (MSDS)       Order for PRICK TESTS    Reason for tests (suspected allergy): atopy  Known previous allergies: grass pollens    Standardized prick panels  [x] Atopic panel (20 tests)  [] Pediatric Panel (12 tests)  [] Milk, Meat, Eggs, Grains (20 tests)   [] Dust, Epithelia, Feathers (10 tests)  [] Fish, Seafood, Shellfish (17 tests)  [] Nuts, Beans (14 tests)  [] Spice, Vegetable, Fruit (17 tests)  [] Pollen Panel = Tree, Grass, Weed (24 tests)  [] Others: ...      [] Patient's own products: ...    DO NOT test if chemical or biological identity is unknown!     always ask from patient the product information and safety sheets (MSDS)     Standardized intradermal tests  [x] Penicillium notatum [x] Aspergillus fumigatus [x] House dust mites D.far & D. pteron  [] Cat    [] dog  [] Others: ...  [] Bee venom   [] Wasp venom  !!Specific protocol with dilutions!!     ________________________________    RESULTS & EVALUATION of PATCH TESTS    Patch test readings after     [x] 2 days, [] 3 days [x] 4 days, [] 5 days,    Jul 13, 2021 application of patch tests with results:    STANDARD Series        # Substance 1 day 4 days remarks    1 Herminio Mix [C] - -     2 Colophony - -     3  2-Mercaptobenzothiazole  - -      4 Methylisothiazolinone - -     5 Carba Mix - -     6 Thiuram Mix [A] - -     7 Bisphenol A Epoxy Resin - -     8 Z-Wyur-Lkrqeezxzlz-Formaldehyde Resin - -     9 Mercapto Mix [A] - -     10 Black Rubber Mix- PPD [B] - -     11 Potassium Dichromate  -  -     12 Balsam of [place] (Myroxylon Pereirae Resin) - -     13 Nickel Sulphate Hexahydrate - -     14 Mixed Dialkyl Thiourea - -     15 Paraben Mix [B] - -     16 Methyldibromo Glutaronitrile - -     17 Fragrance Mix (+) -     18  2-Bromo-2-Nitropropane-1,3-Diol (Bronopol) CT NA NA     19 Lyral - -     20 Tixocortol-21- Pivalate CT - -     21 Diazolidiyl Urea (Germall II) - -     22 Methyl Methacrylate NA NA     23 Cobalt (II) Chloride Hexahydrate - -     24 Fragrance Mix II  - -     25 Compositae Mix - -     26 Benzoyl Peroxide - -     27 Bacitracin - -     28 Formaldehyde - -     29 Methylchloroisothiazolinone / Methylisothiazolinone - -     30 Corticosteroid Mix CT - -     31 Sodium Lauryl Sulfate NA NA     32 Lanolin Alcohol - -     33 Turpentine NA NA     34 Cetylstearylalcohol - -     35 Chlorhexidine Dicluconate - -     36 Budenoside - -     37 Imidazolidinyl Urea  - -     38 Ethyl-2 Cyanoacrylate - -     39 Quaternium 15 (Dowicil 200) - -     40 Decyl Glucoside - -    PRESERVATIVES & ANTIMICROBIALS        # Substance 1 day 4 days remarks   41 1  1,2-Benzisothiazoline-3-One, Sodium Salt - -     2  1,3,5-Conor (2-Hydroxyethyl) - Hexahydrotriazine (Grotan BK) - -     3 6-Mutghoblvrpaa-2-Nitro-1, 3-Propanediol NA NA     4  3, 4, 4' - Triclocarban - -    45 5 4 - Chloro - 3 - Cresol - -     6 4 - Chloro - 4 - Xylenol (PCMX) - -     7 7-Ethylbicyclooxazolidine (Bioban TC7743) - -     8 Benzalkonium Chloride CT NA NA     9 Benzyl Alcohol - -    50 10 Cetalkonium Chloride NA NA     11 Cetylpyrimidine Chloride  - -     12 Chloroacetamide - -     13 DMDM Hydantoin - -     14 Glutaraldehyde - -    55 15 Triclosan - -     16 Glyoxal Trimeric Dihydrate - -     17 Iodopropynyl Butylcarbamate - -     18 Octylisothiazoline - -     19 Bithionol CT - -    60 20 Bioban P 1487 (Nitrobutyl) Morpholine/(Ethylnitro-Trimethylene) Dimorpholine - -     21 Phenoxyethanol - -     22 Phenyl Salicylate - -     23 Povidone Iodine - -     24 Sodium Benzoate - -    65 25 Sodium Disulfite - -     26 Sorbic Acid - -     27 Thimerosal       28 Melamine Formaldehyde Resin       29 Ethylenediamine Dihydrochloride        Parabens      70 30 Butyl-P-Hydroxybenzoate - -      31 Ethyl-P-Hydroxybenzoate - -     32 Methyl-P-Hydroxybenzoate - -    73 33 Propyl-P-Hydroxybenzoate NA NA    EMULSIFIERS & ADDITIVES       # Substance 1 day 4 days remarks   74 1 Polyethylene Glycol-400 NA NA    75 2 Cocamidopropyl Betaine NA NA     3 Amerchol L101 - -     4 Propylene Glycol - -     5 Triethanolamine - -     6 Sorbitane Sesquiolate CT - -    80 7 Isopropylmyristate NA NA     8 Polysorbate 80 CT - -     9 Amidoamine   (Stearamidopropyl Dimethylamine) - -     10 Oleamidopropyl Dimethylamine - -     11 Lauryl Glucoside - -    85 12 Coconut Diethanolamide  - -     13 2-Hydroxy-4-Methoxy Benzophenone (Oxybenzone) NA NA     14 Benzophenone-4 (Sulisobenzon) - -     15 Propolis - -     16 Dexpanthenol - -    90 17 Carboxymethyl Cellulose Sodium NA NA     18 Abitol - -     19 Tert-Butylhydroquinone - -     20 Benzyl Salicylate - -     21 Dimethylaminopropylamin (DMPA) CT? D053 NA NA    95 22 Zinc Pyrithione (Zinc Omadine) CT? Z006 NA NA     23 Conor(Hydroxymethyl) Nitromethane CT        Antioxidant - -     24 Dodecyl Gallate - -     25 Butylhydroxyanisole (BHA) - -     26 Butylhydroxytoluene (BHT) - -    100 27 Di-Alpha-Tocopherol (Vit E) - -    101 28 Propyl Gallate - -    PERFUMES, FLAVORS & PLANTS        # Substance 1 day 4 days remarks   102 1 Benzyl Cinnamate - -     2 Di-Limonene (Dipentene) - -     3 Cananga Odorata (Hipolito Mcmillan) (I) - -    105 4 Lichen Acid Mix - -     5 Mentha Piperita Oil (Peppermint Oil) - -     6 Sesquiterpenelactone mix - -     7 Tea Tree Oil, Oxidized - -     8 Wood Tar Mix (+) -    110 9 Abietic Acid - -     10 Lavendula Angustifolia Oil (Lavender Oil) - -     11 Fragrance mix II CT * - -      Fragrance Mix I       12 [place]moss Absolute - -     13 Eugenol - -    115 14 Geraniol - -     15 Hydroxycitronellal - -     16 Isoeugenol - -     17 Cinnamic Aldehyde - -     18 Cinnamic Alcohol  - -      Fragrance mix II      120 19 Citronellol - -     20 Alpha-Hexylcinnamic Aldehyde    -  -     21 Citral - -     22 Farnesol - -    124 23 Coumarin - -      Hexylcinnamic aldehyde, Coumarin, Farnesol, Hydroxyisohexy3-cyclohexene carboxaldehyde, citral, citrolellol  HAIRDRESSER        # Substance 1 day 4 days remarks   125 1 P-Phenylenediamin  -  -     2 P-Toluenediamine Sulphate  -  -     3 Ammonium Thioglycolate - -     4 Ammonium Persulfate - -     5 Resorcinol - -    130 6 3-Aminophenol - -     7 P-Aminophenol - -     8 Glyceryl Monothioglycolate - -     9 Pyrogallol - -    134 10 P-Aminodiphenylamine - -    PLASTICS        # Substance 1 day 4 days remarks     Acrylates - -    135 1 2-Hydroxyethyl Methacrylate (HEMA) - -     2 1,4-Butandioldimethacrylate (BUDMA) - -     3  2-Ethylhexyl Acrylate - -     4 Bisphenol-A-Dimethacrylate  - -     5 Diurethane-Dimethacrylate NA NA    140 6 Ethyleneglycoldimethacrylate (EGDMA) - -     7 Pentaerythritoltriacrylate (NERISSA) - -     8 Triethylene Glycol Dimethacrylate (TEGDMA) - -      Synthetic material/additives        9 X-Wzdr-Eviktpkmgmw - -     10 Tricresyl Phosphate - -    145 11 5-Lnpo-Wnkjopieodfyz - -     12 Bis (2-Ethylhexyl) Phthalate - -     13 Dibutylphthalate - -     14 Dimethylphthalate - -     15 Toluene-2,4-Diisocyanate NA NA    150 16 Diphenylmethane-4,4''-Diisocyanate - -      EPOXY RESIN SYSTEMS        Reactive Solvents - -     17 Cresyl Glycidyl Ether - -     18 Butyl Glycidyl Ether - -     19 Phenyl Glycidyl Ether - -     20 1,4-Butanediol Diglycidyl Ether - -    155 21 1,6-Hexanediole Diglycidyl Ether - -      Hardener / Accelerator - -     22 Triethylenetetramine - -     23 Diethylenetriamine - -     24 Isophorone Diamine (IPD) - -     25 N,N-Dimethyl-P-Toluidine - -    160 26 2-ktut-Mncleullbheqi (antioxidant/stabilizer) CT - -    161 27 7-zuwi-Lfneuqmaebgdzetxizfyftn resin PTBP CT  - -    METALS (Implants / Dental)        # Substance 1 day 4 days remarks   162 1 Ammonium Heptamolybdate (IV) - -     2 Ammonium Tetrachloroplatinate - -      3 Indium (III) Chloride - -    165 4 Iridium (III) Chloride - -     5 Ferric Chloride - -     6 Manganese (II) Chloride - -     7 Niobium (V) Chloride - -     8 Palladium Chloride - -    170 9 Silver Nitrate - -     10 Gold Sodium Thiosulfate - -     11 Tantal - -     12 Tin (II) Chloride - -     13 Titanium (IV) Oxide - -    175 14 Titanium - -     15 Tungstic Acid, Sod Salt Dihydrat - -     16 Vanadium Pentoxide - -     17 Wolfram - -     18 Zinc Chloride - -    180 19 Zirconium (IV) Oxide - -    x 20 Ammoniated Murcury - -    x 21 Copper Sulfate Pentahydrate - -    x 22 Amalgam  - -    x 23 Aluminum Hydroxide - -    x 24 Platinum Tetrachloride - -      OTHER PRODUCTS        No Substance Conc  % solv 2 days 4 days remarks   x 1 Fluocinonide 0.05% gel As is       x 2 Kiehls lip balm As is       x 3 Sensodye tooth paste 50 vas      x 4 [place] salve As is       x 5 Buxom lip gloss As is        6          7          8          9          10           Results of patch tests:                         Interpretation:  - Negative                    A    = Allergic      (+) Erythema    TI   = Toxic/irritant   + E + Infiltration    RaP = Relevance at Present     ++ E/I + Papulovesicle   Rpr  = Relevance Previously     +++ E/I/P + Blister     nR   = No Relevance      Atopy Screen (Placed xx/xx/21)    No Substance Readings (15 min) Evaluation   POS Histamine 1mg/ml -    NEG NaCl 0.9% -      No Substance Readings (15 min) Evaluation   1 Alternaria alternata (tenuis)  -    2 Cladosporium herbarum -    3 Aspergillus fumigatus -    4 Penicillium notatum -    5 Dermatophagoides pteronyssinus -    6 Dermatophagoides farinae -    7 Dog epithelium (canis spp) -    8 Cat hair (immanuel catus) -    9 Cockroach   (Blatella americana & germanica) -    10 Grass mix midwest   (Nohelia, Orchard, [place], Kana) -    11 Flavio grass (sorghum halepense) -    12 Weed mix   (common Cocklebur, Lamb s quarters, rough redroot Pigweed, Dock/[place]) -     13 Mug wort (artemisia vulgare) -    14 Ragweed giant/short (ambrosia spp) -    15 White birch (Betula papyrifera) -    16 Tree mix 1 (Pecan, Maple BHR, Oak RVW, american [place], black [place]) -    17 Red cedar (juniperus virginia) -    18 Tree mix 2   (white Jon[place]athan, river/red Birch, black [place], common [place], american Elm) -    19 Box elder/Maple mix (acer spp) -    20 Martin shagbark (carya ovata) -           Conclusion:       Intradermal Testing (Placed xx/xx/21)    No Substance Conc.  Reading (15min)  immediate Papule [mm] / Erythema [mm] Reading   (... days)  delayed Papule [mm] / Erythema [mm] Remarks   - NaCl  0.9% -   -    + Histamine (prick) 0.1mg/ml -   -    DF Standard Dust Mite - D. Farinae 1:10 -   -    DP Standard Dust Mite - D. Pteronyssinus 1:10 -   -    A Aspergillus fumigatus  1:10 -   -    P Penicillium notatum 1:10 -   -      1:10 -   -      1:10 -   -    Conclusions:     [x] No relevant allergic reaction observed: slight irritation to fragrance mix    [] Allergic reaction diagnosed against following allergens:      Interpretation/ remarks:   See later    [] Patient information given   [] ACDS CAMP information's (# ....) to following compounds: .....   [] General information's to following compounds: ......    Assessment & Plan:    ==> Final Diagnosis:     # chronic swelling and eczematous reaction on lower lip DDx allergic contact dermatitis to topicals or atopic dermatitis  * chronic with acute exacerbation      # Atopic predisposition with:    Seasonal RC in summer to grass pollens    RCA and contact Urticaria to cats/horses    Possible atopic dermatitis  * chronic with acute exacerbation      # long term local reaction to Bee and Wasp Venom  * chronic, active    These conclusions are made at the best of one's knowledge and belief based on the provided evidence such as patient's history and allergy test results and they can change over time or can be incomplete because of missing  information's.    ==> Treatment Plan:  >> try Protopic oint 0.1% on lips  >> try pure vaseline and stop everything else on lips      Follow-up in Derm-Allergy clinic for 2nd readings and final conclusions after 4 days (in person)     Staff: : provider    ___________________________      I spent a total of 23 minutes with Abigail Lam during today s  visit. This time was spent discussing all the individual test results, correlating them to the clinical relevance, counseling the patient and/or coordinating care

## 2021-07-13 NOTE — LETTER
7/13/2021         RE: Abigail Lam  [address]  [facility] [address]        Dear Colleague,    Thank you for referring your patient, Abigail Lam, to the [facility] [place]. Please see a copy of my visit note below.    [facility] Dermato-allergology Note  Office visit  Encounter Date: Jul 13, 2021  ____________________________________________    CC: No chief complaint on file.      HPI:  Ms. Abigail Lam is a(n) 66 year old female who presents today as a return patient for allergy consultation  - Follow-up in Derm-Allergy clinic for 1st readings of patch tests after 24h. Patient could not stand the patches anymore and therefore we removed them earlier  - otherwise feeling well in usual state of health    Physical exam:  General: In no acute distress, well-developed, well-nourished  Eyes: no conjunctivitis  ENT: no signs of rhinitis   Pulmonary: no wheezing or coughing  Skin:Focused examination of the skin on test sites was performed = see test results below    Earlier History and Allergy exams:  Discussed long time with patient the need for the patch tests and that we have to find out if there is a contact allergy. Patient somehow worried about possible long term reactions to the patch tests and explained to her that this is unlikely the case. Did not want to have patch tests on the arms (has wedding of son next week), could not add the last set of patch tests with patients own and 5 last ones of the metal series.     Earlier History and Allergy exams:  - 17th February 2021 2nd Pfizer COVID vaccine and basically photo from March 2nd already has shown some dermatitis on the lower lip with desqamation  - starting in Mid April in [place] in [facility] for recurrent diffuse swelling of the lower lip with slight erythema of the perioral area = got Elidel and Cetirizine  - patient went to the oral surgeon and he gave a fluocinonide Gel = was worsening and  irrititating  - had reaction to a Fresh Shampoo  - Kiehls lip balm; Sensodyne tooth paste, Buxom lip gloss  - otherwise feeling well in usual state of health    Past Medical History:   Patient Active Problem List   Diagnosis     Personal history of malignant neoplasm of breast     Acute post-operative pain     No past medical history on file.    Allergies:  Allergies   Allergen Reactions     Diphtheria Toxoid-Tetanus Tox-Thimerosal      Other reaction(s): Edema     Ketorolac      Other reaction(s): Hypertension     Ketorolac Tromethamine      Other reaction(s): Other (See Comments)  Creates high blood pressure     Toradol        Medications:  Current Outpatient Medications   Medication     atorvastatin (LIPITOR) 10 MG tablet     Ca Carbonate-Mag Hydroxide (ROLAIDS PO)     Cholecalciferol (VITAMIN D) 2000 UNIT tablet     clobetasol (TEMOVATE) 0.05 % ointment     Cyanocobalamin (VITAMIN B-12 IJ)     estradiol (VAGIFEM) 10 MCG TABS     famotidine (PEPCID) 40 MG tablet     fluticasone (FLONASE) 50 MCG/ACT spray     hyoscyamine (ANASPAZ/LEVSIN) 0.125 MG tablet     lidocaine (LIDODERM) 5 % Patch     lorazepam (ATIVAN) 0.5 MG tablet     Lysine 500 MG CAPS     LYSINE PO     Magnesium Citrate 100 MG TABS     Omeprazole-Sodium Bicarbonate  MG CAPS     ondansetron (ZOFRAN-ODT) 4 MG ODT tab     Pseudoephedrine HCl (SUDAFED PO)     Psyllium (FIBER) 0.52 G CAPS     Ranitidine HCl (ZANTAC PO)     sucralfate (CARAFATE) 1 GM/10ML suspension     tacrolimus (PROTOPIC) 0.1 % external ointment     tamoxifen (NOLVADEX) 20 MG tablet     No current facility-administered medications for this visit.       Social History:    Family History:  Family History   Problem Relation Age of Onset     Macular Degeneration Mother      Glaucoma No family hx of        Previous Labs, Allergy Tests, Dermatopathology, Imaging:  See the documents given by the patient from the Dermatology in [facility] and the local allergy specialist    Referred By: No  referring provider defined for this encounter.     Allergy Tests:    Past Allergy Test    Order for Future Allergy Testing:    [x] Outpatient  [] Inpatient: Akbar..../ Bed ....       Skin Atopy (atopic dermatitis) [] Yes   [x] No .........  Contact allergies:   [x] Yes   [] No .maybe lip balm  Hand eczema:   [] Yes   [] No           Leading hand:   [] R   [] L       [] Ambidextrous         Drug allergies:        [] Yes   [] No  which?......    Urticaria/Angioedema  [] Yes   [] No .........  Food Allergy:  [] Yes   [] No  which?......  Pets :  [] Yes   [] No  which?......         [x]  Rhinitis   [x] Conjunctivitis   [] Sinusitis   [] Polyposis   [] Otitis   [] Pharyngitis         []  none  Operations:   [] Tonsils   [] Septum   [] Sinus   [] Polyposis        [x] Asthma bronchiale   [] Coughing      []  none  To cats  Symptoms (mostly Rhinoconjunctivitis and Asthma) aggravated by:  Season   [] I   [] II   [] III   [] IV   [x]V   [x]VI   []VII   []VIII   []IX   []X   []XI   []XI     [] perennial   Day time      [] morning   [] noon      [] evening        [] night    [] whole day........  [x]  none  Location/changes    [] inside        [] outside   [] mountains    [] sea     [] others.............   [x]  none  Triggers, specific     [x] animals     [] plants     [] dust              [] others ...........................    []  none  Triggers, others       [] work          [] psyche    [] sport            [] others .............................  [x]  none  Irritant                [] phys efforts [] smoke    [] heat/cold     [] odors  []others............... [x]  none    Order for PATCH TESTS  Reason for tests (suspected allergy): recurrent dermatitis  Known previous allergies: none  Standardized panels  [x] Standard panel (40 tests)  [x] Preservatives & Antimicrobials (31 tests)  [x] Emulsifiers & Additives (25 tests)   [x] Perfumes/Flavours & Plants (25 tests)  [x] Hairdresser panel (12 tests)  [] Rubber Chemicals (22  tests)  [x] Plastics (26 tests)  [] Colorants/Dyes/Food additives (20 tests)  [x] Metals (implants/dental) (24 tests)  [] Local anaesthetics/NSAIDs (13 tests)  [] Antibiotics & Antimycotics (14 tests)   [] Corticosteroids (15 tests)   [] Photopatch test (62 tests)   [] others: ...      [x] Patient's own products: Fluocinonide gel, Kiehls lip balm, gloss, Sensodyne tooth paste    DO NOT test if chemical or biological identity is unknown!     always ask from patient the product information and safety sheets (MSDS)       Order for PRICK TESTS    Reason for tests (suspected allergy): atopy  Known previous allergies: grass pollens    Standardized prick panels  [x] Atopic panel (20 tests)  [] Pediatric Panel (12 tests)  [] Milk, Meat, Eggs, Grains (20 tests)   [] Dust, Epithelia, Feathers (10 tests)  [] Fish, Seafood, Shellfish (17 tests)  [] Nuts, Beans (14 tests)  [] Spice, Vegetable, Fruit (17 tests)  [] Pollen Panel = Tree, Grass, Weed (24 tests)  [] Others: ...      [] Patient's own products: ...    DO NOT test if chemical or biological identity is unknown!     always ask from patient the product information and safety sheets (MSDS)     Standardized intradermal tests  [x] Penicillium notatum [x] Aspergillus fumigatus [x] House dust mites D.far & D. pteron  [] Cat    [] dog  [] Others: ...  [] Bee venom   [] Wasp venom  !!Specific protocol with dilutions!!     ________________________________    RESULTS & EVALUATION of PATCH TESTS    Patch test readings after     [x] 2 days, [] 3 days [x] 4 days, [] 5 days,    Jul 13, 2021 application of patch tests with results:    STANDARD Series        # Substance 1 day 4 days remarks    1 Herminio Mix [C] - -     2 Colophony - -     3  2-Mercaptobenzothiazole  - -      4 Methylisothiazolinone - -     5 Carba Mix - -     6 Thiuram Mix [A] - -     7 Bisphenol A Epoxy Resin - -     8 V-Nizs-Zulmralmzeq-Formaldehyde Resin - -     9 Mercapto Mix [A] - -     10 Black Rubber Mix- PPD [B] -  -     11 Potassium Dichromate  -  -     12 Balsam of [place] (Myroxylon Pereirae Resin) - -     13 Nickel Sulphate Hexahydrate - -     14 Mixed Dialkyl Thiourea - -     15 Paraben Mix [B] - -     16 Methyldibromo Glutaronitrile - -     17 Fragrance Mix (+) -     18 2-Bromo-2-Nitropropane-1,3-Diol (Bronopol) CT NA NA     19 Lyral - -     20 Tixocortol-21- Pivalate CT - -     21 Diazolidiyl Urea (Germall II) - -     22 Methyl Methacrylate NA NA     23 Cobalt (II) Chloride Hexahydrate - -     24 Fragrance Mix II  - -     25 Compositae Mix - -     26 Benzoyl Peroxide - -     27 Bacitracin - -     28 Formaldehyde - -     29 Methylchloroisothiazolinone / Methylisothiazolinone - -     30 Corticosteroid Mix CT - -     31 Sodium Lauryl Sulfate NA NA     32 Lanolin Alcohol - -     33 Turpentine NA NA     34 Cetylstearylalcohol - -     35 Chlorhexidine Dicluconate - -     36 Budenoside - -     37 Imidazolidinyl Urea  - -     38 Ethyl-2 Cyanoacrylate - -     39 Quaternium 15 (Dowicil 200) - -     40 Decyl Glucoside - -    PRESERVATIVES & ANTIMICROBIALS        # Substance 1 day 4 days remarks   41 1  1,2-Benzisothiazoline-3-One, Sodium Salt - -     2  1,3,5-Conor (2-Hydroxyethyl) - Hexahydrotriazine (Grotan BK) - -     3 9-Xtwrpsdsbqmps-3-Nitro-1, 3-Propanediol NA NA     4  3, 4, 4' - Triclocarban - -    45 5 4 - Chloro - 3 - Cresol - -     6 4 - Chloro - 4 - Xylenol (PCMX) - -     7 7-Ethylbicyclooxazolidine (Bioban [id number]) - -     8 Benzalkonium Chloride CT NA NA     9 Benzyl Alcohol - -    50 10 Cetalkonium Chloride NA NA     11 Cetylpyrimidine Chloride  - -     12 Chloroacetamide - -     13 DMDM Hydantoin - -     14 Glutaraldehyde - -    55 15 Triclosan - -     16 Glyoxal Trimeric Dihydrate - -     17 Iodopropynyl Butylcarbamate - -     18 Octylisothiazoline - -     19 Bithionol CT - -    60 20 Bioban P 1487 (Nitrobutyl) Morpholine/(Ethylnitro-Trimethylene) Dimorpholine - -     21 Phenoxyethanol - -     22 Phenyl Salicylate - -      23 Povidone Iodine - -     24 Sodium Benzoate - -    65 25 Sodium Disulfite - -     26 Sorbic Acid - -     27 Thimerosal       28 Melamine Formaldehyde Resin       29 Ethylenediamine Dihydrochloride        Parabens      70 30 Butyl-P-Hydroxybenzoate - -     31 Ethyl-P-Hydroxybenzoate - -     32 Methyl-P-Hydroxybenzoate - -    73 33 Propyl-P-Hydroxybenzoate NA NA    EMULSIFIERS & ADDITIVES       # Substance 1 day 4 days remarks   74 1 Polyethylene Glycol-400 NA NA    75 2 Cocamidopropyl Betaine NA NA     3 Amerchol L101 - -     4 Propylene Glycol - -     5 Triethanolamine - -     6 Sorbitane Sesquiolate CT - -    80 7 Isopropylmyristate NA NA     8 Polysorbate 80 CT - -     9 Amidoamine   (Stearamidopropyl Dimethylamine) - -     10 Oleamidopropyl Dimethylamine - -     11 Lauryl Glucoside - -    85 12 Coconut Diethanolamide  - -     13 2-Hydroxy-4-Methoxy Benzophenone (Oxybenzone) NA NA     14 Benzophenone-4 (Sulisobenzon) - -     15 Propolis - -     16 Dexpanthenol - -    90 17 Carboxymethyl Cellulose Sodium NA NA     18 Abitol - -     19 Tert-Butylhydroquinone - -     20 Benzyl Salicylate - -     21 Dimethylaminopropylamin (DMPA) CT? D053 NA NA    95 22 Zinc Pyrithione (Zinc Omadine) CT? Z006 NA NA     23 Conor(Hydroxymethyl) Nitromethane CT        Antioxidant - -     24 Dodecyl Gallate - -     25 Butylhydroxyanisole (BHA) - -     26 Butylhydroxytoluene (BHT) - -    100 27 Di-Alpha-Tocopherol (Vit E) - -    101 28 Propyl Gallate - -    PERFUMES, FLAVORS & PLANTS        # Substance 1 day 4 days remarks   102 1 Benzyl Cinnamate - -     2 Di-Limonene (Dipentene) - -     3 Cananga Odorata (Hipolito Mcmillan) (I) - -    105 4 Lichen Acid Mix - -     5 Mentha Piperita Oil (Peppermint Oil) - -     6 Sesquiterpenelactone mix - -     7 Tea Tree Oil, Oxidized - -     8 Wood Tar Mix (+) -    110 9 Abietic Acid - -     10 Lavendula Angustifolia Oil (Lavender Oil) - -     11 Fragrance mix II CT * - -      Fragrance Mix I       12  [facility] Absolute - -     13 Eugenol - -    115 14 Geraniol - -     15 Hydroxycitronellal - -     16 Isoeugenol - -     17 Cinnamic Aldehyde - -     18 Cinnamic Alcohol  - -      Fragrance mix II      120 19 Citronellol - -     20 Alpha-Hexylcinnamic Aldehyde    - -     21 Citral - -     22 Farnesol - -    124 23 Coumarin - -      Hexylcinnamic aldehyde, Coumarin, Farnesol, Hydroxyisohexy3-cyclohexene carboxaldehyde, citral, citrolellol  HAIRDRESSER        # Substance 1 day 4 days remarks   125 1 P-Phenylenediamin  -  -     2 P-Toluenediamine Sulphate  -  -     3 Ammonium Thioglycolate - -     4 Ammonium Persulfate - -     5 Resorcinol - -    130 6 3-Aminophenol - -     7 P-Aminophenol - -     8 Glyceryl Monothioglycolate - -     9 Pyrogallol - -    134 10 P-Aminodiphenylamine - -    PLASTICS        # Substance 1 day 4 days remarks     Acrylates - -    135 1 2-Hydroxyethyl Methacrylate (HEMA) - -     2 1,4-Butandioldimethacrylate (BUDMA) - -     3  2-Ethylhexyl Acrylate - -     4 Bisphenol-A-Dimethacrylate  - -     5 Diurethane-Dimethacrylate NA NA    140 6 Ethyleneglycoldimethacrylate (EGDMA) - -     7 Pentaerythritoltriacrylate (NERISSA) - -     8 Triethylene Glycol Dimethacrylate (TEGDMA) - -      Synthetic material/additives        9 U-Jush-Mkoycsrwjuc - -     10 Tricresyl Phosphate - -    145 11 9-Xbnc-Zroumfxaylhpw - -     12 Bis (2-Ethylhexyl) Phthalate - -     13 Dibutylphthalate - -     14 Dimethylphthalate - -     15 Toluene-2,4-Diisocyanate NA NA    150 16 Diphenylmethane-4,4''-Diisocyanate - -      EPOXY RESIN SYSTEMS        Reactive Solvents - -     17 Cresyl Glycidyl Ether - -     18 Butyl Glycidyl Ether - -     19 Phenyl Glycidyl Ether - -     20 1,4-Butanediol Diglycidyl Ether - -    155 21 1,6-Hexanediole Diglycidyl Ether - -      Hardener / Accelerator - -     22 Triethylenetetramine - -     23 Diethylenetriamine - -     24 Isophorone Diamine (IPD) - -     25 N,N-Dimethyl-P-Toluidine - -    160 26  8-ahte-Fkaemorpdnndo (antioxidant/stabilizer) CT - -    161 27 8-hifc-Rmgbvntleqnyffxilkqanmd resin PTBP CT  - -    METALS (Implants / Dental)        # Substance 1 day 4 days remarks   162 1 Ammonium Heptamolybdate (IV) - -     2 Ammonium Tetrachloroplatinate - -     3 Indium (III) Chloride - -    165 4 Iridium (III) Chloride - -     5 Ferric Chloride - -     6 Manganese (II) Chloride - -     7 Niobium (V) Chloride - -     8 Palladium Chloride - -    170 9 Silver Nitrate - -     10 Gold Sodium Thiosulfate - -     11 Tantal - -     12 Tin (II) Chloride - -     13 Titanium (IV) Oxide - -    175 14 Titanium - -     15 Tungstic Acid, Sod Salt Dihydrat - -     16 Vanadium Pentoxide - -     17 Wolfram - -     18 Zinc Chloride - -    180 19 Zirconium (IV) Oxide - -    x 20 Ammoniated Murcury - -    x 21 Copper Sulfate Pentahydrate - -    x 22 Amalgam  - -    x 23 Aluminum Hydroxide - -    x 24 Platinum Tetrachloride - -      OTHER PRODUCTS        No Substance Conc  % solv 2 days 4 days remarks   x 1 Fluocinonide 0.05% gel As is       x 2 Kiehls lip balm As is       x 3 Sensodye tooth paste 50 vas      x 4 Cloud salve As is       x 5 Buxom lip gloss As is        6          7          8          9          10           Results of patch tests:                         Interpretation:  - Negative                    A    = Allergic      (+) Erythema    TI   = Toxic/irritant   + E + Infiltration    RaP = Relevance at Present     ++ E/I + Papulovesicle   Rpr  = Relevance Previously     +++ E/I/P + Blister     nR   = No Relevance      Atopy Screen (Placed xx/xx/21)    No Substance Readings (15 min) Evaluation   POS Histamine 1mg/ml -    NEG NaCl 0.9% -      No Substance Readings (15 min) Evaluation   1 Alternaria alternata (tenuis)  -    2 Cladosporium herbarum -    3 Aspergillus fumigatus -    4 Penicillium notatum -    5 Dermatophagoides pteronyssinus -    6 Dermatophagoides farinae -    7 Dog epithelium (canis spp) -    8 Cat  hair (immanuel catus) -    9 Cockroach   (Blatella americana & germanica) -    10 Grass mix midwest   (Nohelia, Orchard, Redtop, Kana) -    11 Flavio grass (sorghum halepense) -    12 Weed mix   (common Cocklebur, Lamb s quarters, rough redroot Pigweed, Dock/Sorrel) -    13 Mug wort (artemisia vulgare) -    14 Ragweed giant/short (ambrosia spp) -    15 White birch (Betula papyrifera) -    16 Tree mix 1 (Pecan, Maple BHR, Oak RVW, american [place], black [place]) -    17 Red cedar (juniperus virginia) -    18 Tree mix 2   (white Jon[place], river/red Birch, black [place], common [place], american Elm) -    19 Box elder/Maple mix (acer spp) -    20 Sawyer shagbark (carya ovata) -           Conclusion:       Intradermal Testing (Placed xx/xx/21)    No Substance Conc.  Reading (15min)  immediate Papule [mm] / Erythema [mm] Reading   (... days)  delayed Papule [mm] / Erythema [mm] Remarks   - NaCl  0.9% -   -    + Histamine (prick) 0.1mg/ml -   -    DF Standard Dust Mite - D. Farinae 1:10 -   -    DP Standard Dust Mite - D. Pteronyssinus 1:10 -   -    A Aspergillus fumigatus  1:10 -   -    P Penicillium notatum 1:10 -   -      1:10 -   -      1:10 -   -    Conclusions:     [x] No relevant allergic reaction observed: slight irritation to fragrance mix    [] Allergic reaction diagnosed against following allergens:      Interpretation/ remarks:   See later    [] Patient information given   [] ACDS CAMP information's (# ....) to following compounds: .....   [] General information's to following compounds: ......    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    ==> Treatment Plan:  >> try Protopic oint 0.1% on lips  >> try pure vaseline and stop everything else on lips      Follow-up in Derm-Allergy clinic for 2nd readings and final conclusions after 4 days (in person)     Staff: : provider    ___________________________      I spent a total of 23 minutes with Abigail Lam during today s  visit. This time was spent discussing all the individual test results, correlating them to the clinical relevance, counseling the patient and/or coordinating care          Again, thank you for allowing me to participate in the care of your patient.        Sincerely,        Trevon Michel MD

## 2021-07-13 NOTE — TELEPHONE ENCOUNTER
[facility] Call Center    Phone Message    May a detailed message be left on voicemail: yes     Reason for Call: Appointment Intake    Referring Provider Name: NA  Diagnosis and/or Symptoms: Pt was in for day 1 yesterday for Patch test and would like to have them removed today. Please call Pt back to see when she can have them removed. Thank you    Action Taken: Message routed to:  [facility] Allergy    Travel Screening: Not Applicable

## 2021-07-15 ENCOUNTER — TELEPHONE (OUTPATIENT)
Dept: DERMATOLOGY | Facility: CLINIC | Age: 66
End: 2021-07-15

## 2021-07-15 NOTE — TELEPHONE ENCOUNTER
[facility] Call Center    Phone Message    May a detailed message be left on voicemail: yes     Reason for Call: Symptoms or Concerns     If patient has red-flag symptoms, warm transfer to triage line    Current symptom or concern: pt stated her upper lip is now swelling, please call Abigail, thank you    Symptoms have been present for: noticed today    Has patient previously been seen for this? No    By : n/a    Date: n/a    Are there any new or worsening symptoms? Yes: new      Action Taken: Message routed to:  [facility] allergy    Travel Screening: Not Applicable

## 2021-07-15 NOTE — PROGRESS NOTES
[facility] Dermato-allergology Note  Office visit  Encounter Date: Jul 16, 2021  ____________________________________________    CC: No chief complaint on file.      HPI:  Ms. Abigail Lam is a(n) 66 year old female who presents today as a return patient for allergy consultation  - Follow-up in Derm-Allergy clinic for 2nd readings and final conclusions after 4 days (in person)   - otherwise feeling well in usual state of health    Physical exam:  General: In no acute distress, well-developed, well-nourished  Eyes: no conjunctivitis  ENT: no signs of rhinitis   Pulmonary: no wheezing or coughing  Skin:Focused examination of the skin on test sites was performed = see test results below    Earlier History and Allergy exams:  Discussed long time with patient the need for the patch tests and that we have to find out if there is a contact allergy. Patient somehow worried about possible long term reactions to the patch tests and explained to her that this is unlikely the case. Did not want to have patch tests on the arms (has wedding of son next week), could not add the last set of patch tests with patients own and 5 last ones of the metal series.     Earlier History and Allergy exams:  - 17th February 2021 2nd Pfizer COVID vaccine and basically photo from March 2nd already has shown some dermatitis on the lower lip with desqamation  - starting in Mid April in [place] in [facility] for recurrent diffuse swelling of the lower lip with slight erythema of the perioral area = got Elidel and Cetirizine  - patient went to the oral surgeon and he gave a fluocinonide Gel = was worsening and irrititating  - had reaction to a Fresh Shampoo  - Kiehls lip balm; Sensodyne tooth paste, Buxom lip gloss  - otherwise feeling well in usual state of health    Past Medical History:   Patient Active Problem List   Diagnosis     Personal history of malignant neoplasm of breast     Acute post-operative pain     No past medical  history on file.    Allergies:  Allergies   Allergen Reactions     Diphtheria Toxoid-Tetanus Tox-Thimerosal      Other reaction(s): Edema     Ketorolac      Other reaction(s): Hypertension     Ketorolac Tromethamine      Other reaction(s): Other (See Comments)  Creates high blood pressure     Toradol        Medications:  Current Outpatient Medications   Medication     atorvastatin (LIPITOR) 10 MG tablet     Ca Carbonate-Mag Hydroxide (ROLAIDS PO)     Cholecalciferol (VITAMIN D) 2000 UNIT tablet     clobetasol (TEMOVATE) 0.05 % ointment     Cyanocobalamin (VITAMIN B-12 IJ)     estradiol (VAGIFEM) 10 MCG TABS     famotidine (PEPCID) 40 MG tablet     fluticasone (FLONASE) 50 MCG/ACT spray     hyoscyamine (ANASPAZ/LEVSIN) 0.125 MG tablet     lidocaine (LIDODERM) 5 % Patch     lorazepam (ATIVAN) 0.5 MG tablet     Lysine 500 MG CAPS     LYSINE PO     Magnesium Citrate 100 MG TABS     Omeprazole-Sodium Bicarbonate  MG CAPS     ondansetron (ZOFRAN-ODT) 4 MG ODT tab     Pseudoephedrine HCl (SUDAFED PO)     Psyllium (FIBER) 0.52 G CAPS     Ranitidine HCl (ZANTAC PO)     sucralfate (CARAFATE) 1 GM/10ML suspension     tacrolimus (PROTOPIC) 0.1 % external ointment     tamoxifen (NOLVADEX) 20 MG tablet     No current facility-administered medications for this visit.       Social History:    Family History:  Family History   Problem Relation Age of Onset     Macular Degeneration Mother      Glaucoma No family hx of        Previous Labs, Allergy Tests, Dermatopathology, Imaging:  See the documents given by the patient from the Dermatology in [facility] and the local allergy specialist    Referred By: No referring provider defined for this encounter.     Allergy Tests:    Past Allergy Test    Order for Future Allergy Testing:    [x] Outpatient  [] Inpatient: Akbar..../ Bed ....       Skin Atopy (atopic dermatitis) [] Yes   [x] No .........  Contact allergies:   [x] Yes   [] No .maybe lip balm  Hand eczema:   [] Yes   [] No            Leading hand:   [] R   [] L       [] Ambidextrous         Drug allergies:        [] Yes   [] No  which?......    Urticaria/Angioedema  [] Yes   [] No .........  Food Allergy:  [] Yes   [] No  which?......  Pets :  [] Yes   [] No  which?......         [x]  Rhinitis   [x] Conjunctivitis   [] Sinusitis   [] Polyposis   [] Otitis   [] Pharyngitis         []  none  Operations:   [] Tonsils   [] Septum   [] Sinus   [] Polyposis        [x] Asthma bronchiale   [] Coughing      []  none  To cats  Symptoms (mostly Rhinoconjunctivitis and Asthma) aggravated by:  Season   [] I   [] II   [] III   [] IV   [x]V   [x]VI   []VII   []VIII   []IX   []X   []XI   []XI     [] perennial   Day time      [] morning   [] noon      [] evening        [] night    [] whole day........  [x]  none  Location/changes    [] inside        [] outside   [] mountains    [] sea     [] others.............   [x]  none  Triggers, specific     [x] animals     [] plants     [] dust              [] others ...........................    []  none  Triggers, others       [] work          [] psyche    [] sport            [] others .............................  [x]  none  Irritant                [] phys efforts [] smoke    [] heat/cold     [] odors  []others............... [x]  none    Order for PATCH TESTS  Reason for tests (suspected allergy): recurrent dermatitis  Known previous allergies: none  Standardized panels  [x] Standard panel (40 tests)  [x] Preservatives & Antimicrobials (31 tests)  [x] Emulsifiers & Additives (25 tests)   [x] Perfumes/Flavours & Plants (25 tests)  [x] Hairdresser panel (12 tests)  [] Rubber Chemicals (22 tests)  [x] Plastics (26 tests)  [] Colorants/Dyes/Food additives (20 tests)  [x] Metals (implants/dental) (24 tests)  [] Local anaesthetics/NSAIDs (13 tests)  [] Antibiotics & Antimycotics (14 tests)   [] Corticosteroids (15 tests)   [] Photopatch test (62 tests)   [] others: ...      [x] Patient's own products: Fluocinonide gel,  Kiehls lip balm, gloss, Sensodyne tooth paste    DO NOT test if chemical or biological identity is unknown!     always ask from patient the product information and safety sheets (MSDS)       Order for PRICK TESTS    Reason for tests (suspected allergy): atopy  Known previous allergies: grass pollens    Standardized prick panels  [x] Atopic panel (20 tests)  [] Pediatric Panel (12 tests)  [] Milk, Meat, Eggs, Grains (20 tests)   [] Dust, Epithelia, Feathers (10 tests)  [] Fish, Seafood, Shellfish (17 tests)  [] Nuts, Beans (14 tests)  [] Spice, Vegetable, Fruit (17 tests)  [] Pollen Panel = Tree, Grass, Weed (24 tests)  [] Others: ...      [] Patient's own products: ...    DO NOT test if chemical or biological identity is unknown!     always ask from patient the product information and safety sheets (MSDS)     Standardized intradermal tests  [x] Penicillium notatum [x] Aspergillus fumigatus [x] House dust mites D.far & D. pteron  [] Cat    [] dog  [] Others: ...  [] Bee venom   [] Wasp venom  !!Specific protocol with dilutions!!     ________________________________    RESULTS & EVALUATION of PATCH TESTS    Patch test readings after     [x] 2 days, [] 3 days [x] 4 days, [] 5 days,    Jul 16, 2021 application of patch tests with results:    STANDARD Series        # Substance 1 day 4 days remarks    1 Herminio Mix [C] - -     2 Colophony - -     3  2-Mercaptobenzothiazole  - -      4 Methylisothiazolinone - -     5 Carba Mix - -     6 Thiuram Mix [A] - -     7 Bisphenol A Epoxy Resin - -     8 B-Reab-Qmxcwjyorpm-Formaldehyde Resin - -     9 Mercapto Mix [A] - -     10 Black Rubber Mix- PPD [B] - -     11 Potassium Dichromate  -  -     12 Balsam of [place] (Myroxylon Pereirae Resin) - -     13 Nickel Sulphate Hexahydrate - +/++     14 Mixed Dialkyl Thiourea - -     15 Paraben Mix [B] - -     16 Methyldibromo Glutaronitrile - -     17 Fragrance Mix (+) -     18 2-Bromo-2-Nitropropane-1,3-Diol (Bronopol) CT NA NA     19 Demetri -  -     20 Tixocortol-21- Pivalate CT - -     21 Diazolidiyl Urea (Germall II) - -     22 Methyl Methacrylate NA NA     23 Cobalt (II) Chloride Hexahydrate - -     24 Fragrance Mix II  - -     25 Compositae Mix - -     26 Benzoyl Peroxide - -     27 Bacitracin - -     28 Formaldehyde - -     29 Methylchloroisothiazolinone / Methylisothiazolinone - -     30 Corticosteroid Mix CT - -     31 Sodium Lauryl Sulfate NA NA     32 Lanolin Alcohol - -     33 Turpentine NA NA     34 Cetylstearylalcohol - -     35 Chlorhexidine Dicluconate - -     36 Budenoside - -     37 Imidazolidinyl Urea  - -     38 Ethyl-2 Cyanoacrylate - -     39 Quaternium 15 (Dowicil 200) - -     40 Decyl Glucoside - -    PRESERVATIVES & ANTIMICROBIALS        # Substance 1 day 4 days remarks   41 1  1,2-Benzisothiazoline-3-One, Sodium Salt - -     2  1,3,5-Conor (2-Hydroxyethyl) - Hexahydrotriazine (Grotan BK) - -     3 9-Ttndmpyitjyhb-1-Nitro-1, 3-Propanediol NA NA     4  3, 4, 4' - Triclocarban - -    45 5 4 - Chloro - 3 - Cresol - -     6 4 - Chloro - 4 - Xylenol (PCMX) - -     7 7-Ethylbicyclooxazolidine (Bioban HP7001) - -     8 Benzalkonium Chloride CT NA NA     9 Benzyl Alcohol - -    50 10 Cetalkonium Chloride NA NA     11 Cetylpyrimidine Chloride  - -     12 Chloroacetamide - -     13 DMDM Hydantoin - -     14 Glutaraldehyde - -    55 15 Triclosan - -     16 Glyoxal Trimeric Dihydrate - -     17 Iodopropynyl Butylcarbamate - -     18 Octylisothiazoline - -     19 Bithionol CT - -    60 20 Bioban P 1487 (Nitrobutyl) Morpholine/(Ethylnitro-Trimethylene) Dimorpholine - -     21 Phenoxyethanol - -     22 Phenyl Salicylate - -     23 Povidone Iodine - -     24 Sodium Benzoate - -    65 25 Sodium Disulfite - -     26 Sorbic Acid - -     27 Thimerosal       28 Melamine Formaldehyde Resin       29 Ethylenediamine Dihydrochloride        Parabens      70 30 Butyl-P-Hydroxybenzoate - -     31 Ethyl-P-Hydroxybenzoate - -     32 Methyl-P-Hydroxybenzoate - -     73 33 Propyl-P-Hydroxybenzoate NA NA    EMULSIFIERS & ADDITIVES       # Substance 1 day 4 days remarks   74 1 Polyethylene Glycol-400 NA NA    75 2 Cocamidopropyl Betaine NA NA     3 Amerchol L101 - -     4 Propylene Glycol - -     5 Triethanolamine - -     6 Sorbitane Sesquiolate CT - -    80 7 Isopropylmyristate NA NA     8 Polysorbate 80 CT - -     9 Amidoamine   (Stearamidopropyl Dimethylamine) - -     10 Oleamidopropyl Dimethylamine - -     11 Lauryl Glucoside - -    85 12 Coconut Diethanolamide  - -     13 2-Hydroxy-4-Methoxy Benzophenone (Oxybenzone) NA NA     14 Benzophenone-4 (Sulisobenzon) - -     15 Propolis - +/++     16 Dexpanthenol - -    90 17 Carboxymethyl Cellulose Sodium NA NA     18 Abitol - -     19 Tert-Butylhydroquinone - -     20 Benzyl Salicylate - -     21 Dimethylaminopropylamin (DMPA) CT? D053 NA NA    95 22 Zinc Pyrithione (Zinc Omadine) CT? Z006 NA NA     23 Conor(Hydroxymethyl) Nitromethane CT        Antioxidant - -     24 Dodecyl Gallate - -     25 Butylhydroxyanisole (BHA) - -     26 Butylhydroxytoluene (BHT) - -    100 27 Di-Alpha-Tocopherol (Vit E) - -    101 28 Propyl Gallate - -    PERFUMES, FLAVORS & PLANTS        # Substance 1 day 4 days remarks   102 1 Benzyl Cinnamate - -     2 Di-Limonene (Dipentene) - -     3 Cananga Odorata (Hipolito Mcmillan) (I) - -    105 4 Lichen Acid Mix - -     5 Mentha Piperita Oil (Peppermint Oil) - -     6 Sesquiterpenelactone mix - -     7 Tea Tree Oil, Oxidized - -     8 Wood Tar Mix (+) (+)    110 9 Abietic Acid - -     10 Lavendula Angustifolia Oil (Lavender Oil) - -     11 Fragrance mix II CT * - -      Fragrance Mix I       12 Oakmoss Absolute - -     13 Eugenol - -    115 14 Geraniol - -     15 Hydroxycitronellal - -     16 Isoeugenol - -     17 Cinnamic Aldehyde - -     18 Cinnamic Alcohol  - -      Fragrance mix II      120 19 Citronellol - -     20 Alpha-Hexylcinnamic Aldehyde    - -     21 Citral - -     22 Farnesol - -    124 23 Coumarin - -       Hexylcinnamic aldehyde, Coumarin, Farnesol, Hydroxyisohexy3-cyclohexene carboxaldehyde, citral, citrolellol  HAIRDRESSER        # Substance 1 day 4 days remarks   125 1 P-Phenylenediamin  -  -     2 P-Toluenediamine Sulphate  -  -     3 Ammonium Thioglycolate - -     4 Ammonium Persulfate - -     5 Resorcinol - -    130 6 3-Aminophenol - -     7 P-Aminophenol - -     8 Glyceryl Monothioglycolate - -     9 Pyrogallol - -    134 10 P-Aminodiphenylamine - -    PLASTICS        # Substance 1 day 4 days remarks     Acrylates - -    135 1 2-Hydroxyethyl Methacrylate (HEMA) - -     2 1,4-Butandioldimethacrylate (BUDMA) - -     3  2-Ethylhexyl Acrylate - -     4 Bisphenol-A-Dimethacrylate  - -     5 Diurethane-Dimethacrylate NA NA    140 6 Ethyleneglycoldimethacrylate (EGDMA) - -     7 Pentaerythritoltriacrylate (NERISSA) - -     8 Triethylene Glycol Dimethacrylate (TEGDMA) - -      Synthetic material/additives        9 A-Febx-Jhmqhrsgfhg - -     10 Tricresyl Phosphate - -    145 11 5-Pgnk-Dmucycmmhzqdw - -     12 Bis (2-Ethylhexyl) Phthalate - -     13 Dibutylphthalate - -     14 Dimethylphthalate - -     15 Toluene-2,4-Diisocyanate NA NA    150 16 Diphenylmethane-4,4''-Diisocyanate - -      EPOXY RESIN SYSTEMS        Reactive Solvents - -     17 Cresyl Glycidyl Ether - -     18 Butyl Glycidyl Ether - -     19 Phenyl Glycidyl Ether - -     20 1,4-Butanediol Diglycidyl Ether - -    155 21 1,6-Hexanediole Diglycidyl Ether - -      Hardener / Accelerator - -     22 Triethylenetetramine - -     23 Diethylenetriamine - -     24 Isophorone Diamine (IPD) - -     25 N,N-Dimethyl-P-Toluidine - -    160 26 6-vynz-Cmrcfdhvjgomn (antioxidant/stabilizer) CT - -    161 27 6-ukbt-Kdscsrvfejyfrmjewbxgizm resin PTBP CT  - -    METALS (Implants / Dental)        # Substance 1 day 4 days remarks   162 1 Ammonium Heptamolybdate (IV) - -     2 Ammonium Tetrachloroplatinate - -     3 Indium (III) Chloride - -    165 4 Iridium (III) Chloride - -      5 Ferric Chloride - -     6 Manganese (II) Chloride - -     7 Niobium (V) Chloride - -     8 Palladium Chloride - -    170 9 Silver Nitrate - -     10 Gold Sodium Thiosulfate - -     11 Tantal - -     12 Tin (II) Chloride - -     13 Titanium (IV) Oxide - -    175 14 Titanium - -     15 Tungstic Acid, Sod Salt Dihydrat - -     16 Vanadium Pentoxide - -     17 Wolfram - -     18 Zinc Chloride - -    180 19 Zirconium (IV) Oxide - -    x 20 Ammoniated Murcury - -    x 21 Copper Sulfate Pentahydrate - -    x 22 Amalgam  - -    x 23 Aluminum Hydroxide - -    x 24 Platinum Tetrachloride - -      OTHER PRODUCTS        No Substance Conc  % solv 2 days 4 days remarks   x 1 Fluocinonide 0.05% gel As is       x 2 Kiehls lip balm As is       x 3 Sensodye tooth paste 50 vas      x 4 Colbert salve As is       x 5 Buxom lip gloss As is        6          7          8          9          10           Results of patch tests:                         Interpretation:  - Negative                    A    = Allergic      (+) Erythema    TI   = Toxic/irritant   + E + Infiltration    RaP = Relevance at Present     ++ E/I + Papulovesicle   Rpr  = Relevance Previously     +++ E/I/P + Blister     nR   = No Relevance      Atopy Screen (Placed xx/xx/21)    No Substance Readings (15 min) Evaluation   POS Histamine 1mg/ml -    NEG NaCl 0.9% -      No Substance Readings (15 min) Evaluation   1 Alternaria alternata (tenuis)  -    2 Cladosporium herbarum -    3 Aspergillus fumigatus -    4 Penicillium notatum -    5 Dermatophagoides pteronyssinus -    6 Dermatophagoides farinae -    7 Dog epithelium (canis spp) -    8 Cat hair (immanuel catus) -    9 Cockroach   (Blatella americana & germanica) -    10 Grass mix midwest   (Nohelia, Orchard, Redtop, Kana) -    11 Flavio grass (sorghum halepense) -    12 Weed mix   (common Cocklebur, Lamb s quarters, rough redroot Pigweed, Dock/Sorrel) -    13 Mug wort (artemisia vulgare) -    14 Ragweed giant/short  (ambrosia spp) -    15 White birch (Betula papyrifera) -    16 Tree mix 1 (Pecan, Maple BHR, Oak RVW, american Pierson, black Cutler) -    17 Red cedar (juniperus virginia) -    18 Tree mix 2   (white Jonnathan, river/red Birch, black [place], common [place], american Elm) -    19 Box elder/Maple mix (acer spp) -    20 [place]au shagbark (carya ovata) -           Conclusion:       Intradermal Testing (Placed xx/xx/21)    No Substance Conc.  Reading (15min)  immediate Papule [mm] / Erythema [mm] Reading   (... days)  delayed Papule [mm] / Erythema [mm] Remarks   - NaCl  0.9% -   -    + Histamine (prick) 0.1mg/ml -   -    DF Standard Dust Mite - D. Farinae 1:10 -   -    DP Standard Dust Mite - D. Pteronyssinus 1:10 -   -    A Aspergillus fumigatus  1:10 -   -    P Penicillium notatum 1:10 -   -      1:10 -   -      1:10 -   -    Conclusions:     [x] Allergic reaction diagnosed against following allergens: Nickel and Propolis      Interpretation/ remarks:   Patient is certainly atopic with sensitization to grass and cat pollens and Nickel and in patch tests. In patch tests also reaction to Propolis and slightly to wood tar mix    [x] Patient information given   [x] ACDS CAMP information's (# [id number], and [id number]) to   following compounds: Nickel, Propolis, Wood tar   [] General information's to following compounds: ......    Assessment & Plan:    ==> Final Diagnosis:     # chronic swelling and eczematous reaction on lower lip     mostl likely atopic dermatitis    contact sensitization to Nickel and Propolis  * chronic with acute exacerbation      # Atopic predisposition with:    Seasonal RC in summer to grass pollens    RCA and contact Urticaria to cats/horses    atopic dermatitis  * chronic with acute exacerbation      # long term local reaction to Bee and Wasp Venom  * chronic, active    These conclusions are made at the best of one's knowledge and belief based on the provided evidence such as patient's history and  allergy test results and they can change over time or can be incomplete because of missing information's.    ==> Treatment Plan:  >> try Protopic oint 0.1% on lips from Monday to Friday and Saturday/Sunday topical steroid Mometasone  >> try pure vaseline and stop everything else on lips  >> avoid products containing Nickel and Propolis   >> follow the recommendations of the CAMP Raymundo    >> no contraindications for implants as long as there is no trace of Nickel      Staff: : provider    Follow-up in Derm-Allergy clinic in about 5 weeks  ___________________________    I spent a total of 36 minutes with Abigail Lam during today s  visit. This time was spent discussing all the individual test results, correlating them to the clinical relevance, counseling the patient and/or coordinating care

## 2021-07-16 ENCOUNTER — OFFICE VISIT (OUTPATIENT)
Dept: ALLERGY | Facility: CLINIC | Age: 66
End: 2021-07-16
Payer: MEDICARE

## 2021-07-16 DIAGNOSIS — J45.20 MILD INTERMITTENT ASTHMA WITHOUT COMPLICATION: ICD-10-CM

## 2021-07-16 DIAGNOSIS — J30.2 SEASONAL AND PERENNIAL ALLERGIC RHINOCONJUNCTIVITIS: ICD-10-CM

## 2021-07-16 DIAGNOSIS — L23.89 ALLERGIC CONTACT DERMATITIS DUE TO OTHER AGENTS: ICD-10-CM

## 2021-07-16 DIAGNOSIS — L20.89 OTHER ATOPIC DERMATITIS: Primary | ICD-10-CM

## 2021-07-16 DIAGNOSIS — H10.10 SEASONAL AND PERENNIAL ALLERGIC RHINOCONJUNCTIVITIS: ICD-10-CM

## 2021-07-16 DIAGNOSIS — J30.89 SEASONAL AND PERENNIAL ALLERGIC RHINOCONJUNCTIVITIS: ICD-10-CM

## 2021-07-16 PROCEDURE — 99214 OFFICE O/P EST MOD 30 MIN: CPT | Performed by: DERMATOLOGY

## 2021-07-16 NOTE — NURSING NOTE
Chief Complaint   Patient presents with     Allergy Testing Followup     Patch testing day 5      Caitlin Palomino RN

## 2021-07-16 NOTE — PATIENT INSTRUCTIONS
Interpretation/ remarks:   Patient is certainly atopic with sensitization to grass and cat pollens and Nickel and in patch tests. In patch tests also reaction to Propolis and slightly to wood tar mix    [x] Patient information given   [x] ACDS CAMP information's (# [id number], and [id number]) to   following compounds: Nickel, Propolis, Wood tar

## 2021-07-16 NOTE — LETTER
7/16/2021         RE: Abigail Lam  [address]  [facility] [address]        Dear Colleague,    Thank you for referring your patient, Abigail Lam, to the [facility] [place]. Please see a copy of my visit note below.    [facility] Dermato-allergology Note  Office visit  Encounter Date: Jul 16, 2021  ____________________________________________    CC: No chief complaint on file.      HPI:  Ms. Abigail Lam is a(n) 66 year old female who presents today as a return patient for allergy consultation  - Follow-up in Derm-Allergy clinic for 2nd readings and final conclusions after 4 days (in person)   - otherwise feeling well in usual state of health    Physical exam:  General: In no acute distress, well-developed, well-nourished  Eyes: no conjunctivitis  ENT: no signs of rhinitis   Pulmonary: no wheezing or coughing  Skin:Focused examination of the skin on test sites was performed = see test results below    Earlier History and Allergy exams:  Discussed long time with patient the need for the patch tests and that we have to find out if there is a contact allergy. Patient somehow worried about possible long term reactions to the patch tests and explained to her that this is unlikely the case. Did not want to have patch tests on the arms (has wedding of son next week), could not add the last set of patch tests with patients own and 5 last ones of the metal series.     Earlier History and Allergy exams:  - 17th February 2021 2nd Pfizer COVID vaccine and basically photo from March 2nd already has shown some dermatitis on the lower lip with desqamation  - starting in Mid April in [place] in [facility] for recurrent diffuse swelling of the lower lip with slight erythema of the perioral area = got Elidel and Cetirizine  - patient went to the oral surgeon and he gave a fluocinonide Gel = was worsening and irrititating  - had reaction to a Fresh Shampoo  - Kiehls lip balm;  Sensodyne tooth paste, Buxom lip gloss  - otherwise feeling well in usual state of health    Past Medical History:   Patient Active Problem List   Diagnosis     Personal history of malignant neoplasm of breast     Acute post-operative pain     No past medical history on file.    Allergies:  Allergies   Allergen Reactions     Diphtheria Toxoid-Tetanus Tox-Thimerosal      Other reaction(s): Edema     Ketorolac      Other reaction(s): Hypertension     Ketorolac Tromethamine      Other reaction(s): Other (See Comments)  Creates high blood pressure     Toradol        Medications:  Current Outpatient Medications   Medication     atorvastatin (LIPITOR) 10 MG tablet     Ca Carbonate-Mag Hydroxide (ROLAIDS PO)     Cholecalciferol (VITAMIN D) 2000 UNIT tablet     clobetasol (TEMOVATE) 0.05 % ointment     Cyanocobalamin (VITAMIN B-12 IJ)     estradiol (VAGIFEM) 10 MCG TABS     famotidine (PEPCID) 40 MG tablet     fluticasone (FLONASE) 50 MCG/ACT spray     hyoscyamine (ANASPAZ/LEVSIN) 0.125 MG tablet     lidocaine (LIDODERM) 5 % Patch     lorazepam (ATIVAN) 0.5 MG tablet     Lysine 500 MG CAPS     LYSINE PO     Magnesium Citrate 100 MG TABS     Omeprazole-Sodium Bicarbonate  MG CAPS     ondansetron (ZOFRAN-ODT) 4 MG ODT tab     Pseudoephedrine HCl (SUDAFED PO)     Psyllium (FIBER) 0.52 G CAPS     Ranitidine HCl (ZANTAC PO)     sucralfate (CARAFATE) 1 GM/10ML suspension     tacrolimus (PROTOPIC) 0.1 % external ointment     tamoxifen (NOLVADEX) 20 MG tablet     No current facility-administered medications for this visit.       Social History:    Family History:  Family History   Problem Relation Age of Onset     Macular Degeneration Mother      Glaucoma No family hx of        Previous Labs, Allergy Tests, Dermatopathology, Imaging:  See the documents given by the patient from the Dermatology in [facility] and the local allergy specialist    Referred By: No referring provider defined for this encounter.     Allergy Tests:    Past  Allergy Test    Order for Future Allergy Testing:    [x] Outpatient  [] Inpatient: Akbar..../ Bed ....       Skin Atopy (atopic dermatitis) [] Yes   [x] No .........  Contact allergies:   [x] Yes   [] No .maybe lip balm  Hand eczema:   [] Yes   [] No           Leading hand:   [] R   [] L       [] Ambidextrous         Drug allergies:        [] Yes   [] No  which?......    Urticaria/Angioedema  [] Yes   [] No .........  Food Allergy:  [] Yes   [] No  which?......  Pets :  [] Yes   [] No  which?......         [x]  Rhinitis   [x] Conjunctivitis   [] Sinusitis   [] Polyposis   [] Otitis   [] Pharyngitis         []  none  Operations:   [] Tonsils   [] Septum   [] Sinus   [] Polyposis        [x] Asthma bronchiale   [] Coughing      []  none  To cats  Symptoms (mostly Rhinoconjunctivitis and Asthma) aggravated by:  Season   [] I   [] II   [] III   [] IV   [x]V   [x]VI   []VII   []VIII   []IX   []X   []XI   []XI     [] perennial   Day time      [] morning   [] noon      [] evening        [] night    [] whole day........  [x]  none  Location/changes    [] inside        [] outside   [] mountains    [] sea     [] others.............   [x]  none  Triggers, specific     [x] animals     [] plants     [] dust              [] others ...........................    []  none  Triggers, others       [] work          [] psyche    [] sport            [] others .............................  [x]  none  Irritant                [] phys efforts [] smoke    [] heat/cold     [] odors  []others............... [x]  none    Order for PATCH TESTS  Reason for tests (suspected allergy): recurrent dermatitis  Known previous allergies: none  Standardized panels  [x] Standard panel (40 tests)  [x] Preservatives & Antimicrobials (31 tests)  [x] Emulsifiers & Additives (25 tests)   [x] Perfumes/Flavours & Plants (25 tests)  [x] Hairdresser panel (12 tests)  [] Rubber Chemicals (22 tests)  [x] Plastics (26 tests)  [] Colorants/Dyes/Food additives (20  tests)  [x] Metals (implants/dental) (24 tests)  [] Local anaesthetics/NSAIDs (13 tests)  [] Antibiotics & Antimycotics (14 tests)   [] Corticosteroids (15 tests)   [] Photopatch test (62 tests)   [] others: ...      [x] Patient's own products: Fluocinonide gel, Kiehls lip balm, gloss, Sensodyne tooth paste    DO NOT test if chemical or biological identity is unknown!     always ask from patient the product information and safety sheets (MSDS)       Order for PRICK TESTS    Reason for tests (suspected allergy): atopy  Known previous allergies: grass pollens    Standardized prick panels  [x] Atopic panel (20 tests)  [] Pediatric Panel (12 tests)  [] Milk, Meat, Eggs, Grains (20 tests)   [] Dust, Epithelia, Feathers (10 tests)  [] Fish, Seafood, Shellfish (17 tests)  [] Nuts, Beans (14 tests)  [] Spice, Vegetable, Fruit (17 tests)  [] Pollen Panel = Tree, Grass, Weed (24 tests)  [] Others: ...      [] Patient's own products: ...    DO NOT test if chemical or biological identity is unknown!     always ask from patient the product information and safety sheets (MSDS)     Standardized intradermal tests  [x] Penicillium notatum [x] Aspergillus fumigatus [x] House dust mites D.far & D. pteron  [] Cat    [] dog  [] Others: ...  [] Bee venom   [] Wasp venom  !!Specific protocol with dilutions!!     ________________________________    RESULTS & EVALUATION of PATCH TESTS    Patch test readings after     [x] 2 days, [] 3 days [x] 4 days, [] 5 days,    Jul 16, 2021 application of patch tests with results:    STANDARD Series        # Substance 1 day 4 days remarks    1 Herminio Mix [C] - -     2 Colophony - -     3  2-Mercaptobenzothiazole  - -      4 Methylisothiazolinone - -     5 Carba Mix - -     6 Thiuram Mix [A] - -     7 Bisphenol A Epoxy Resin - -     8 W-Zdjj-Qfloaixtdye-Formaldehyde Resin - -     9 Mercapto Mix [A] - -     10 Black Rubber Mix- PPD [B] - -     11 Potassium Dichromate  -  -     12 Balsa of [place] (Myroxylon  Pereirae Resin) - -     13 Nickel Sulphate Hexahydrate - +/++     14 Mixed Dialkyl Thiourea - -     15 Paraben Mix [B] - -     16 Methyldibromo Glutaronitrile - -     17 Fragrance Mix (+) -     18 2-Bromo-2-Nitropropane-1,3-Diol (Bronopol) CT NA NA     19 Lyral - -     20 Tixocortol-21- Pivalate CT - -     21 Diazolidiyl Urea (Germall II) - -     22 Methyl Methacrylate NA NA     23 Cobalt (II) Chloride Hexahydrate - -     24 Fragrance Mix II  - -     25 Compositae Mix - -     26 Benzoyl Peroxide - -     27 Bacitracin - -     28 Formaldehyde - -     29 Methylchloroisothiazolinone / Methylisothiazolinone - -     30 Corticosteroid Mix CT - -     31 Sodium Lauryl Sulfate NA NA     32 Lanolin Alcohol - -     33 Turpentine NA NA     34 Cetylstearylalcohol - -     35 Chlorhexidine Dicluconate - -     36 Budenoside - -     37 Imidazolidinyl Urea  - -     38 Ethyl-2 Cyanoacrylate - -     39 Quaternium 15 (Dowicil 200) - -     40 Decyl Glucoside - -    PRESERVATIVES & ANTIMICROBIALS        # Substance 1 day 4 days remarks   41 1  1,2-Benzisothiazoline-3-One, Sodium Salt - -     2  1,3,5-Conro (2-Hydroxyethyl) - Hexahydrotriazine (Grotan BK) - -     3 7-Plyjyxyqgsaok-9-Nitro-1, 3-Propanediol NA NA     4  3, 4, 4' - Triclocarban - -    45 5 4 - Chloro - 3 - Cresol - -     6 4 - Chloro - 4 - Xylenol (PCMX) - -     7 7-Ethylbicyclooxazolidine (Bioban KA8349) - -     8 Benzalkonium Chloride CT NA NA     9 Benzyl Alcohol - -    50 10 Cetalkonium Chloride NA NA     11 Cetylpyrimidine Chloride  - -     12 Chloroacetamide - -     13 DMDM Hydantoin - -     14 Glutaraldehyde - -    55 15 Triclosan - -     16 Glyoxal Trimeric Dihydrate - -     17 Iodopropynyl Butylcarbamate - -     18 Octylisothiazoline - -     19 Bithionol CT - -    60 20 Bioban P 1487 (Nitrobutyl) Morpholine/(Ethylnitro-Trimethylene) Dimorpholine - -     21 Phenoxyethanol - -     22 Phenyl Salicylate - -     23 Povidone Iodine - -     24 Sodium Benzoate - -    65 25  Sodium Disulfite - -     26 Sorbic Acid - -     27 Thimerosal       28 Melamine Formaldehyde Resin       29 Ethylenediamine Dihydrochloride        Parabens      70 30 Butyl-P-Hydroxybenzoate - -     31 Ethyl-P-Hydroxybenzoate - -     32 Methyl-P-Hydroxybenzoate - -    73 33 Propyl-P-Hydroxybenzoate NA NA    EMULSIFIERS & ADDITIVES       # Substance 1 day 4 days remarks   74 1 Polyethylene Glycol-400 NA NA    75 2 Cocamidopropyl Betaine NA NA     3 Amerchol L101 - -     4 Propylene Glycol - -     5 Triethanolamine - -     6 Sorbitane Sesquiolate CT - -    80 7 Isopropylmyristate NA NA     8 Polysorbate 80 CT - -     9 Amidoamine   (Stearamidopropyl Dimethylamine) - -     10 Oleamidopropyl Dimethylamine - -     11 Lauryl Glucoside - -    85 12 Coconut Diethanolamide  - -     13 2-Hydroxy-4-Methoxy Benzophenone (Oxybenzone) NA NA     14 Benzophenone-4 (Sulisobenzon) - -     15 Propolis - +/++     16 Dexpanthenol - -    90 17 Carboxymethyl Cellulose Sodium NA NA     18 Abitol - -     19 Tert-Butylhydroquinone - -     20 Benzyl Salicylate - -     21 Dimethylaminopropylamin (DMPA) CT? D053 NA NA    95 22 Zinc Pyrithione (Zinc Omadine) CT? Z006 NA NA     23 Conor(Hydroxymethyl) Nitromethane CT        Antioxidant - -     24 Dodecyl Gallate - -     25 Butylhydroxyanisole (BHA) - -     26 Butylhydroxytoluene (BHT) - -    100 27 Di-Alpha-Tocopherol (Vit E) - -    101 28 Propyl Gallate - -    PERFUMES, FLAVORS & PLANTS        # Substance 1 day 4 days remarks   102 1 Benzyl Cinnamate - -     2 Di-Limonene (Dipentene) - -     3 Cananga Odorata (Hipolito Mcmillan) (I) - -    105 4 Lichen Acid Mix - -     5 Mentha Piperita Oil (Peppermint Oil) - -     6 Sesquiterpenelactone mix - -     7 Tea Tree Oil, Oxidized - -     8 Wood Tar Mix (+) (+)    110 9 Abietic Acid - -     10 Lavendula Angustifolia Oil (Lavender Oil) - -     11 Fragrance mix II CT * - -      Fragrance Mix I       12 Oakmoss Absolute - -     13 Eugenol - -    115 14 Geraniol  - -     15 Hydroxycitronellal - -     16 Isoeugenol - -     17 Cinnamic Aldehyde - -     18 Cinnamic Alcohol  - -      Fragrance mix II      120 19 Citronellol - -     20 Alpha-Hexylcinnamic Aldehyde    - -     21 Citral - -     22 Farnesol - -    124 23 Coumarin - -      Hexylcinnamic aldehyde, Coumarin, Farnesol, Hydroxyisohexy3-cyclohexene carboxaldehyde, citral, citrolellol  HAIRDRESSER        # Substance 1 day 4 days remarks   125 1 P-Phenylenediamin  -  -     2 P-Toluenediamine Sulphate  -  -     3 Ammonium Thioglycolate - -     4 Ammonium Persulfate - -     5 Resorcinol - -    130 6 3-Aminophenol - -     7 P-Aminophenol - -     8 Glyceryl Monothioglycolate - -     9 Pyrogallol - -    134 10 P-Aminodiphenylamine - -    PLASTICS        # Substance 1 day 4 days remarks     Acrylates - -    135 1 2-Hydroxyethyl Methacrylate (HEMA) - -     2 1,4-Butandioldimethacrylate (BUDMA) - -     3  2-Ethylhexyl Acrylate - -     4 Bisphenol-A-Dimethacrylate  - -     5 Diurethane-Dimethacrylate NA NA    140 6 Ethyleneglycoldimethacrylate (EGDMA) - -     7 Pentaerythritoltriacrylate (NERISSA) - -     8 Triethylene Glycol Dimethacrylate (TEGDMA) - -      Synthetic material/additives        9 A-Cpvm-Rqrezquyddl - -     10 Tricresyl Phosphate - -    145 11 7-Wktn-Voznvsuztsiox - -     12 Bis (2-Ethylhexyl) Phthalate - -     13 Dibutylphthalate - -     14 Dimethylphthalate - -     15 Toluene-2,4-Diisocyanate NA NA    150 16 Diphenylmethane-4,4''-Diisocyanate - -      EPOXY RESIN SYSTEMS        Reactive Solvents - -     17 Cresyl Glycidyl Ether - -     18 Butyl Glycidyl Ether - -     19 Phenyl Glycidyl Ether - -     20 1,4-Butanediol Diglycidyl Ether - -    155 21 1,6-Hexanediole Diglycidyl Ether - -      Hardener / Accelerator - -     22 Triethylenetetramine - -     23 Diethylenetriamine - -     24 Isophorone Diamine (IPD) - -     25 N,N-Dimethyl-P-Toluidine - -    160 26 4-xhhs-Pczwfjstxtqgu (antioxidant/stabilizer) CT - -    161 09  4-clzn-Iiuwzcieocxjqudksxfpxyw resin PTBP CT  - -    METALS (Implants / Dental)        # Substance 1 day 4 days remarks   162 1 Ammonium Heptamolybdate (IV) - -     2 Ammonium Tetrachloroplatinate - -     3 Indium (III) Chloride - -    165 4 Iridium (III) Chloride - -     5 Ferric Chloride - -     6 Manganese (II) Chloride - -     7 Niobium (V) Chloride - -     8 Palladium Chloride - -    170 9 Silver Nitrate - -     10 Gold Sodium Thiosulfate - -     11 Tantal - -     12 Tin (II) Chloride - -     13 Titanium (IV) Oxide - -    175 14 Titanium - -     15 Tungstic Acid, Sod Salt Dihydrat - -     16 Vanadium Pentoxide - -     17 Wolfram - -     18 Zinc Chloride - -    180 19 Zirconium (IV) Oxide - -    x 20 Ammoniated Murcury - -    x 21 Copper Sulfate Pentahydrate - -    x 22 Amalgam  - -    x 23 Aluminum Hydroxide - -    x 24 Platinum Tetrachloride - -      OTHER PRODUCTS        No Substance Conc  % solv 2 days 4 days remarks   x 1 Fluocinonide 0.05% gel As is       x 2 Kiehls lip balm As is       x 3 Sensodye tooth paste 50 vas      x 4 Will salve As is       x 5 Buxom lip gloss As is        6          7          8          9          10           Results of patch tests:                         Interpretation:  - Negative                    A    = Allergic      (+) Erythema    TI   = Toxic/irritant   + E + Infiltration    RaP = Relevance at Present     ++ E/I + Papulovesicle   Rpr  = Relevance Previously     +++ E/I/P + Blister     nR   = No Relevance      Atopy Screen (Placed xx/xx/21)    No Substance Readings (15 min) Evaluation   POS Histamine 1mg/ml -    NEG NaCl 0.9% -      No Substance Readings (15 min) Evaluation   1 Alternaria alternata (tenuis)  -    2 Cladosporium herbarum -    3 Aspergillus fumigatus -    4 Penicillium notatum -    5 Dermatophagoides pteronyssinus -    6 Dermatophagoides farinae -    7 Dog epithelium (canis spp) -    8 Cat hair (immanuel catus) -    9 Cockroach   (Blatella americana &  germanica) -    10 Grass mix midwest   (Nohelia, Orchard, Redtop, Kana) -    11 Flavio grass (sorghum halepense) -    12 Weed mix   (common Cocklebur, Lamb s quarters, rough redroot Pigweed, Dock/Sorrel) -    13 Mug wort (artemisia vulgare) -    14 Ragweed giant/short (ambrosia spp) -    15 White birch (Betula papyrifera) -    16 Tree mix 1 (Pecan, Maple BHR, Oak RVW, american Diablo, black [place]) -    17 Red cedar (juniperus virginia) -    18 Tree mix 2   (white Jonnathan, river/red Birch, black Lyons, common Adona, american Elm) -    19 Box elder/Maple mix (acer spp) -    20 [place] shagbark (carya ovata) -           Conclusion:       Intradermal Testing (Placed xx/xx/21)    No Substance Conc.  Reading (15min)  immediate Papule [mm] / Erythema [mm] Reading   (... days)  delayed Papule [mm] / Erythema [mm] Remarks   - NaCl  0.9% -   -    + Histamine (prick) 0.1mg/ml -   -    DF Standard Dust Mite - D. Farinae 1:10 -   -    DP Standard Dust Mite - D. Pteronyssinus 1:10 -   -    A Aspergillus fumigatus  1:10 -   -    P Penicillium notatum 1:10 -   -      1:10 -   -      1:10 -   -    Conclusions:     [x] Allergic reaction diagnosed against following allergens: Nickel and Propolis      Interpretation/ remarks:   Patient is certainly atopic with sensitization to grass and cat pollens and Nickel and in patch tests. In patch tests also reaction to Propolis and slightly to wood tar mix    [x] Patient information given   [x] ACDS CAMP information's (# [id number], and [id number]) to   following compounds: Nickel, Propolis, Wood tar   [] General information's to following compounds: ......    Assessment & Plan:    ==> Final Diagnosis:     # chronic swelling and eczematous reaction on lower lip     mostl likely atopic dermatitis    contact sensitization to Nickel and Propolis  * chronic with acute exacerbation      # Atopic predisposition with:    Seasonal RC in summer to grass pollens    RCA and contact Urticaria to  cats/horses    atopic dermatitis  * chronic with acute exacerbation      # 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    ==> Treatment Plan:  >> try Protopic oint 0.1% on lips from Monday to Friday and Saturday/Sunday topical steroid Mometasone  >> try pure vaseline and stop everything else on lips  >> avoid products containing Nickel and Propolis   >> follow the recommendations of the CAMP Raymundo    >> no contraindications for implants as long as there is no trace of Nickel      Staff: : provider    Follow-up in Derm-Allergy clinic in about 5 weeks  ___________________________    I spent a total of 36 minutes with Abigail Lam during today s  visit. This time was spent discussing all the individual test results, correlating them to the clinical relevance, counseling the patient and/or coordinating care       Again, thank you for allowing me to participate in the care of your patient.        Sincerely,        Trevon Michel MD

## 2021-07-26 DIAGNOSIS — Z85.3 PERSONAL HISTORY OF MALIGNANT NEOPLASM OF BREAST: ICD-10-CM

## 2021-07-27 NOTE — TELEPHONE ENCOUNTER
Tamoxifen     Last prescribing provider: Dr Matthew     Last clinic visit date: 6/25/21 Dr matthew    Any missed appointments or no-shows since last clinic visit?: No    Recommendations for requested medication (if none, N/A): Copied from chart note Dr Matthew 6/25/21  She was treated with bilateral mastectomy, oophorectomy, started Tamoxifen in 2006 and took it for 5 years. She has now been on extended endocrine therapy with Arimidex since June 2011. She had been receiving q 6 month zolendronate but this was stopped Dec 2013 as it might have been causing her rib musculskeletal symptoms. She switched to tamoxifen in June 2017.       Next clinic visit date: 12/17/21 Dr Matthew    Any other pertinent information (if none, N/A): N/A

## 2021-07-28 RX ORDER — TAMOXIFEN CITRATE 20 MG/1
TABLET ORAL
Qty: 90 TABLET | Refills: 3 | Status: SHIPPED | OUTPATIENT
Start: 2021-07-28 | End: 2022-06-10

## 2021-08-02 DIAGNOSIS — Z85.3 PERSONAL HISTORY OF MALIGNANT NEOPLASM OF BREAST: ICD-10-CM

## 2021-08-02 NOTE — TELEPHONE ENCOUNTER
Atorvastatin refill  Last prescribing provider: Dr Matthew    Last clinic visit date: 6/25/21 Dr Matthew     Any missed appointments or no-shows since last clinic visit?: No     Recommendations for requested medication (if none, N/A): Not mentioned in chart note on 6/25/21 Dr Matthew     Next clinic visit date: 12/17/21 Dr Matthew     Any other pertinent information (if none, N/A): N/A

## 2021-08-03 RX ORDER — ATORVASTATIN CALCIUM 10 MG/1
TABLET, FILM COATED ORAL
Qty: 90 TABLET | Refills: 3 | Status: SHIPPED | OUTPATIENT
Start: 2021-08-03

## 2021-09-04 ENCOUNTER — HEALTH MAINTENANCE LETTER (OUTPATIENT)
Age: 66
End: 2021-09-04

## 2021-09-27 ENCOUNTER — MYC MEDICAL ADVICE (OUTPATIENT)
Dept: ALLERGY | Facility: CLINIC | Age: 66
End: 2021-09-27

## 2021-09-27 DIAGNOSIS — J30.2 SEASONAL AND PERENNIAL ALLERGIC RHINOCONJUNCTIVITIS: Primary | ICD-10-CM

## 2021-09-27 DIAGNOSIS — H10.10 SEASONAL AND PERENNIAL ALLERGIC RHINOCONJUNCTIVITIS: Primary | ICD-10-CM

## 2021-09-27 DIAGNOSIS — J30.89 SEASONAL AND PERENNIAL ALLERGIC RHINOCONJUNCTIVITIS: Primary | ICD-10-CM

## 2021-09-29 RX ORDER — FEXOFENADINE HCL 180 MG/1
180 TABLET ORAL DAILY
Qty: 10 TABLET | Refills: 0 | Status: SHIPPED | OUTPATIENT
Start: 2021-09-29 | End: 2021-10-09

## 2021-09-29 RX ORDER — HYDROXYZINE HYDROCHLORIDE 25 MG/1
25 TABLET, FILM COATED ORAL AT BEDTIME
Qty: 10 TABLET | Refills: 0 | Status: SHIPPED | OUTPATIENT
Start: 2021-09-29 | End: 2021-10-09

## 2021-12-17 ENCOUNTER — ONCOLOGY VISIT (OUTPATIENT)
Dept: ONCOLOGY | Facility: CLINIC | Age: 66
End: 2021-12-17
Attending: INTERNAL MEDICINE
Payer: MEDICARE

## 2021-12-17 VITALS
HEART RATE: 94 BPM | BODY MASS INDEX: 17.11 KG/M2 | WEIGHT: 93.6 LBS | RESPIRATION RATE: 16 BRPM | TEMPERATURE: 97.5 F | OXYGEN SATURATION: 100 %

## 2021-12-17 DIAGNOSIS — Z85.3 PERSONAL HISTORY OF MALIGNANT NEOPLASM OF BREAST: Primary | ICD-10-CM

## 2021-12-17 PROCEDURE — G0463 HOSPITAL OUTPT CLINIC VISIT: HCPCS

## 2021-12-17 PROCEDURE — 99214 OFFICE O/P EST MOD 30 MIN: CPT | Performed by: INTERNAL MEDICINE

## 2021-12-17 ASSESSMENT — PAIN SCALES - GENERAL: PAINLEVEL: NO PAIN (0)

## 2021-12-17 NOTE — PROGRESS NOTES
DIAGNOSIS:  History of Stage I, node negative, breast cancer.  It was multifocal.  ER, UT positive, grade 1, negative for angiolymphatic invasion. One tumor was 2 cm, another was 0.5.  Had a low Oncotype score of 7.  She was treated with bilateral mastectomy, oophorectomy, started Tamoxifen in 2006 and took it for 5 years.  She has now been on extended endocrine therapy with Arimidex since June 2011.  She had been receiving q 6 month zolendronate but this was stopped Dec 2013 as it might have been causing her rib musculskeletal symptoms.  She switched to tamoxifen in June 2017.      INTERIM HISTORY:  Ms. Lam returns after 6 months.     Since I last saw her she had her implants revised by Dr. Ruiz.  She states that her left implant actually reverse poles and was technically upside down.  She is happy with that result.    She still has a concern about her lower lip swelling.  She has seen multiple physicians including an allergist at the [facility] and still continues to have some swelling.  She states that in general she has become more hypersensitive to multiple contacts.  She describes allergic reactions to dilating eyedrops, local anesthesia, and other medications that she has never been allergic to before.  At this point in time she does not have a diagnosis of what is of concern to her.  We talked a little bit today about mast cell activation syndrome and the difficulty in diagnosing these types of disorders.  She does state that Benadryl has been helpful.  She has had patch testing.  She states that she is allergic to beeswax and nickel.    She has also stopped her tamoxifen.  It was thought that perhaps this could be contributing to some of her allergic symptoms.  We have discussed in the past that she has been on this drug for fairly long period time and I think that she can do well without this.    Otherwise she has been doing reasonably well.  She has been busy.  Her son got  last August.  She  is planning to go to [place] area for Christmas holiday.  She will then go back to [place] after those visits.  She does have concerns about Covid exposure but she states that she is being very careful about her contacts.    Review of systems is otherwise completely unremarkable.  The shortness of breath she had in the past is largely resolved and she does suggest that this could have been anxiety.       PHYSICAL EXAMINATION:   Pulse 94   Temp 97.5  F (36.4  C) (Oral)   Resp 16   Wt 42.5 kg (93 lb 9.6 oz)   SpO2 100%   BMI 17.11 kg/m        GENERAL: She looks well. She is in no distress  HEENT: Her lower lip and sub Q tissue is a little more prominent. It is not red or inflamed. She states that it is a little more sensitive  LUNGS: Clear  IMPLANTS: The left side is well healed and is more symmetrical now  HEART: Normal  ABDOMEN: No HS megaly  NEURO: Intact      PROBLEMS:   1.  History of ER positive breast cancer on extended therapy since about 02/2006. OncotypeDx score = 7  2.  Textured breast implants - recent revision of the left breast implant  3.  Low bone density/Osteoporosis on outside scan   4. H/O GERD better now.  5. Intermittent SOB  6. Lower lip swelling - uncertain etiology        IMPRESSION:  Ms. Lam is mainly troubled by her lip swelling.  As mentioned today she does have findings of some lower lip edema.  She has seen multiple consultants regarding this symptom and it as of yet unresolved.  As mentioned above we talked about mast cell activation syndrome.  When she gets back to [place] she will discuss this with her allergist there.  It is notable that she does get some relief from Benadryl so perhaps this should be further discussed.    From the breast cancer standpoint I think she should stay off the tamoxifen.  She has been on extended course and she has a very low Oncotype score.  We have discussed in the past that there is no evidence of extending therapy beyond  10 years as a benefit and I think she understands that.    PLAN:   1. Stop tamoxifen  2. RTC in 6 months.  3. Contact us sooner as necessary.       Lefty Matthew MD

## 2021-12-17 NOTE — NURSING NOTE
"Oncology Rooming Note    December 17, 2021 11:23 AM   Abigail Lam is a 66 year old female who presents for:    Chief Complaint   Patient presents with     Oncology Clinic Visit     BREAST CANCER     Initial Vitals: Pulse 94   Temp 97.5  F (36.4  C) (Oral)   Resp 16   Wt 42.5 kg (93 lb 9.6 oz)   SpO2 100%   BMI 17.11 kg/m   Estimated body mass index is 17.11 kg/m  as calculated from the following:    Height as of 12/20/19: 1.575 m (5' 2.01\").    Weight as of this encounter: 42.5 kg (93 lb 9.6 oz). Body surface area is 1.36 meters squared.  No Pain (0) Comment: Data Unavailable   No LMP recorded. Patient is postmenopausal.  Allergies reviewed: Yes  Medications reviewed: Yes    Medications: Medication refills not needed today.  Pharmacy name entered into [employer]:    [id number] [place], [place] - [address] [facility] - [place], [place] - [address] [id number] - [place], [place] - [facility] [facility] [address] [facility]    Clinical concerns: None.        Aretha Chiu CMA              "

## 2021-12-17 NOTE — LETTER
12/17/2021         RE: Abigail Lam  [address] [facility] [id number]        Dear Colleague,    Thank you for referring your patient, Abigail Lam, to the [facility]. Please see a copy of my visit note below.    DIAGNOSIS:  History of Stage I, node negative, breast cancer.  It was multifocal.  ER, MA positive, grade 1, negative for angiolymphatic invasion. One tumor was 2 cm, another was 0.5.  Had a low Oncotype score of 7.  She was treated with bilateral mastectomy, oophorectomy, started Tamoxifen in 2006 and took it for 5 years.  She has now been on extended endocrine therapy with Arimidex since June 2011.  She had been receiving q 6 month zolendronate but this was stopped Dec 2013 as it might have been causing her rib musculskeletal symptoms.  She switched to tamoxifen in June 2017.      INTERIM HISTORY:  Ms. Lam returns after 6 months.     Since I last saw her she had her implants revised by Dr. Ruiz.  She states that her left implant actually reverse poles and was technically upside down.  She is happy with that result.    She still has a concern about her lower lip swelling.  She has seen multiple physicians including an allergist at the [facility] and still continues to have some swelling.  She states that in general she has become more hypersensitive to multiple contacts.  She describes allergic reactions to dilating eyedrops, local anesthesia, and other medications that she has never been allergic to before.  At this point in time she does not have a diagnosis of what is of concern to her.  We talked a little bit today about mast cell activation syndrome and the difficulty in diagnosing these types of disorders.  She does state that Benadryl has been helpful.  She has had patch testing.  She states that she is allergic to beeswax and nickel.    She has also stopped her tamoxifen.  It was thought that perhaps this could be contributing to some of her  allergic symptoms.  We have discussed in the past that she has been on this drug for fairly long period time and I think that she can do well without this.    Otherwise she has been doing reasonably well.  She has been busy.  Her son got  last August.  She is planning to go to [place] area for [place] holiday.  She will then go back to [place] after those visits.  She does have concerns about Covid exposure but she states that she is being very careful about her contacts.    Review of systems is otherwise completely unremarkable.  The shortness of breath she had in the past is largely resolved and she does suggest that this could have been anxiety.       PHYSICAL EXAMINATION:   Pulse 94   Temp 97.5  F (36.4  C) (Oral)   Resp 16   Wt 42.5 kg (93 lb 9.6 oz)   SpO2 100%   BMI 17.11 kg/m        GENERAL: She looks well. She is in no distress  HEENT: Her lower lip and sub Q tissue is a little more prominent. It is not red or inflamed. She states that it is a little more sensitive  LUNGS: Clear  IMPLANTS: The left side is well healed and is more symmetrical now  HEART: Normal  ABDOMEN: No HS megaly  NEURO: Intact      PROBLEMS:   1.  History of ER positive breast cancer on extended therapy since about 02/2006. OncotypeDx score = 7  2.  Textured breast implants - recent revision of the left breast implant  3.  Low bone density/Osteoporosis on outside scan   4. H/O GERD better now.  5. Intermittent SOB  6. Lower lip swelling - uncertain etiology        IMPRESSION:  Ms. Lam is mainly troubled by her lip swelling.  As mentioned today she does have findings of some lower lip edema.  She has seen multiple consultants regarding this symptom and it as of yet unresolved.  As mentioned above we talked about mast cell activation syndrome.  When she gets back to [place] she will discuss this with her allergist there.  It is notable that she does get some relief from Benadryl so perhaps this  should be further discussed.    From the breast cancer standpoint I think she should stay off the tamoxifen.  She has been on extended course and she has a very low Oncotype score.  We have discussed in the past that there is no evidence of extending therapy beyond 10 years as a benefit and I think she understands that.    PLAN:   1. Stop tamoxifen  2. RTC in 6 months.  3. Contact us sooner as necessary.       Lefty Matthew MD      Again, thank you for allowing me to participate in the care of your patient.        Sincerely,        Lefty Matthew MD

## 2021-12-31 ENCOUNTER — MEDICAL CORRESPONDENCE (OUTPATIENT)
Dept: HEALTH INFORMATION MANAGEMENT | Facility: CLINIC | Age: 66
End: 2021-12-31
Payer: MEDICARE

## 2022-02-19 ENCOUNTER — HEALTH MAINTENANCE LETTER (OUTPATIENT)
Age: 67
End: 2022-02-19

## 2022-06-10 ENCOUNTER — ONCOLOGY VISIT (OUTPATIENT)
Dept: ONCOLOGY | Facility: CLINIC | Age: 67
End: 2022-06-10
Attending: INTERNAL MEDICINE
Payer: MEDICARE

## 2022-06-10 VITALS
BODY MASS INDEX: 17.28 KG/M2 | WEIGHT: 94.5 LBS | SYSTOLIC BLOOD PRESSURE: 127 MMHG | RESPIRATION RATE: 16 BRPM | DIASTOLIC BLOOD PRESSURE: 71 MMHG | OXYGEN SATURATION: 98 % | HEART RATE: 94 BPM | TEMPERATURE: 98 F

## 2022-06-10 DIAGNOSIS — Z85.3 PERSONAL HISTORY OF MALIGNANT NEOPLASM OF BREAST: Primary | ICD-10-CM

## 2022-06-10 PROCEDURE — 99214 OFFICE O/P EST MOD 30 MIN: CPT | Performed by: INTERNAL MEDICINE

## 2022-06-10 PROCEDURE — G0463 HOSPITAL OUTPT CLINIC VISIT: HCPCS

## 2022-06-10 RX ORDER — FLUCONAZOLE 150 MG/1
TABLET ORAL
Status: ON HOLD | COMMUNITY
Start: 2022-03-24 | End: 2024-01-01

## 2022-06-10 RX ORDER — ACETAMINOPHEN 500 MG
1000 TABLET ORAL
Status: ON HOLD | COMMUNITY
End: 2024-01-01

## 2022-06-10 ASSESSMENT — PAIN SCALES - GENERAL: PAINLEVEL: NO PAIN (0)

## 2022-06-10 NOTE — PROGRESS NOTES
DIAGNOSIS:  History of Stage I, node negative, breast cancer.  It was multifocal.  ER, [place] positive, grade 1, negative for angiolymphatic invasion. One tumor was 2 cm, another was 0.5.  Had a low Oncotype score of 7.  She was treated with bilateral mastectomy, oophorectomy, started Tamoxifen in 2006 and took it for 5 years.  She has now been on extended endocrine therapy with Arimidex since June 2011.  She had been receiving q 6 month zolendronate but this was stopped Dec 2013 as it might have been causing her rib musculskeletal symptoms.  She switched to tamoxifen in June 2017. She stopped tamoxifen in December 2021.     INTERIM HISTORY:  Ms. Lam returns after 6 months.  She has stopped the tamoxifen and notices no substantial differences in her health.    She continues to have problems with her lip swelling.  She got her fourth COVID booster and this really increased her lip swelling.  She also saw a specialist at the [facility] who thought this could be a yeast infection.  However there were no yeast cultured.  She has been taking what sounds like a cholinergic agent (Bethanechol)  to increase her saliva and she thinks this actually helps the most.    She also states she got swelling for the flu vaccination.  She is starting to wonder if perhaps there is an ingredient within the injectable immunizations that caused her to swell.  She will be returning to [place] soon and will have a discussion with her allergist there.    Review of systems is otherwise largely unremarkable.  Other than the difficulties having with her lip she has no substantial concerns or new findings.    SOCIAL HISTORY: She has a new grandchild in [place] and will be traveling there soon.    Review of systems is otherwise completely unremarkable.  The shortness of breath she had in the past is largely resolved and she does suggest that this could have been anxiety.       PHYSICAL EXAMINATION:   /71 (BP Location: Left arm,  Patient Position: Sitting, Cuff Size: Child)   Pulse 94   Temp 98  F (36.7  C) (Oral)   Resp 16   Wt 42.9 kg (94 lb 8 oz)   SpO2 98%   BMI 17.28 kg/m      GENERAL: She looks well. She is in no distress  HEENT: Her lower lip and sub Q tissue is a little more prominent. She states that it had been worse. There is no neck adenopathy.  NODES: No axillary adenopathy  LUNGS: Clear  IMPLANTS: The implants are well healed  HEART: Normal  ABDOMEN: No HS megaly  NEURO: Intact      PROBLEMS:   1.  History of ER positive breast cancer on extended therapy since about 02/2006. OncotypeDx score = 7  2.  Textured breast implants - recent revision of the left breast implant  3.  Low bone density/Osteoporosis on outside scan   4. H/O GERD better now.  5. Intermittent SOB  6. Lower lip swelling - uncertain etiology        IMPRESSION:  Ms. Lam is mainly troubled by her lip swelling.  She still doesn't have a clear diagnosis and will explore an allergic reaction to some vaccine components with h er allergist in [place].     We talked briefly about a ultralow risk patients recently described on the [place] cohort.  I think she fits in that group.  She is worried about her bone density going off tamoxifen and she will see if she can get a DEXA scan a year since her last one.    Of note is that she did not tolerate bisphosphonates in the past because of her substantial GERD.  She could be a candidate for either IV bisphosphonate or subcutaneous denosumab.    PLAN:   1. Continue off tamoxifen  2. RTC in 6 months.  3. Contact us sooner as necessary.       Lefty Matthew MD

## 2022-06-10 NOTE — NURSING NOTE
"Oncology Rooming Note    Nohelia 10, 2022 10:43 AM   Abigail Lam is a 67 year old female who presents for:    Chief Complaint   Patient presents with     Oncology Clinic Visit     Breast cancer-Personal history of malignant neoplasm of breast     Initial Vitals: /71 (BP Location: Left arm, Patient Position: Sitting, Cuff Size: Child)   Pulse 94   Temp 98  F (36.7  C) (Oral)   Resp 16   Wt 42.9 kg (94 lb 8 oz)   SpO2 98%   BMI 17.28 kg/m   Estimated body mass index is 17.28 kg/m  as calculated from the following:    Height as of 12/20/19: 1.575 m (5' 2.01\").    Weight as of this encounter: 42.9 kg (94 lb 8 oz). Body surface area is 1.37 meters squared.  No Pain (0) Comment: Data Unavailable   No LMP recorded. Patient is postmenopausal.  Allergies reviewed: Yes  Medications reviewed: Yes    Medications: MEDICATION REFILLS NEEDED TODAY. Provider was notified.  Pharmacy name entered into [employer]:    [id number] [place] [place] [facility] [address] [facility] [place], [place] - [address] [id number] [place], [place] - [address] [facility]  [id number] [place], [place] - [address] [facility]    Clinical concerns: Please discuss and refill Tamoxifen-pharmacy updated. Patient may need other refills depending upon plan of care changes.        Celena Smith LPN Nohelia 10, 2022 10:44 AM                "

## 2022-06-10 NOTE — LETTER
6/10/2022         RE: Abigail Lam  [address] [facility] [address]        Dear Colleague,    Thank you for referring your patient, Abigail Lam, to the [facility]. Please see a copy of my visit note below.    DIAGNOSIS:  History of Stage I, node negative, breast cancer.  It was multifocal.  ER, [place] positive, grade 1, negative for angiolymphatic invasion. One tumor was 2 cm, another was 0.5.  Had a low Oncotype score of 7.  She was treated with bilateral mastectomy, oophorectomy, started Tamoxifen in 2006 and took it for 5 years.  She has now been on extended endocrine therapy with Arimidex since June 2011.  She had been receiving q 6 month zolendronate but this was stopped Dec 2013 as it might have been causing her rib musculskeletal symptoms.  She switched to tamoxifen in June 2017. She stopped tamoxifen in December 2021.     INTERIM HISTORY:  Ms. Lam returns after 6 months.  She has stopped the tamoxifen and notices no substantial differences in her health.    She continues to have problems with her lip swelling.  She got her fourth COVID booster and this really increased her lip swelling.  She also saw a specialist at the [facility] who thought this could be a yeast infection.  However there were no yeast cultured.  She has been taking what sounds like a cholinergic agent (Bethanechol)  to increase her saliva and she thinks this actually helps the most.    She also states she got swelling for the flu vaccination.  She is starting to wonder if perhaps there is an ingredient within the injectable immunizations that caused her to swell.  She will be returning to [place] soon and will have a discussion with her allergist there.    Review of systems is otherwise largely unremarkable.  Other than the difficulties having with her lip she has no substantial concerns or new findings.    SOCIAL HISTORY: She has a new grandchild in [place] and will be traveling there  soon.    Review of systems is otherwise completely unremarkable.  The shortness of breath she had in the past is largely resolved and she does suggest that this could have been anxiety.       PHYSICAL EXAMINATION:   /71 (BP Location: Left arm, Patient Position: Sitting, Cuff Size: Child)   Pulse 94   Temp 98  F (36.7  C) (Oral)   Resp 16   Wt 42.9 kg (94 lb 8 oz)   SpO2 98%   BMI 17.28 kg/m      GENERAL: She looks well. She is in no distress  HEENT: Her lower lip and sub Q tissue is a little more prominent. She states that it had been worse. There is no neck adenopathy.  NODES: No axillary adenopathy  LUNGS: Clear  IMPLANTS: The implants are well healed  HEART: Normal  ABDOMEN: No HS megaly  NEURO: Intact      PROBLEMS:   1.  History of ER positive breast cancer on extended therapy since about 02/2006. OncotypeDx score = 7  2.  Textured breast implants - recent revision of the left breast implant  3.  Low bone density/Osteoporosis on outside scan   4. H/O GERD better now.  5. Intermittent SOB  6. Lower lip swelling - uncertain etiology        IMPRESSION:  Ms. Lam is mainly troubled by her lip swelling.  She still doesn't have a clear diagnosis and will explore an allergic reaction to some vaccine components with h er allergist in [place].     We talked briefly about a ultralow risk patients recently described on the [place] cohort.  I think she fits in that group.  She is worried about her bone density going off tamoxifen and she will see if she can get a DEXA scan a year since her last one.    Of note is that she did not tolerate bisphosphonates in the past because of her substantial GERD.  She could be a candidate for either IV bisphosphonate or subcutaneous denosumab.    PLAN:   1. Continue off tamoxifen  2. RTC in 6 months.  3. Contact us sooner as necessary.              Again, thank you for allowing me to participate in the care of your patient.      Sincerely,    Lefty Matthew MD

## 2022-09-30 ENCOUNTER — ONCOLOGY VISIT (OUTPATIENT)
Dept: ONCOLOGY | Facility: CLINIC | Age: 67
End: 2022-09-30
Attending: INTERNAL MEDICINE
Payer: MEDICARE

## 2022-09-30 VITALS
SYSTOLIC BLOOD PRESSURE: 115 MMHG | TEMPERATURE: 97.9 F | HEART RATE: 101 BPM | BODY MASS INDEX: 17.24 KG/M2 | OXYGEN SATURATION: 96 % | DIASTOLIC BLOOD PRESSURE: 74 MMHG | WEIGHT: 94.3 LBS

## 2022-09-30 DIAGNOSIS — Z85.3 PERSONAL HISTORY OF MALIGNANT NEOPLASM OF BREAST: Primary | ICD-10-CM

## 2022-09-30 PROCEDURE — G0463 HOSPITAL OUTPT CLINIC VISIT: HCPCS

## 2022-09-30 PROCEDURE — 99214 OFFICE O/P EST MOD 30 MIN: CPT | Performed by: INTERNAL MEDICINE

## 2022-09-30 RX ORDER — CLOTRIMAZOLE AND BETAMETHASONE DIPROPIONATE 10; .64 MG/G; MG/G
CREAM TOPICAL
COMMUNITY
Start: 2021-02-26

## 2022-09-30 RX ORDER — ATORVASTATIN CALCIUM 10 MG/1
1 TABLET, FILM COATED ORAL EVERY OTHER DAY
Status: ON HOLD | COMMUNITY
Start: 2022-08-10 | End: 2024-01-01

## 2022-09-30 RX ORDER — LORAZEPAM 0.5 MG/1
0.5 TABLET ORAL
Status: ON HOLD | COMMUNITY
Start: 2021-04-05 | End: 2024-01-01

## 2022-09-30 RX ORDER — EPINEPHRINE 0.3 MG/.3ML
INJECTION SUBCUTANEOUS
COMMUNITY
Start: 2022-08-12

## 2022-09-30 RX ORDER — CLONAZEPAM 0.5 MG/1
TABLET ORAL
COMMUNITY
Start: 2022-09-20 | End: 2023-01-01

## 2022-09-30 RX ORDER — BETHANECHOL CHLORIDE 25 MG/1
25 TABLET ORAL 3 TIMES DAILY
COMMUNITY
Start: 2022-01-15

## 2022-09-30 RX ORDER — CYANOCOBALAMIN 1000 UG/ML
1000 INJECTION, SOLUTION INTRAMUSCULAR; SUBCUTANEOUS
COMMUNITY
Start: 2022-04-21 | End: 2023-03-23

## 2022-09-30 RX ORDER — FAMOTIDINE 40 MG/1
1 TABLET, FILM COATED ORAL EVERY EVENING
Status: ON HOLD | COMMUNITY
Start: 2022-07-11 | End: 2024-01-01

## 2022-09-30 RX ORDER — DOCUSATE SODIUM 100 MG/1
100 CAPSULE, LIQUID FILLED ORAL DAILY
COMMUNITY

## 2022-09-30 RX ORDER — COVID-19 MOLECULAR TEST ASSAY
KIT MISCELLANEOUS
COMMUNITY
Start: 2022-06-02 | End: 2024-01-01

## 2022-09-30 RX ORDER — METHYLPREDNISOLONE 4 MG
TABLET, DOSE PACK ORAL
COMMUNITY
Start: 2022-08-16 | End: 2024-01-01

## 2022-09-30 RX ORDER — NYSTATIN AND TRIAMCINOLONE ACETONIDE 100000; 1 [USP'U]/G; MG/G
OINTMENT TOPICAL
Status: ON HOLD | COMMUNITY
Start: 2021-10-11 | End: 2024-01-01

## 2022-09-30 RX ORDER — ONDANSETRON 4 MG/1
4 TABLET, ORALLY DISINTEGRATING ORAL
COMMUNITY
Start: 2020-01-26 | End: 2024-01-01

## 2022-09-30 ASSESSMENT — PAIN SCALES - GENERAL: PAINLEVEL: NO PAIN (0)

## 2022-09-30 NOTE — LETTER
9/30/2022         RE: Abigail Lam  [address] [facility] [id number]        Dear Colleague,    Thank you for referring your patient, Abigail Lam, to the [facility]. Please see a copy of my visit note below.    DIAGNOSIS:  History of Stage I, node negative, breast cancer.  It was multifocal.  ER, [place] positive, grade 1, negative for angiolymphatic invasion. One tumor was 2 cm, another was 0.5.  Had a low Oncotype score of 7.  She was treated with bilateral mastectomy, oophorectomy, started Tamoxifen in 2006 and took it for 5 years.  She has now been on extended endocrine therapy with Arimidex since June 2011.  She had been receiving q 6 month zolendronate but this was stopped Dec 2013 as it might have been causing her rib musculskeletal symptoms.  She switched to tamoxifen in June 2017. She stopped tamoxifen in December 2021.     INTERIM HISTORY:  Ms. Lam returns after 4 months, she came back a little early to discuss two things.    First, she was worried about some left arm pain.  She thinks she slept on it in unusual position and injured it but it is getting a little better.    Second, she is worried about her lip swelling and seems to be getting a little bit worse.  As noted in the past records she thinks it gets worse with COVID vaccination.  Shortly after I saw her she developed COVID in February and she thinks it was worse then.  She thinks it is more painful than it has been in the past and this has been different.  She contacted the dermatologist at the [facility] who would like to see her and potentially biopsy the area.  She was also given a steroid pack with rapid taper.  She has not taken that because she is worried about immunosuppression.  We discussed the fact rapid steroid taper is not particularly immunosuppressive.    Otherwise she has done fairly well.  She has several life stressors as noted below.  But she really has no symptoms otherwise.    SOCIAL  "HISTORY: She is working to repair some water damage in her house.  She is planning to have her family over the weekend and the work is not completely done yet.  In addition one of her grandchildren who lives in the [facility] area was recently diagnosed with the retinal detachment.  She is concerned about this as apparently the child does not have vision in that eye.    Review of systems is otherwise largely unremarkable.  Other than the difficulties having with her lip she has no substantial concerns or new findings.    SOCIAL HISTORY: She has a new grandchild in [place] and will be traveling there soon.    Review of systems is otherwise completely unremarkable.  The shortness of breath she had in the past is largely resolved and she does suggest that this could have been anxiety.       PHYSICAL EXAMINATION:   /74 (BP Location: Right arm, Patient Position: Sitting, Cuff Size: Adult Regular)   Pulse 101   Temp 97.9  F (36.6  C) (Oral)   Wt 42.8 kg (94 lb 4.8 oz)   SpO2 96%   BMI 17.24 kg/m      GENERAL: She looks well. She is in no distress  HEENT: Her lower lip and sub Q tissue is a little more prominent today than before. On palpation, there is no definable mass.  There is no neck adenopathy.  NODES: No axillary adenopathy  LUNGS: Clear  IMPLANTS: The implants are well healed  HEART: Normal  ABDOMEN: No HS megaly  NEURO: Intact      PROBLEMS:   1.  History of ER positive breast cancer on extended therapy since about 02/2006. OncotypeDx score = 7  2.  Textured breast implants - recent revision of the left breast implant  3.  Low bone density/Osteoporosis on outside scan   4. H/O GERD better now.  5. Intermittent SOB  6. Lower lip swelling - uncertain etiology - diagnosed as \"burning mouth syndrome\"        IMPRESSION:  Ms. Lam is mainly troubled by her lip swelling.  She does not have a definitive diagnosis and her dermatologist and the [facility] wants to biopsy this.  I suggested she try the " steroid Dosepak to see if this alleviates the swelling.  If it does it says something about this being an inflammatory process.  She wondered whether taking steroids which change biopsy result.  I told her that certainly a therapeutic course of steroids might help to find a process and I would not worry about it interfering with the potential biopsy.     PLAN:   1. Continue off tamoxifen  2. RTC in 6 months.  3. Contact us sooner as necessary.  4. Follow up with [facility] MDs. If problem is not resolved, perhaps referral to ENT here would be warranted.       Lefty Matthew MD

## 2022-09-30 NOTE — PROGRESS NOTES
DIAGNOSIS:  History of Stage I, node negative, breast cancer.  It was multifocal.  ER, NV positive, grade 1, negative for angiolymphatic invasion. One tumor was 2 cm, another was 0.5.  Had a low Oncotype score of 7.  She was treated with bilateral mastectomy, oophorectomy, started Tamoxifen in 2006 and took it for 5 years.  She has now been on extended endocrine therapy with Arimidex since June 2011.  She had been receiving q 6 month zolendronate but this was stopped Dec 2013 as it might have been causing her rib musculskeletal symptoms.  She switched to tamoxifen in June 2017. She stopped tamoxifen in December 2021.     INTERIM HISTORY:  Ms. Lam returns after 4 months, she came back a little early to discuss two things.    First, she was worried about some left arm pain.  She thinks she slept on it in unusual position and injured it but it is getting a little better.    Second, she is worried about her lip swelling and seems to be getting a little bit worse.  As noted in the past records she thinks it gets worse with COVID vaccination.  Shortly after I saw her she developed COVID in February and she thinks it was worse then.  She thinks it is more painful than it has been in the past and this has been different.  She contacted the dermatologist at the [facility] who would like to see her and potentially biopsy the area.  She was also given a steroid pack with rapid taper.  She has not taken that because she is worried about immunosuppression.  We discussed the fact rapid steroid taper is not particularly immunosuppressive.    Otherwise she has done fairly well.  She has several life stressors as noted below.  But she really has no symptoms otherwise.    SOCIAL HISTORY: She is working to repair some water damage in her house.  She is planning to have her family over the weekend and the work is not completely done yet.  In addition one of her grandchildren who lives in the [facility] area was recently diagnosed  "with the retinal detachment.  She is concerned about this as apparently the child does not have vision in that eye.    Review of systems is otherwise largely unremarkable.  Other than the difficulties having with her lip she has no substantial concerns or new findings.    SOCIAL HISTORY: She has a new grandchild in [place] and will be traveling there soon.    Review of systems is otherwise completely unremarkable.  The shortness of breath she had in the past is largely resolved and she does suggest that this could have been anxiety.       PHYSICAL EXAMINATION:   /74 (BP Location: Right arm, Patient Position: Sitting, Cuff Size: Adult Regular)   Pulse 101   Temp 97.9  F (36.6  C) (Oral)   Wt 42.8 kg (94 lb 4.8 oz)   SpO2 96%   BMI 17.24 kg/m      GENERAL: She looks well. She is in no distress  HEENT: Her lower lip and sub Q tissue is a little more prominent today than before. On palpation, there is no definable mass.  There is no neck adenopathy.  NODES: No axillary adenopathy  LUNGS: Clear  IMPLANTS: The implants are well healed  HEART: Normal  ABDOMEN: No HS megaly  NEURO: Intact      PROBLEMS:   1.  History of ER positive breast cancer on extended therapy since about 02/2006. OncotypeDx score = 7  2.  Textured breast implants - recent revision of the left breast implant  3.  Low bone density/Osteoporosis on outside scan   4. H/O GERD better now.  5. Intermittent SOB  6. Lower lip swelling - uncertain etiology - diagnosed as \"burning mouth syndrome\"        IMPRESSION:  Ms. Lam is mainly troubled by her lip swelling.  She does not have a definitive diagnosis and her dermatologist and the [facility] wants to biopsy this.  I suggested she try the steroid Dosepak to see if this alleviates the swelling.  If it does it says something about this being an inflammatory process.  She wondered whether taking steroids which change biopsy result.  I told her that certainly a therapeutic course of steroids " might help to find a process and I would not worry about it interfering with the potential biopsy.     PLAN:   1. Continue off tamoxifen  2. RTC in 6 months.  3. Contact us sooner as necessary.  4. Follow up with [facility] MDs. If problem is not resolved, perhaps referral to ENT here would be warranted.       Lefty Matthew MD

## 2022-09-30 NOTE — NURSING NOTE
"Oncology Rooming Note    September 30, 2022 11:03 AM   Abigail Lam is a 67 year old female who presents for:    Chief Complaint   Patient presents with     Oncology Clinic Visit     Personal history of malignant neoplasm of breast     Initial Vitals: /74 (BP Location: Right arm, Patient Position: Sitting, Cuff Size: Adult Regular)   Pulse 101   Temp 97.9  F (36.6  C) (Oral)   Wt 42.8 kg (94 lb 4.8 oz)   SpO2 96%   BMI 17.24 kg/m   Estimated body mass index is 17.24 kg/m  as calculated from the following:    Height as of 12/20/19: 1.575 m (5' 2.01\").    Weight as of this encounter: 42.8 kg (94 lb 4.8 oz). Body surface area is 1.37 meters squared.  No Pain (0) Comment: Data Unavailable   No LMP recorded. Patient is postmenopausal.  Allergies reviewed: Yes  Medications reviewed: Yes    Medications: Medication refills not needed today.  Pharmacy name entered into EZ2CAD:    [id number] [place], [place] - [address] [facility] [place], [place] - [address] [id number] [place], [place] - [address] [facility]  [id number] [place], [place] - [address] [facility]    Clinical concerns: none.       Glen Murdock"

## 2022-10-22 ENCOUNTER — HEALTH MAINTENANCE LETTER (OUTPATIENT)
Age: 67
End: 2022-10-22

## 2022-10-27 PROCEDURE — 87077 CULTURE AEROBIC IDENTIFY: CPT | Mod: ORL | Performed by: DENTIST

## 2022-10-28 ENCOUNTER — LAB REQUISITION (OUTPATIENT)
Dept: LAB | Facility: CLINIC | Age: 67
End: 2022-10-28
Payer: MEDICARE

## 2022-10-28 DIAGNOSIS — M27.2 INFLAMMATORY CONDITIONS OF JAWS: ICD-10-CM

## 2022-10-31 LAB
BACTERIA SPEC CULT: ABNORMAL
BACTERIA SPEC CULT: ABNORMAL

## 2022-11-04 ENCOUNTER — LAB REQUISITION (OUTPATIENT)
Dept: LAB | Facility: CLINIC | Age: 67
End: 2022-11-04
Payer: MEDICARE

## 2022-11-04 PROCEDURE — 87252 VIRUS INOCULATION TISSUE: CPT | Mod: ORL | Performed by: DENTIST

## 2022-12-09 ENCOUNTER — PATIENT OUTREACH (OUTPATIENT)
Dept: ONCOLOGY | Facility: CLINIC | Age: 67
End: 2022-12-09

## 2022-12-09 NOTE — PROGRESS NOTES
Patient calling to request her appointment with Dr Matthew on 12/16/2022 to be a virtual visit to discuss lip biopsy done at the [facility]. Patient stated she had taken the first two tablets of the short burst Prednisone pack and discontinued due to worsening lip swelling. Prescribing MD notified it was discontinued. Message sent to scheduling to change the appointment to a virtual visit.  Answered all patient's questions and verbalized understanding. Liss Campos RN, BSN.

## 2022-12-16 ENCOUNTER — VIRTUAL VISIT (OUTPATIENT)
Dept: ONCOLOGY | Facility: CLINIC | Age: 67
End: 2022-12-16
Attending: INTERNAL MEDICINE
Payer: MEDICARE

## 2022-12-16 VITALS — HEIGHT: 62 IN | BODY MASS INDEX: 17.11 KG/M2 | WEIGHT: 93 LBS

## 2022-12-16 DIAGNOSIS — Z85.3 PERSONAL HISTORY OF MALIGNANT NEOPLASM OF BREAST: Primary | ICD-10-CM

## 2022-12-16 PROCEDURE — 99213 OFFICE O/P EST LOW 20 MIN: CPT | Mod: 95 | Performed by: INTERNAL MEDICINE

## 2022-12-16 ASSESSMENT — PAIN SCALES - GENERAL: PAINLEVEL: MILD PAIN (2)

## 2022-12-16 NOTE — LETTER
12/16/2022         RE: Abigail Lam  [address]  [facility] [address]        Dear Colleague,    Thank you for referring your patient, Abigail Lam, to the [facility]. Please see a copy of my visit note below.    Abigail is a 67 year old who is being evaluated via a billable video visit.      How would you like to obtain your AVS? MyChart  If the video visit is dropped, the invitation should be resent by: Send to e-mail at: [email or web address]  Will anyone else be joining your video visit? Yes:  has joined . How would they like to receive their invitation?         Sapna Batista    Video-Visit Details    Video Start Time: 1000    Type of service:  Video Visit    Video End Time: 1027    Originating Location (pt. Location): Home    Distant Location (provider location):  On Site    Platform used for Video Visit: Denise    DIAGNOSIS:  History of Stage I, node negative, breast cancer.  It was multifocal.  ER, CA positive, grade 1, negative for angiolymphatic invasion. One tumor was 2 cm, another was 0.5.  Had a low Oncotype score of 7.  She was treated with bilateral mastectomy, oophorectomy, started Tamoxifen in 2006 and took it for 5 years.  She has now been on extended endocrine therapy with Arimidex since June 2011.  She had been receiving q 6 month zolendronate but this was stopped Dec 2013 as it might have been causing her rib musculskeletal symptoms.  She switched to tamoxifen in June 2017. She stopped tamoxifen in December 2021.     INTERIM HISTORY:  Ms. Lam returns after a short interval.    Since I last saw her she did have a biopsy of the lesion around her lip.  She states this was done within the [facility] system but I cannot see any records from that.  She told me that they found a bacterial infection it was a Enterococcus faecalis.  This is not unusual infection for soft tissue.  She did get penicillin and she thinks it is improved but not all the way  "improved.    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  Her ENT physician suggested she see a neurologist but did not know exactly what the indication was.  I suggested she go back and talk to her ENT physician to have a discussion about what the neurologist would help with.    She also had an episode of severe left pain that originated from her rib.  She has had this before.  However this episode was associated with feeling that she had gas and she actually had emesis.  Once that occurred her pain was resolved and and we discussed that this was likely referred pain from what ever went on in her stomach.  However she does have more GERD now after this episode and will see her GI physician.      Review of systems is otherwise largely unremarkable.  Other than the difficulties having with her lip she has no substantial concerns or new findings.    SOCIAL HISTORY: She has a new grandchild in [place] and will be traveling there soon.    Review of systems is otherwise completely unremarkable.  The shortness of breath she had in the past is largely resolved and she does suggest that this could have been anxiety.       PHYSICAL EXAMINATION:   Looked well on Video. She has no focal signs      PROBLEMS:   1.  History of ER positive breast cancer on extended therapy since about 02/2006. OncotypeDx score = 7  2.  Textured breast implants - recent revision of the left breast implant  3.  Low bone density/Osteoporosis on outside scan   4. H/O GERD worse with emesis episode noted above  5. Intermittent SOB  6. Lower lip swelling - uncertain etiology - diagnosed as \"burning mouth syndrome\"  7. Diagnosis of enterococcus soft tissue infection of lip        IMPRESSION:  Ms. Lam want to make a lot up-to-date with her symptoms.  I am not sure why she has the symptoms that she does have.  Of note is that she is reluctant to get another COVID vaccination because she thinks that that " exacerbated the last time.  She asked me today about drawing antibody titers but she may be out of state at that time I suggest she find a local person to do that.    PLAN:   1. Continue off tamoxifen  2. RTC for regularly scheduled appointment.  3. Contact us sooner as necessary.       Lefty Matthew MD

## 2022-12-16 NOTE — PROGRESS NOTES
Abigail is a 67 year old who is being evaluated via a billable video visit.      How would you like to obtain your AVS? MyChart  If the video visit is dropped, the invitation should be resent by: Send to e-mail at: [email or web address]  Will anyone else be joining your video visit? Yes:  has joined . How would they like to receive their invitation?         Sapna Batista    Video-Visit Details    Video Start Time: 1000    Type of service:  Video Visit    Video End Time: 1027    Originating Location (pt. Location): Home    Distant Location (provider location):  On Site    Platform used for Video Visit: MeeVee    DIAGNOSIS:  History of Stage I, node negative, breast cancer.  It was multifocal.  ER, NV positive, grade 1, negative for angiolymphatic invasion. One tumor was 2 cm, another was 0.5.  Had a low Oncotype score of 7.  She was treated with bilateral mastectomy, oophorectomy, started Tamoxifen in 2006 and took it for 5 years.  She has now been on extended endocrine therapy with Arimidex since June 2011.  She had been receiving q 6 month zolendronate but this was stopped Dec 2013 as it might have been causing her rib musculskeletal symptoms.  She switched to tamoxifen in June 2017. She stopped tamoxifen in December 2021.     INTERIM HISTORY:  Ms. Lam returns after a short interval.    Since I last saw her she did have a biopsy of the lesion around her lip.  She states this was done within the [facility] system but I cannot see any records from that.  She told me that they found a bacterial infection it was a Enterococcus faecalis.  This is not unusual infection for soft tissue.  She did get penicillin and she thinks it is improved but not all the way improved.    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  Her ENT physician suggested she see a neurologist but did not know exactly what the indication was.  I suggested she go back and talk to her ENT physician to have a  "discussion about what the neurologist would help with.    She also had an episode of severe left pain that originated from her rib.  She has had this before.  However this episode was associated with feeling that she had gas and she actually had emesis.  Once that occurred her pain was resolved and and we discussed that this was likely referred pain from what ever went on in her stomach.  However she does have more GERD now after this episode and will see her GI physician.      Review of systems is otherwise largely unremarkable.  Other than the difficulties having with her lip she has no substantial concerns or new findings.    SOCIAL HISTORY: She has a new grandchild in [place] and will be traveling there soon.    Review of systems is otherwise completely unremarkable.  The shortness of breath she had in the past is largely resolved and she does suggest that this could have been anxiety.       PHYSICAL EXAMINATION:   Looked well on Video. She has no focal signs      PROBLEMS:   1.  History of ER positive breast cancer on extended therapy since about 02/2006. OncotypeDx score = 7  2.  Textured breast implants - recent revision of the left breast implant  3.  Low bone density/Osteoporosis on outside scan   4. H/O GERD worse with emesis episode noted above  5. Intermittent SOB  6. Lower lip swelling - uncertain etiology - diagnosed as \"burning mouth syndrome\"  7. Diagnosis of enterococcus soft tissue infection of lip        IMPRESSION:  Ms. Lam want to make a lot up-to-date with her symptoms.  I am not sure why she has the symptoms that she does have.  Of note is that she is reluctant to get another COVID vaccination because she thinks that that exacerbated the last time.  She asked me today about drawing antibody titers but she may be out of state at that time I suggest she find a local person to do that.    PLAN:   1. Continue off tamoxifen  2. RTC for regularly scheduled appointment.  3. Contact us sooner " as necessary.       Lefty Matthew MD

## 2023-01-01 ENCOUNTER — ANCILLARY PROCEDURE (OUTPATIENT)
Dept: GENERAL RADIOLOGY | Facility: CLINIC | Age: 68
End: 2023-01-01
Attending: INTERNAL MEDICINE
Payer: MEDICARE

## 2023-01-01 ENCOUNTER — MYC MEDICAL ADVICE (OUTPATIENT)
Dept: ONCOLOGY | Facility: CLINIC | Age: 68
End: 2023-01-01
Payer: MEDICARE

## 2023-01-01 ENCOUNTER — VIRTUAL VISIT (OUTPATIENT)
Dept: ONCOLOGY | Facility: CLINIC | Age: 68
End: 2023-01-01
Attending: INTERNAL MEDICINE
Payer: MEDICARE

## 2023-01-01 VITALS — HEIGHT: 62 IN | BODY MASS INDEX: 16.93 KG/M2 | WEIGHT: 92 LBS

## 2023-01-01 DIAGNOSIS — Z85.3 PERSONAL HISTORY OF MALIGNANT NEOPLASM OF BREAST: Primary | ICD-10-CM

## 2023-01-01 DIAGNOSIS — M54.50 CHRONIC MIDLINE LOW BACK PAIN WITHOUT SCIATICA: ICD-10-CM

## 2023-01-01 DIAGNOSIS — G89.29 CHRONIC MIDLINE LOW BACK PAIN WITHOUT SCIATICA: ICD-10-CM

## 2023-01-01 PROCEDURE — 99213 OFFICE O/P EST LOW 20 MIN: CPT | Mod: 95 | Performed by: INTERNAL MEDICINE

## 2023-01-01 PROCEDURE — 72100 X-RAY EXAM L-S SPINE 2/3 VWS: CPT | Performed by: STUDENT IN AN ORGANIZED HEALTH CARE EDUCATION/TRAINING PROGRAM

## 2023-01-01 RX ORDER — CLONAZEPAM 0.5 MG/1
TABLET ORAL
Qty: 60 TABLET | Refills: 0 | Status: SHIPPED | OUTPATIENT
Start: 2023-01-01 | End: 2024-01-01

## 2023-01-01 ASSESSMENT — PAIN SCALES - GENERAL: PAINLEVEL: NO PAIN (0)

## 2023-04-01 ENCOUNTER — HEALTH MAINTENANCE LETTER (OUTPATIENT)
Age: 68
End: 2023-04-01

## 2023-04-07 ENCOUNTER — ONCOLOGY VISIT (OUTPATIENT)
Dept: ONCOLOGY | Facility: CLINIC | Age: 68
End: 2023-04-07
Attending: INTERNAL MEDICINE
Payer: MEDICARE

## 2023-04-07 VITALS
BODY MASS INDEX: 17.5 KG/M2 | HEART RATE: 89 BPM | WEIGHT: 95.7 LBS | SYSTOLIC BLOOD PRESSURE: 80 MMHG | RESPIRATION RATE: 16 BRPM | OXYGEN SATURATION: 98 % | TEMPERATURE: 97.4 F | DIASTOLIC BLOOD PRESSURE: 56 MMHG

## 2023-04-07 DIAGNOSIS — Z85.3 PERSONAL HISTORY OF MALIGNANT NEOPLASM OF BREAST: Primary | ICD-10-CM

## 2023-04-07 PROCEDURE — 99213 OFFICE O/P EST LOW 20 MIN: CPT | Performed by: INTERNAL MEDICINE

## 2023-04-07 PROCEDURE — G0463 HOSPITAL OUTPT CLINIC VISIT: HCPCS | Performed by: INTERNAL MEDICINE

## 2023-04-07 RX ORDER — FLUCONAZOLE 100 MG/1
1 TABLET ORAL DAILY
Status: ON HOLD | COMMUNITY
Start: 2021-10-18 | End: 2024-01-01

## 2023-04-07 RX ORDER — SODIUM FLUORIDE1.1%, POTASSIUM NITRATE 5% 57.5; 5.8 MG/ML; MG/ML
GEL, DENTIFRICE DENTAL
COMMUNITY
Start: 2023-03-21

## 2023-04-07 ASSESSMENT — PAIN SCALES - GENERAL: PAINLEVEL: NO PAIN (0)

## 2023-04-07 NOTE — PROGRESS NOTES
DIAGNOSIS: Ms. Lam is a 67 year old woman with a history of Stage I, node negative, breast cancer.  It was multifocal.  ER, NE positive, grade 1, negative for angiolymphatic invasion. One tumor was 2 cm, another was 0.5.  Had a low Oncotype score of 7.  She was treated with bilateral mastectomy, oophorectomy, started Tamoxifen in 2006 and took it for 5 years.  She has now been on extended endocrine therapy with Arimidex since June 2011.  She had been receiving q 6 month zolendronate but this was stopped Dec 2013 as it might have been causing her rib musculskeletal symptoms.  She switched to tamoxifen in June 2017. She stopped tamoxifen in December 2021.     INTERIM HISTORY:  Ms. Lam returns for a regularly scheduled visit.  I did see her early because of concerns about her lip.  Since I last saw her she saw Dr. Chidi Johnson in [place] for her lip condition.  He is a rheumatologist and thought that this could be a drug reaction to tamoxifen although she has not been on this for some time.  He also dimitry some blood test to rule out autoimmune disease and these are available to review.  From my evaluation is nothing strikingly abnormal she has a borderline low complement C3.  He thought that perhaps she had dry mouth from her endocrine therapy.  Of note that she has not been on an aromatase inhibitor for many years and tamoxifen tends to act as a weak estrogen and many target tissues including endothelial areas.    She also had a tooth pulled from her right lower jaw.  She will have an implant eventually but it could not be done immediately.    As noted in the last note she thinks that her lip is improved with Klonopin and penicillin.  Apparently there is some literature that Klonopin may work in some cases of autoimmune disease.  She will discuss this with her rheumatologist when she has a follow-up visit next week.    Otherwise she has done very well.  Her review of systems is largely negative.  Her GERD is  "well controlled and she thinks her lip is better controlled now than previously.  She does describe some right-sided hip pain which she thinks is bursitis.  She brought this to attention when orthopedic surgeon who recommended that she receive an MRI to rule out metastatic disease.  The nature of this pain is local intermittent in nature and I do not think is consistent with advanced disease or needs evaluation with an MRI.    SOCIAL HISTORY: She and her  are planning a trip to [place] and [place] next week.  Her  is getting a prostate MRI because of an enlarged prostate.  She asked for a prescription for prophylactic Paxlovid.       PHYSICAL EXAMINATION:  BP (!) 80/56 (BP Location: Right arm, Patient Position: Sitting, Cuff Size: Adult Small)   Pulse 89   Temp 97.4  F (36.3  C) (Oral)   Resp 16   Wt 43.4 kg (95 lb 11.2 oz)   SpO2 98%   BMI 17.50 kg/m    Of note is her BP is low today. However, she has no signs of hypotension on exam and is well perfused in all distal sites.  GENERAL: She looks well she is in no distress.  As mentioned above she is fully conversant although she does admit to perhaps being a little bit dehydrated.  LUNGS: Her lungs are clear to auscultation percussion  CHEST: Her implants are in place there are no lesions  HEART: She has normal heart sounds and a regular rhythm  ABDOMEN: She has no organomegaly or tenderness.  Mouth: She does have a missing tooth in her right lower jaw with a healing extraction site.  NEURO: Grossly intact.      PROBLEMS:   1.  History of ER positive breast cancer on extended therapy since about 02/2006. OncotypeDx score = 7  2.  Textured breast implants - recent revision of the left breast implant  3.  Low bone density/Osteoporosis on outside scan   4. H/O GERD worse with emesis episode noted above  5. Intermittent SOB  6. Lower lip swelling - uncertain etiology - diagnosed as \"burning mouth syndrome\"  7. Diagnosis of enterococcus soft " tissue infection of lip        IMPRESSION:   As mentioned she has main concern of her lower lip symptoms.  She will follow-up with her rheumatologist regarding that.  She did mention that her rheumatologist felt that aromatase inhibitors and tamoxifen would cause the same biological effects.  I explained to her that aromatase inhibitors lower blood estrogen levels while tamoxifen tends to be weak estrogen and many organ sites.  While biologically they have the ability to block estrogen receptor function and breast tissue this is not the case for most peripheral organs while the aromatase inhibitors decrease total body estrogen tamoxifen is an estrogen and some organs.    PLAN:   1. Continue off of systemic therapy.   2. RTC in 6 months  3. Contact us sooner as necessary.  4. Prescription given for prophylactic paxlovid for upcoming trip.     Lefty Matthew MD

## 2023-04-07 NOTE — LETTER
4/7/2023         RE: Abigail Lam  [address] [facility] [address]        Dear Colleague,    Thank you for referring your patient, Abigail Lam, to the [facility]. Please see a copy of my visit note below.    DIAGNOSIS: Ms. Lam is a 67 year old woman with a history of Stage I, node negative, breast cancer.  It was multifocal.  ER, [place] positive, grade 1, negative for angiolymphatic invasion. One tumor was 2 cm, another was 0.5.  Had a low Oncotype score of 7.  She was treated with bilateral mastectomy, oophorectomy, started Tamoxifen in 2006 and took it for 5 years.  She has now been on extended endocrine therapy with Arimidex since June 2011.  She had been receiving q 6 month zolendronate but this was stopped Dec 2013 as it might have been causing her rib musculskeletal symptoms.  She switched to tamoxifen in June 2017. She stopped tamoxifen in December 2021.     INTERIM HISTORY:  Ms. Lam returns for a regularly scheduled visit.  I did see her early because of concerns about her lip.  Since I last saw her she saw Dr. Chidi Johnson in [place] for her lip condition.  He is a rheumatologist and thought that this could be a drug reaction to tamoxifen although she has not been on this for some time.  He also dimitry some blood test to rule out autoimmune disease and these are available to review.  From my evaluation is nothing strikingly abnormal she has a borderline low complement C3.  He thought that perhaps she had dry mouth from her endocrine therapy.  Of note that she has not been on an aromatase inhibitor for many years and tamoxifen tends to act as a weak estrogen and many target tissues including endothelial areas.    She also had a tooth pulled from her right lower jaw.  She will have an implant eventually but it could not be done immediately.    As noted in the last note she thinks that her lip is improved with Klonopin and penicillin.  Apparently there is some  literature that Klonopin may work in some cases of autoimmune disease.  She will discuss this with her rheumatologist when she has a follow-up visit next week.    Otherwise she has done very well.  Her review of systems is largely negative.  Her GERD is well controlled and she thinks her lip is better controlled now than previously.  She does describe some right-sided hip pain which she thinks is bursitis.  She brought this to attention when orthopedic surgeon who recommended that she receive an MRI to rule out metastatic disease.  The nature of this pain is local intermittent in nature and I do not think is consistent with advanced disease or needs evaluation with an MRI.    SOCIAL HISTORY: She and her  are planning a trip to [place] and [place] next week.  Her  is getting a prostate MRI because of an enlarged prostate.  She asked for a prescription for prophylactic Paxlovid.       PHYSICAL EXAMINATION:  BP (!) 80/56 (BP Location: Right arm, Patient Position: Sitting, Cuff Size: Adult Small)   Pulse 89   Temp 97.4  F (36.3  C) (Oral)   Resp 16   Wt 43.4 kg (95 lb 11.2 oz)   SpO2 98%   BMI 17.50 kg/m    Of note is her BP is low today. However, she has no signs of hypotension on exam and is well perfused in all distal sites.  GENERAL: She looks well she is in no distress.  As mentioned above she is fully conversant although she does admit to perhaps being a little bit dehydrated.  LUNGS: Her lungs are clear to auscultation percussion  CHEST: Her implants are in place there are no lesions  HEART: She has normal heart sounds and a regular rhythm  ABDOMEN: She has no organomegaly or tenderness.  Mouth: She does have a missing tooth in her right lower jaw with a healing extraction site.  NEURO: Grossly intact.      PROBLEMS:   1.  History of ER positive breast cancer on extended therapy since about 02/2006. OncotypeDx score = 7  2.  Textured breast implants - recent revision of the left breast  "implant  3.  Low bone density/Osteoporosis on outside scan   4. H/O GERD worse with emesis episode noted above  5. Intermittent SOB  6. Lower lip swelling - uncertain etiology - diagnosed as \"burning mouth syndrome\"  7. Diagnosis of enterococcus soft tissue infection of lip        IMPRESSION:   As mentioned she has main concern of her lower lip symptoms.  She will follow-up with her rheumatologist regarding that.  She did mention that her rheumatologist felt that aromatase inhibitors and tamoxifen would cause the same biological effects.  I explained to her that aromatase inhibitors lower blood estrogen levels while tamoxifen tends to be weak estrogen and many organ sites.  While biologically they have the ability to block estrogen receptor function and breast tissue this is not the case for most peripheral organs while the aromatase inhibitors decrease total body estrogen tamoxifen is an estrogen and some organs.    PLAN:   1. Continue off of systemic therapy.   2. RTC in 6 months  3. Contact us sooner as necessary.  4. Prescription given for prophylactic paxlovid for upcoming trip.     Lefty Matthew MD        "

## 2023-04-07 NOTE — NURSING NOTE
"Oncology Rooming Note    April 7, 2023 10:12 AM   Abigail Lam is a 67 year old female who presents for:    Chief Complaint   Patient presents with     Oncology Clinic Visit     Return- breast cancer     Initial Vitals: BP (!) 80/56 (BP Location: Right arm, Patient Position: Sitting, Cuff Size: Adult Small)   Pulse 89   Temp 97.4  F (36.3  C) (Oral)   Resp 16   Wt 43.4 kg (95 lb 11.2 oz)   SpO2 98%   BMI 17.50 kg/m   Estimated body mass index is 17.5 kg/m  as calculated from the following:    Height as of 12/16/22: 1.575 m (5' 2\").    Weight as of this encounter: 43.4 kg (95 lb 11.2 oz). Body surface area is 1.38 meters squared.  No Pain (0) Comment: Data Unavailable   No LMP recorded. Patient is postmenopausal.  Allergies reviewed: Yes  Medications reviewed: Yes    Medications: Medication refills not needed today.  Pharmacy name entered into [employer] Apart:    [id number] [place], [place] - [address] [facility] [place], [place] - [address] [id number] [place], [place] - [address] [facility]  [id number] [place], [place] - [address] [facility]    Clinical concerns: Has been experiencing some lip swelling that she wants to discuss with provider.      Jessica Watts            "

## 2023-10-13 NOTE — NURSING NOTE
Is the patient currently in the state of [place]? YES    Visit mode:VIDEO    If the visit is dropped, the patient can be reconnected by: VIDEO VISIT: Send to e-mail at: [email or web address]    Will anyone else be joining the visit? NO  (If patient encounters technical issues they should call [phone]    How would you like to obtain your AVS? MyChart    Are changes needed to the allergy or medication list?  Pt states there are multiple duplicates on her med list but other than that everything was up-to-date during echeck-in.    Reason for visit: RECHECK    Jose HENLEY

## 2023-10-13 NOTE — LETTER
10/13/2023         RE: Abigail Lam  [address] [facility] [address]        Dear Colleague,    Thank you for referring your patient, Abigail Lam, to the [facility]. Please see a copy of my visit note below.    Virtual Visit Details    Type of service:  Video Visit   Video Start Time: 12:22 PM  Video End Time: 12:35    Originating Location (pt. Location): Home    Distant Location (provider location):  On-site  Platform used for Video Visit: Denise    DIAGNOSIS: Ms. Lam is a 67 year old woman with a history of Stage I, node negative, breast cancer.  It was multifocal.  ER, [place] positive, grade 1, negative for angiolymphatic invasion. One tumor was 2 cm, another was 0.5.  Had a low Oncotype score of 7.  She was treated with bilateral mastectomy, oophorectomy, started Tamoxifen in 2006 and took it for 5 years.  She has now been on extended endocrine therapy with Arimidex since June 2011.  She had been receiving q 6 month zolendronate but this was stopped Dec 2013 as it might have been causing her rib musculskeletal symptoms.  She switched to tamoxifen in June 2017. She stopped tamoxifen in December 2021.     INTERIM HISTORY:  Ms. Lam returns for a regularly scheduled visit.  Since we last saw her she has had several events.  As noted she had a tooth extracted previously but as it turns out this was the wrong tooth and had to have another one extracted.  She is having an implant eventually placed.    She is also notes an association between urinary tract infections and her swollen lip.  She states for the past several UTIs that she has had once they were treated by antibiotics her lip symptoms improved. Of note is that the discontinuation of tamoxifen did not affect her lip.    Otherwise there are no new symptoms.  He does complain of some lower back pain which has been chronic and intermittent.  I told her today that I thought that the risk of breast cancer was very small  "but she wanted to ensure that her back pain was not associated with any increased breast cancer recurrence.  I told her this was unlikely we will get a chest x-ray.    Review of systems is otherwise negative.  She has URI symptoms that are mild but she decided to make this a virtual visit.  In addition she thinks she got this from her recent visit to her grandchildren.    SOCIAL HISTORY: She took a [place] and [place].  They are going to [place] in 2 weeks.       PHYSICAL EXAMINATION:  She looked well over the video call    PROBLEMS:   1.  History of ER positive breast cancer on extended therapy since about 02/2006. OncotypeDx score = 7  2.  Textured breast implants - recent revision of the left breast implant  3.  Low bone density/Osteoporosis on outside scan   4. H/O GERD worse with emesis episode noted above  5. Intermittent SOB  6. Lower lip swelling - uncertain etiology - diagnosed as \"burning mouth syndrome\"  7. Diagnosis of enterococcus soft tissue infection of lip  8. Low back pain        IMPRESSION:   Her main potential symptoms that could be associated breast cancers and low back pain.  We will get some routine x-rays.  I also told her that now that she is off any drug I could see her annually.  She will is a little bit discomforted by this but on the other hand I told her I would be available if things came up in the meantime.  Most of her medical concerns are managed by other providers.    PLAN:   1. Continue off of systemic therapy.   2. RTC in 12 months  3. Lumbar spine films schedule and will check.  4. Contact us sooner as necessary.     Lefty Matthew MD    "

## 2023-10-13 NOTE — PROGRESS NOTES
Virtual Visit Details    Type of service:  Video Visit   Video Start Time: 12:22 PM  Video End Time: 12:35    Originating Location (pt. Location): Home    Distant Location (provider location):  On-site  Platform used for Video Visit: Denise    DIAGNOSIS: Ms. Lam is a 67 year old woman with a history of Stage I, node negative, breast cancer.  It was multifocal.  ER, [place] positive, grade 1, negative for angiolymphatic invasion. One tumor was 2 cm, another was 0.5.  Had a low Oncotype score of 7.  She was treated with bilateral mastectomy, oophorectomy, started Tamoxifen in 2006 and took it for 5 years.  She has now been on extended endocrine therapy with Arimidex since June 2011.  She had been receiving q 6 month zolendronate but this was stopped Dec 2013 as it might have been causing her rib musculskeletal symptoms.  She switched to tamoxifen in June 2017. She stopped tamoxifen in December 2021.     INTERIM HISTORY:  Ms. Lam returns for a regularly scheduled visit.  Since we last saw her she has had several events.  As noted she had a tooth extracted previously but as it turns out this was the wrong tooth and had to have another one extracted.  She is having an implant eventually placed.    She is also notes an association between urinary tract infections and her swollen lip.  She states for the past several UTIs that she has had once they were treated by antibiotics her lip symptoms improved. Of note is that the discontinuation of tamoxifen did not affect her lip.    Otherwise there are no new symptoms.  He does complain of some lower back pain which has been chronic and intermittent.  I told her today that I thought that the risk of breast cancer was very small but she wanted to ensure that her back pain was not associated with any increased breast cancer recurrence.  I told her this was unlikely we will get a chest x-ray.    Review of systems is otherwise negative.  She has URI symptoms that are mild but she  "decided to make this a virtual visit.  In addition she thinks she got this from her recent visit to her grandchildren.    SOCIAL HISTORY: She took a [place] and [place].  They are going to [place] in 2 weeks.       PHYSICAL EXAMINATION:  She looked well over the video call    PROBLEMS:   1.  History of ER positive breast cancer on extended therapy since about 02/2006. OncotypeDx score = 7  2.  Textured breast implants - recent revision of the left breast implant  3.  Low bone density/Osteoporosis on outside scan   4. H/O GERD worse with emesis episode noted above  5. Intermittent SOB  6. Lower lip swelling - uncertain etiology - diagnosed as \"burning mouth syndrome\"  7. Diagnosis of enterococcus soft tissue infection of lip  8. Low back pain        IMPRESSION:   Her main potential symptoms that could be associated breast cancers and low back pain.  We will get some routine x-rays.  I also told her that now that she is off any drug I could see her annually.  She will is a little bit discomforted by this but on the other hand I told her I would be available if things came up in the meantime.  Most of her medical concerns are managed by other providers.    PLAN:   1. Continue off of systemic therapy.   2. RTC in 12 months  3. Lumbar spine films schedule and will check.  4. Contact us sooner as necessary.     Lefty Matthew MD        "

## 2023-11-17 NOTE — TELEPHONE ENCOUNTER
Called patient to follow up on symptoms sent through a [employer] message on 11/17/23. Left a voicemail message requesting the patient call [phone], option 5, option 2 and speak with any Triage nurse available.

## 2023-11-20 NOTE — TELEPHONE ENCOUNTER
Oncology Nurse Triage    Situation:   Abigail reporting the following symptoms:  - concern for bulging neck vein    Background:   Treating Provider:   Dr. Matthew    Date of last office visit: 10/13/2023 Dr. mathtew    Recent Treatments:copied and pasted from plan notes from 10/13      Assessment:     Pt is in [place], went to local Urgent Care and is having a ultrasound tomorrow to make sure femoral arteries etc are doing okay.    Appeared like jugular vein peaking out, mainly happens when pt talks.     Teeth issues contributing pt's symptoms may not resolve for a couple of months.     Recommendations:   Pt initially sent MyChart last week Friday because Dr. Matthew is so helpful and was looking for reassurance if should go to Urgent Care. But pt decided to go to  anyhow. Pt will continue to follow up with providers in [place] on symptoms.  Pt thanked care team for following up to check on status of pt and appreciates the call.   If Dr. Matthew has any second opinion okay for him to send a MyChart.

## 2023-11-20 NOTE — TELEPHONE ENCOUNTER
Called patient to follow up on symptoms sent through a [employer] message on 11/20/23. Left a voicemail message requesting the patient call [phone], option 5, option 2 and speak with any Triage nurse available.

## 2024-01-01 ENCOUNTER — ONCOLOGY VISIT (OUTPATIENT)
Dept: ONCOLOGY | Facility: CLINIC | Age: 69
End: 2024-01-01
Attending: INTERNAL MEDICINE
Payer: MEDICARE

## 2024-01-01 ENCOUNTER — TELEPHONE (OUTPATIENT)
Dept: PALLIATIVE CARE | Facility: CLINIC | Age: 69
End: 2024-01-01

## 2024-01-01 ENCOUNTER — APPOINTMENT (OUTPATIENT)
Dept: MEDSURG UNIT | Facility: CLINIC | Age: 69
End: 2024-01-01
Attending: STUDENT IN AN ORGANIZED HEALTH CARE EDUCATION/TRAINING PROGRAM
Payer: MEDICARE

## 2024-01-01 ENCOUNTER — HOSPITAL ENCOUNTER (OUTPATIENT)
Facility: CLINIC | Age: 69
Discharge: HOME | End: 2024-03-12
Attending: STUDENT IN AN ORGANIZED HEALTH CARE EDUCATION/TRAINING PROGRAM | Admitting: STUDENT IN AN ORGANIZED HEALTH CARE EDUCATION/TRAINING PROGRAM
Payer: MEDICARE

## 2024-01-01 ENCOUNTER — PATIENT OUTREACH (OUTPATIENT)
Dept: GASTROENTEROLOGY | Facility: CLINIC | Age: 69
End: 2024-01-01

## 2024-01-01 ENCOUNTER — APPOINTMENT (OUTPATIENT)
Dept: PHYSICAL THERAPY | Facility: CLINIC | Age: 69
DRG: 176 | End: 2024-01-01
Payer: MEDICARE

## 2024-01-01 ENCOUNTER — ONCOLOGY VISIT (OUTPATIENT)
Dept: PALLIATIVE CARE | Facility: CLINIC | Age: 69
End: 2024-01-01
Attending: INTERNAL MEDICINE
Payer: MEDICARE

## 2024-01-01 ENCOUNTER — MYC MEDICAL ADVICE (OUTPATIENT)
Dept: INTERVENTIONAL RADIOLOGY/VASCULAR | Facility: CLINIC | Age: 69
End: 2024-01-01
Payer: MEDICARE

## 2024-01-01 ENCOUNTER — TUMOR CONFERENCE (OUTPATIENT)
Dept: ONCOLOGY | Facility: CLINIC | Age: 69
End: 2024-01-01
Payer: MEDICARE

## 2024-01-01 ENCOUNTER — ANCILLARY PROCEDURE (OUTPATIENT)
Dept: GENERAL RADIOLOGY | Facility: CLINIC | Age: 69
End: 2024-01-01
Attending: INTERNAL MEDICINE
Payer: MEDICARE

## 2024-01-01 ENCOUNTER — PRE VISIT (OUTPATIENT)
Dept: ONCOLOGY | Facility: CLINIC | Age: 69
End: 2024-01-01

## 2024-01-01 ENCOUNTER — LAB (OUTPATIENT)
Dept: LAB | Facility: CLINIC | Age: 69
End: 2024-01-01
Payer: MEDICARE

## 2024-01-01 ENCOUNTER — VIRTUAL VISIT (OUTPATIENT)
Dept: ONCOLOGY | Facility: CLINIC | Age: 69
End: 2024-01-01
Attending: STUDENT IN AN ORGANIZED HEALTH CARE EDUCATION/TRAINING PROGRAM
Payer: MEDICARE

## 2024-01-01 ENCOUNTER — INFUSION THERAPY VISIT (OUTPATIENT)
Dept: TRANSPLANT | Facility: CLINIC | Age: 69
End: 2024-01-01
Attending: STUDENT IN AN ORGANIZED HEALTH CARE EDUCATION/TRAINING PROGRAM
Payer: MEDICARE

## 2024-01-01 ENCOUNTER — APPOINTMENT (OUTPATIENT)
Dept: CARDIOLOGY | Facility: CLINIC | Age: 69
DRG: 176 | End: 2024-01-01
Attending: STUDENT IN AN ORGANIZED HEALTH CARE EDUCATION/TRAINING PROGRAM
Payer: MEDICARE

## 2024-01-01 ENCOUNTER — APPOINTMENT (OUTPATIENT)
Dept: CT IMAGING | Facility: CLINIC | Age: 69
DRG: 176 | End: 2024-01-01
Attending: EMERGENCY MEDICINE
Payer: MEDICARE

## 2024-01-01 ENCOUNTER — ANCILLARY PROCEDURE (OUTPATIENT)
Dept: CT IMAGING | Facility: CLINIC | Age: 69
End: 2024-01-01
Attending: INTERNAL MEDICINE
Payer: MEDICARE

## 2024-01-01 ENCOUNTER — NURSE TRIAGE (OUTPATIENT)
Dept: NURSING | Facility: CLINIC | Age: 69
End: 2024-01-01
Payer: MEDICARE

## 2024-01-01 ENCOUNTER — PATIENT OUTREACH (OUTPATIENT)
Dept: ONCOLOGY | Facility: CLINIC | Age: 69
End: 2024-01-01

## 2024-01-01 ENCOUNTER — TELEPHONE (OUTPATIENT)
Dept: ONCOLOGY | Facility: CLINIC | Age: 69
End: 2024-01-01
Payer: MEDICARE

## 2024-01-01 ENCOUNTER — TELEPHONE (OUTPATIENT)
Dept: GASTROENTEROLOGY | Facility: CLINIC | Age: 69
End: 2024-01-01

## 2024-01-01 ENCOUNTER — LAB (OUTPATIENT)
Dept: LAB | Facility: CLINIC | Age: 69
End: 2024-01-01
Attending: INTERNAL MEDICINE
Payer: MEDICARE

## 2024-01-01 ENCOUNTER — TELEPHONE (OUTPATIENT)
Dept: PALLIATIVE CARE | Facility: CLINIC | Age: 69
End: 2024-01-01
Payer: MEDICARE

## 2024-01-01 ENCOUNTER — HOSPITAL ENCOUNTER (OUTPATIENT)
Dept: PET IMAGING | Facility: HOSPITAL | Age: 69
Discharge: HOME | End: 2024-03-15
Attending: INTERNAL MEDICINE
Payer: MEDICARE

## 2024-01-01 ENCOUNTER — HOSPITAL ENCOUNTER (OUTPATIENT)
Facility: CLINIC | Age: 69
End: 2024-01-01
Attending: INTERNAL MEDICINE | Admitting: INTERNAL MEDICINE
Payer: MEDICARE

## 2024-01-01 ENCOUNTER — PATIENT OUTREACH (OUTPATIENT)
Dept: CARE COORDINATION | Facility: CLINIC | Age: 69
End: 2024-01-01
Payer: MEDICARE

## 2024-01-01 ENCOUNTER — PATIENT OUTREACH (OUTPATIENT)
Dept: ONCOLOGY | Facility: CLINIC | Age: 69
End: 2024-01-01
Payer: MEDICARE

## 2024-01-01 ENCOUNTER — APPOINTMENT (OUTPATIENT)
Dept: GENERAL RADIOLOGY | Facility: CLINIC | Age: 69
DRG: 176 | End: 2024-01-01
Attending: EMERGENCY MEDICINE
Payer: MEDICARE

## 2024-01-01 ENCOUNTER — NURSE TRIAGE (OUTPATIENT)
Dept: ONCOLOGY | Facility: CLINIC | Age: 69
End: 2024-01-01

## 2024-01-01 ENCOUNTER — APPOINTMENT (OUTPATIENT)
Dept: INTERVENTIONAL RADIOLOGY/VASCULAR | Facility: CLINIC | Age: 69
End: 2024-01-01
Attending: INTERNAL MEDICINE
Payer: MEDICARE

## 2024-01-01 ENCOUNTER — HOSPITAL ENCOUNTER (INPATIENT)
Facility: CLINIC | Age: 69
LOS: 2 days | Discharge: HOME | DRG: 176 | End: 2024-03-26
Attending: EMERGENCY MEDICINE | Admitting: STUDENT IN AN ORGANIZED HEALTH CARE EDUCATION/TRAINING PROGRAM
Payer: MEDICARE

## 2024-01-01 VITALS
TEMPERATURE: 97.7 F | WEIGHT: 86.1 LBS | BODY MASS INDEX: 16.25 KG/M2 | RESPIRATION RATE: 16 BRPM | SYSTOLIC BLOOD PRESSURE: 160 MMHG | OXYGEN SATURATION: 98 % | HEIGHT: 61 IN | DIASTOLIC BLOOD PRESSURE: 95 MMHG | HEART RATE: 102 BPM

## 2024-01-01 VITALS
RESPIRATION RATE: 14 BRPM | HEART RATE: 95 BPM | DIASTOLIC BLOOD PRESSURE: 100 MMHG | SYSTOLIC BLOOD PRESSURE: 170 MMHG | WEIGHT: 89.5 LBS | TEMPERATURE: 97.7 F | BODY MASS INDEX: 16.37 KG/M2 | OXYGEN SATURATION: 99 %

## 2024-01-01 VITALS
RESPIRATION RATE: 16 BRPM | OXYGEN SATURATION: 99 % | BODY MASS INDEX: 16.33 KG/M2 | WEIGHT: 89.29 LBS | DIASTOLIC BLOOD PRESSURE: 86 MMHG | HEART RATE: 79 BPM | TEMPERATURE: 97.8 F | SYSTOLIC BLOOD PRESSURE: 146 MMHG

## 2024-01-01 VITALS
HEIGHT: 61 IN | BODY MASS INDEX: 16.48 KG/M2 | HEART RATE: 99 BPM | OXYGEN SATURATION: 99 % | WEIGHT: 87.3 LBS | TEMPERATURE: 98.5 F | RESPIRATION RATE: 16 BRPM | DIASTOLIC BLOOD PRESSURE: 79 MMHG | SYSTOLIC BLOOD PRESSURE: 144 MMHG

## 2024-01-01 VITALS
HEART RATE: 99 BPM | HEIGHT: 61 IN | BODY MASS INDEX: 16.46 KG/M2 | WEIGHT: 87.2 LBS | DIASTOLIC BLOOD PRESSURE: 79 MMHG | TEMPERATURE: 98.5 F | SYSTOLIC BLOOD PRESSURE: 144 MMHG | OXYGEN SATURATION: 99 % | RESPIRATION RATE: 16 BRPM

## 2024-01-01 VITALS
RESPIRATION RATE: 16 BRPM | DIASTOLIC BLOOD PRESSURE: 78 MMHG | TEMPERATURE: 98 F | SYSTOLIC BLOOD PRESSURE: 168 MMHG | HEART RATE: 91 BPM | WEIGHT: 87 LBS | BODY MASS INDEX: 16.01 KG/M2 | OXYGEN SATURATION: 97 % | HEIGHT: 62 IN

## 2024-01-01 VITALS — WEIGHT: 87 LBS | HEIGHT: 61 IN | BODY MASS INDEX: 16.42 KG/M2

## 2024-01-01 VITALS
DIASTOLIC BLOOD PRESSURE: 75 MMHG | HEART RATE: 98 BPM | RESPIRATION RATE: 16 BRPM | TEMPERATURE: 97.1 F | HEIGHT: 61 IN | SYSTOLIC BLOOD PRESSURE: 147 MMHG | WEIGHT: 85.6 LBS | BODY MASS INDEX: 16.16 KG/M2 | OXYGEN SATURATION: 95 %

## 2024-01-01 VITALS
OXYGEN SATURATION: 99 % | SYSTOLIC BLOOD PRESSURE: 135 MMHG | DIASTOLIC BLOOD PRESSURE: 84 MMHG | TEMPERATURE: 97.9 F | HEART RATE: 98 BPM | RESPIRATION RATE: 16 BRPM

## 2024-01-01 VITALS
TEMPERATURE: 97.8 F | WEIGHT: 83.3 LBS | HEART RATE: 110 BPM | RESPIRATION RATE: 16 BRPM | BODY MASS INDEX: 15.49 KG/M2 | OXYGEN SATURATION: 100 % | SYSTOLIC BLOOD PRESSURE: 142 MMHG | DIASTOLIC BLOOD PRESSURE: 83 MMHG

## 2024-01-01 DIAGNOSIS — K86.89 PANCREATIC MASS: Primary | ICD-10-CM

## 2024-01-01 DIAGNOSIS — R63.0 ANOREXIA SYMPTOM: ICD-10-CM

## 2024-01-01 DIAGNOSIS — Z17.0 MALIGNANT NEOPLASM OF CENTRAL PORTION OF LEFT BREAST IN FEMALE, ESTROGEN RECEPTOR POSITIVE (H): ICD-10-CM

## 2024-01-01 DIAGNOSIS — K59.03 DRUG-INDUCED CONSTIPATION: ICD-10-CM

## 2024-01-01 DIAGNOSIS — C25.1 MALIGNANT NEOPLASM OF BODY OF PANCREAS (H): ICD-10-CM

## 2024-01-01 DIAGNOSIS — R53.1 WEAKNESS: ICD-10-CM

## 2024-01-01 DIAGNOSIS — R11.0 NAUSEA: ICD-10-CM

## 2024-01-01 DIAGNOSIS — R07.89 OTHER CHEST PAIN: ICD-10-CM

## 2024-01-01 DIAGNOSIS — G89.3 NEOPLASM RELATED PAIN: ICD-10-CM

## 2024-01-01 DIAGNOSIS — C50.112 MALIGNANT NEOPLASM OF CENTRAL PORTION OF LEFT BREAST IN FEMALE, ESTROGEN RECEPTOR POSITIVE (H): ICD-10-CM

## 2024-01-01 DIAGNOSIS — C79.9 METASTASIS FROM PANCREATIC CANCER (H): ICD-10-CM

## 2024-01-01 DIAGNOSIS — Z17.0 MALIGNANT NEOPLASM OF CENTRAL PORTION OF RIGHT BREAST IN FEMALE, ESTROGEN RECEPTOR POSITIVE (H): ICD-10-CM

## 2024-01-01 DIAGNOSIS — C25.1 MALIGNANT NEOPLASM OF BODY OF PANCREAS (H): Primary | ICD-10-CM

## 2024-01-01 DIAGNOSIS — C50.811 MALIGNANT NEOPLASM OF OVERLAPPING SITES OF RIGHT BREAST IN FEMALE, ESTROGEN RECEPTOR POSITIVE (H): ICD-10-CM

## 2024-01-01 DIAGNOSIS — C78.7 PANCREATIC CANCER METASTASIZED TO LIVER (H): Primary | ICD-10-CM

## 2024-01-01 DIAGNOSIS — K86.89 PANCREATIC MASS: ICD-10-CM

## 2024-01-01 DIAGNOSIS — C25.9 MALIGNANT NEOPLASM OF PANCREAS, UNSPECIFIED LOCATION OF MALIGNANCY (H): ICD-10-CM

## 2024-01-01 DIAGNOSIS — C50.111 MALIGNANT NEOPLASM OF CENTRAL PORTION OF RIGHT BREAST IN FEMALE, ESTROGEN RECEPTOR POSITIVE (H): ICD-10-CM

## 2024-01-01 DIAGNOSIS — R07.89 OTHER CHEST PAIN: Primary | ICD-10-CM

## 2024-01-01 DIAGNOSIS — R10.9 ABDOMINAL PAIN, UNSPECIFIED ABDOMINAL LOCATION: ICD-10-CM

## 2024-01-01 DIAGNOSIS — D37.9 NEOPLASM OF UNCERTAIN BEHAVIOR OF DIGESTIVE ORGAN, UNSPECIFIED: ICD-10-CM

## 2024-01-01 DIAGNOSIS — C25.9 PANCREATIC CANCER METASTASIZED TO LIVER (H): ICD-10-CM

## 2024-01-01 DIAGNOSIS — Z85.3 PERSONAL HISTORY OF MALIGNANT NEOPLASM OF BREAST: ICD-10-CM

## 2024-01-01 DIAGNOSIS — C25.9 PANCREATIC CANCER METASTASIZED TO LIVER (H): Primary | ICD-10-CM

## 2024-01-01 DIAGNOSIS — C78.7 PANCREATIC CANCER METASTASIZED TO LIVER (H): ICD-10-CM

## 2024-01-01 DIAGNOSIS — Z80.0 FAMILY HISTORY OF PANCREATIC CANCER: ICD-10-CM

## 2024-01-01 DIAGNOSIS — C25.9 PANCREAS CANCER (H): Primary | ICD-10-CM

## 2024-01-01 DIAGNOSIS — E86.0 DEHYDRATION: ICD-10-CM

## 2024-01-01 DIAGNOSIS — R10.816 EPIGASTRIC ABDOMINAL TENDERNESS, REBOUND TENDERNESS PRESENCE NOT SPECIFIED: Primary | ICD-10-CM

## 2024-01-01 DIAGNOSIS — C25.9 METASTASIS FROM PANCREATIC CANCER (H): ICD-10-CM

## 2024-01-01 DIAGNOSIS — R06.02 SHORTNESS OF BREATH: ICD-10-CM

## 2024-01-01 DIAGNOSIS — R07.9 CHEST PAIN, UNSPECIFIED TYPE: ICD-10-CM

## 2024-01-01 DIAGNOSIS — K59.00 CONSTIPATION, UNSPECIFIED CONSTIPATION TYPE: ICD-10-CM

## 2024-01-01 DIAGNOSIS — Z80.3 FAMILY HISTORY OF MALIGNANT NEOPLASM OF BREAST: ICD-10-CM

## 2024-01-01 DIAGNOSIS — F41.9 ANXIETY: ICD-10-CM

## 2024-01-01 DIAGNOSIS — I26.99 OTHER ACUTE PULMONARY EMBOLISM, UNSPECIFIED WHETHER ACUTE COR PULMONALE PRESENT (H): ICD-10-CM

## 2024-01-01 DIAGNOSIS — Z17.0 MALIGNANT NEOPLASM OF OVERLAPPING SITES OF RIGHT BREAST IN FEMALE, ESTROGEN RECEPTOR POSITIVE (H): ICD-10-CM

## 2024-01-01 DIAGNOSIS — Z85.3 PERSONAL HISTORY OF MALIGNANT NEOPLASM OF BREAST: Primary | ICD-10-CM

## 2024-01-01 DIAGNOSIS — G89.3 NEOPLASM RELATED PAIN: Primary | ICD-10-CM

## 2024-01-01 LAB
ALBUMIN SERPL BCG-MCNC: 3.5 G/DL (ref 3.5–5.2)
ALBUMIN SERPL BCG-MCNC: 3.7 G/DL (ref 3.5–5.2)
ALBUMIN SERPL BCG-MCNC: 3.8 G/DL (ref 3.5–5.2)
ALBUMIN SERPL BCG-MCNC: 4.2 G/DL (ref 3.5–5.2)
ALBUMIN UR-MCNC: 50 MG/DL
ALP SERPL-CCNC: 384 U/L (ref 40–150)
ALP SERPL-CCNC: 425 U/L (ref 40–150)
ALP SERPL-CCNC: 429 U/L (ref 40–150)
ALP SERPL-CCNC: 454 U/L (ref 40–150)
ALT SERPL W P-5'-P-CCNC: 23 U/L (ref 0–50)
ALT SERPL W P-5'-P-CCNC: 23 U/L (ref 0–50)
ALT SERPL W P-5'-P-CCNC: 27 U/L (ref 0–50)
ALT SERPL W P-5'-P-CCNC: 29 U/L (ref 0–50)
ANION GAP SERPL CALCULATED.3IONS-SCNC: 11 MMOL/L (ref 7–15)
ANION GAP SERPL CALCULATED.3IONS-SCNC: 12 MMOL/L (ref 7–15)
ANION GAP SERPL CALCULATED.3IONS-SCNC: 13 MMOL/L (ref 7–15)
ANION GAP SERPL CALCULATED.3IONS-SCNC: 13 MMOL/L (ref 7–15)
APPEARANCE UR: ABNORMAL
APTT PPP: 104 SECONDS (ref 22–38)
AST SERPL W P-5'-P-CCNC: 57 U/L (ref 0–45)
AST SERPL W P-5'-P-CCNC: 61 U/L (ref 0–45)
AST SERPL W P-5'-P-CCNC: 65 U/L (ref 0–45)
AST SERPL W P-5'-P-CCNC: 79 U/L (ref 0–45)
ATRIAL RATE - MUSE: 86 BPM
ATRIAL RATE - MUSE: 87 BPM
ATRIAL RATE - MUSE: 87 BPM
BACTERIA #/AREA URNS HPF: ABNORMAL /HPF
BACTERIA BLD CULT: NO GROWTH
BACTERIA BLD CULT: NO GROWTH
BASOPHILS # BLD AUTO: 0.1 10E3/UL (ref 0–0.2)
BASOPHILS NFR BLD AUTO: 1 %
BILIRUB SERPL-MCNC: 0.7 MG/DL
BILIRUB SERPL-MCNC: 0.8 MG/DL
BILIRUB UR QL STRIP: ABNORMAL
BUN SERPL-MCNC: 6.1 MG/DL (ref 8–23)
BUN SERPL-MCNC: 7.8 MG/DL (ref 8–23)
BUN SERPL-MCNC: 8.3 MG/DL (ref 8–23)
BUN SERPL-MCNC: 8.5 MG/DL (ref 8–23)
CALCIUM SERPL-MCNC: 8.4 MG/DL (ref 8.8–10.2)
CALCIUM SERPL-MCNC: 9 MG/DL (ref 8.8–10.2)
CALCIUM SERPL-MCNC: 9 MG/DL (ref 8.8–10.2)
CALCIUM SERPL-MCNC: 9.2 MG/DL (ref 8.8–10.2)
CANCER AG15-3 SERPL-ACNC: 104 U/ML
CANCER AG19-9 SERPL IA-ACNC: ABNORMAL U/ML
CEA SERPL-MCNC: 38.8 NG/ML
CHLORIDE SERPL-SCNC: 101 MMOL/L (ref 98–107)
CHLORIDE SERPL-SCNC: 95 MMOL/L (ref 98–107)
CHLORIDE SERPL-SCNC: 95 MMOL/L (ref 98–107)
CHLORIDE SERPL-SCNC: 99 MMOL/L (ref 98–107)
COLOR UR AUTO: YELLOW
CREAT SERPL-MCNC: 0.52 MG/DL (ref 0.51–0.95)
CREAT SERPL-MCNC: 0.54 MG/DL (ref 0.51–0.95)
CREAT SERPL-MCNC: 0.56 MG/DL (ref 0.51–0.95)
CREAT SERPL-MCNC: 0.62 MG/DL (ref 0.51–0.95)
D DIMER PPP FEU-MCNC: >20 UG/ML FEU (ref 0–0.5)
DEPRECATED HCO3 PLAS-SCNC: 22 MMOL/L (ref 22–29)
DEPRECATED HCO3 PLAS-SCNC: 23 MMOL/L (ref 22–29)
DEPRECATED HCO3 PLAS-SCNC: 26 MMOL/L (ref 22–29)
DEPRECATED HCO3 PLAS-SCNC: 29 MMOL/L (ref 22–29)
DIASTOLIC BLOOD PRESSURE - MUSE: NORMAL MMHG
EGFRCR SERPLBLD CKD-EPI 2021: >90 ML/MIN/1.73M2
EOSINOPHIL # BLD AUTO: 0.8 10E3/UL (ref 0–0.7)
EOSINOPHIL # BLD AUTO: 0.8 10E3/UL (ref 0–0.7)
EOSINOPHIL # BLD AUTO: 1 10E3/UL (ref 0–0.7)
EOSINOPHIL NFR BLD AUTO: 4 %
EOSINOPHIL NFR BLD AUTO: 5 %
EOSINOPHIL NFR BLD AUTO: 6 %
ERYTHROCYTE [DISTWIDTH] IN BLOOD BY AUTOMATED COUNT: 12.5 % (ref 10–15)
ERYTHROCYTE [DISTWIDTH] IN BLOOD BY AUTOMATED COUNT: 12.6 % (ref 10–15)
ERYTHROCYTE [DISTWIDTH] IN BLOOD BY AUTOMATED COUNT: 12.7 % (ref 10–15)
ERYTHROCYTE [DISTWIDTH] IN BLOOD BY AUTOMATED COUNT: 12.8 % (ref 10–15)
ERYTHROCYTE [DISTWIDTH] IN BLOOD BY AUTOMATED COUNT: 12.9 % (ref 10–15)
ERYTHROCYTE [DISTWIDTH] IN BLOOD BY AUTOMATED COUNT: 13 % (ref 10–15)
GLUCOSE BLDC GLUCOMTR-MCNC: 111 MG/DL (ref 70–99)
GLUCOSE SERPL-MCNC: 105 MG/DL (ref 70–99)
GLUCOSE SERPL-MCNC: 112 MG/DL (ref 70–99)
GLUCOSE SERPL-MCNC: 160 MG/DL (ref 70–99)
GLUCOSE SERPL-MCNC: 87 MG/DL (ref 70–99)
GLUCOSE UR STRIP-MCNC: NEGATIVE MG/DL
HCT VFR BLD AUTO: 34.3 % (ref 35–47)
HCT VFR BLD AUTO: 36.1 % (ref 35–47)
HCT VFR BLD AUTO: 37.4 % (ref 35–47)
HCT VFR BLD AUTO: 37.6 % (ref 35–47)
HCT VFR BLD AUTO: 37.7 % (ref 35–47)
HCT VFR BLD AUTO: 38 % (ref 35–47)
HGB BLD-MCNC: 11.4 G/DL (ref 11.7–15.7)
HGB BLD-MCNC: 12 G/DL (ref 11.7–15.7)
HGB BLD-MCNC: 12.4 G/DL (ref 11.7–15.7)
HGB BLD-MCNC: 12.8 G/DL (ref 11.7–15.7)
HGB BLD-MCNC: 12.8 G/DL (ref 11.7–15.7)
HGB BLD-MCNC: 13 G/DL (ref 11.7–15.7)
HGB UR QL STRIP: NEGATIVE
HYALINE CASTS: 6 /LPF
IMM GRANULOCYTES # BLD: 0.1 10E3/UL
IMM GRANULOCYTES # BLD: 0.2 10E3/UL
IMM GRANULOCYTES # BLD: 0.2 10E3/UL
IMM GRANULOCYTES NFR BLD: 1 %
INR PPP: 1.19 (ref 0.85–1.15)
INR PPP: 1.45 (ref 0.85–1.15)
INTERPRETATION ECG - MUSE: NORMAL
INTERPRETATION: NORMAL
KETONES UR STRIP-MCNC: ABNORMAL MG/DL
LACTATE SERPL-SCNC: 1.6 MMOL/L (ref 0.7–2)
LDH SERPL L TO P-CCNC: 401 U/L (ref 0–250)
LEUKOCYTE ESTERASE UR QL STRIP: ABNORMAL
LVEF ECHO: NORMAL
LYMPHOCYTES # BLD AUTO: 1.7 10E3/UL (ref 0.8–5.3)
LYMPHOCYTES # BLD AUTO: 2.1 10E3/UL (ref 0.8–5.3)
LYMPHOCYTES # BLD AUTO: 2.6 10E3/UL (ref 0.8–5.3)
LYMPHOCYTES NFR BLD AUTO: 10 %
LYMPHOCYTES NFR BLD AUTO: 11 %
LYMPHOCYTES NFR BLD AUTO: 16 %
MAGNESIUM SERPL-MCNC: 1.8 MG/DL (ref 1.7–2.3)
MCH RBC QN AUTO: 30.5 PG (ref 26.5–33)
MCH RBC QN AUTO: 30.9 PG (ref 26.5–33)
MCH RBC QN AUTO: 31.1 PG (ref 26.5–33)
MCH RBC QN AUTO: 31.2 PG (ref 26.5–33)
MCH RBC QN AUTO: 31.2 PG (ref 26.5–33)
MCH RBC QN AUTO: 31.6 PG (ref 26.5–33)
MCHC RBC AUTO-ENTMCNC: 33.2 G/DL (ref 31.5–36.5)
MCHC RBC AUTO-ENTMCNC: 34 G/DL (ref 31.5–36.5)
MCHC RBC AUTO-ENTMCNC: 34 G/DL (ref 31.5–36.5)
MCHC RBC AUTO-ENTMCNC: 34.2 G/DL (ref 31.5–36.5)
MCV RBC AUTO: 91 FL (ref 78–100)
MCV RBC AUTO: 91 FL (ref 78–100)
MCV RBC AUTO: 92 FL (ref 78–100)
MCV RBC AUTO: 92 FL (ref 78–100)
MCV RBC AUTO: 94 FL (ref 78–100)
MCV RBC AUTO: 95 FL (ref 78–100)
MONOCYTES # BLD AUTO: 1.2 10E3/UL (ref 0–1.3)
MONOCYTES # BLD AUTO: 1.6 10E3/UL (ref 0–1.3)
MONOCYTES # BLD AUTO: 1.7 10E3/UL (ref 0–1.3)
MONOCYTES NFR BLD AUTO: 10 %
MONOCYTES NFR BLD AUTO: 8 %
MONOCYTES NFR BLD AUTO: 9 %
MUCOUS THREADS #/AREA URNS LPF: PRESENT /LPF
NEUTROPHILS # BLD AUTO: 11.6 10E3/UL (ref 1.6–8.3)
NEUTROPHILS # BLD AUTO: 12.3 10E3/UL (ref 1.6–8.3)
NEUTROPHILS # BLD AUTO: 13.6 10E3/UL (ref 1.6–8.3)
NEUTROPHILS NFR BLD AUTO: 67 %
NEUTROPHILS NFR BLD AUTO: 74 %
NEUTROPHILS NFR BLD AUTO: 74 %
NITRATE UR QL: NEGATIVE
NRBC # BLD AUTO: 0 10E3/UL
NRBC BLD AUTO-RTO: 0 /100
P AXIS - MUSE: 67 DEGREES
P AXIS - MUSE: 75 DEGREES
P AXIS - MUSE: 79 DEGREES
PATH REPORT.COMMENTS IMP SPEC: ABNORMAL
PATH REPORT.COMMENTS IMP SPEC: NORMAL
PATH REPORT.COMMENTS IMP SPEC: YES
PATH REPORT.FINAL DX SPEC: ABNORMAL
PATH REPORT.FINAL DX SPEC: NORMAL
PATH REPORT.GROSS SPEC: ABNORMAL
PATH REPORT.MICROSCOPIC SPEC OTHER STN: ABNORMAL
PATH REPORT.MICROSCOPIC SPEC OTHER STN: NORMAL
PATH REPORT.RELEVANT HX SPEC: ABNORMAL
PATH REPORT.RELEVANT HX SPEC: NORMAL
PH UR STRIP: 6 [PH] (ref 5–7)
PHOTO IMAGE: ABNORMAL
PLATELET # BLD AUTO: 309 10E3/UL (ref 150–450)
PLATELET # BLD AUTO: 339 10E3/UL (ref 150–450)
PLATELET # BLD AUTO: 346 10E3/UL (ref 150–450)
PLATELET # BLD AUTO: 379 10E3/UL (ref 150–450)
PLATELET # BLD AUTO: 381 10E3/UL (ref 150–450)
PLATELET # BLD AUTO: 452 10E3/UL (ref 150–450)
POTASSIUM SERPL-SCNC: 3.6 MMOL/L (ref 3.4–5.3)
POTASSIUM SERPL-SCNC: 3.7 MMOL/L (ref 3.4–5.3)
POTASSIUM SERPL-SCNC: 4.3 MMOL/L (ref 3.4–5.3)
POTASSIUM SERPL-SCNC: 4.3 MMOL/L (ref 3.4–5.3)
PR INTERVAL - MUSE: 144 MS
PR INTERVAL - MUSE: 150 MS
PR INTERVAL - MUSE: 152 MS
PROT SERPL-MCNC: 5.9 G/DL (ref 6.4–8.3)
PROT SERPL-MCNC: 6.5 G/DL (ref 6.4–8.3)
PROT SERPL-MCNC: 6.6 G/DL (ref 6.4–8.3)
PROT SERPL-MCNC: 7.2 G/DL (ref 6.4–8.3)
QRS DURATION - MUSE: 76 MS
QRS DURATION - MUSE: 76 MS
QRS DURATION - MUSE: 80 MS
QT - MUSE: 352 MS
QT - MUSE: 358 MS
QT - MUSE: 364 MS
QTC - MUSE: 423 MS
QTC - MUSE: 428 MS
QTC - MUSE: 438 MS
R AXIS - MUSE: -16 DEGREES
R AXIS - MUSE: 23 DEGREES
R AXIS - MUSE: 8 DEGREES
RADIOLOGIST FLAGS: ABNORMAL
RADIOLOGIST FLAGS: ABNORMAL
RBC # BLD AUTO: 3.67 10E6/UL (ref 3.8–5.2)
RBC # BLD AUTO: 3.8 10E6/UL (ref 3.8–5.2)
RBC # BLD AUTO: 4.07 10E6/UL (ref 3.8–5.2)
RBC # BLD AUTO: 4.1 10E6/UL (ref 3.8–5.2)
RBC # BLD AUTO: 4.14 10E6/UL (ref 3.8–5.2)
RBC # BLD AUTO: 4.17 10E6/UL (ref 3.8–5.2)
RBC URINE: 0 /HPF
SODIUM SERPL-SCNC: 134 MMOL/L (ref 135–145)
SODIUM SERPL-SCNC: 134 MMOL/L (ref 135–145)
SODIUM SERPL-SCNC: 135 MMOL/L (ref 135–145)
SODIUM SERPL-SCNC: 136 MMOL/L (ref 135–145)
SP GR UR STRIP: 1.03 (ref 1–1.03)
SPECIMEN STATUS: NORMAL
SQUAMOUS EPITHELIAL: 4 /HPF
SYSTOLIC BLOOD PRESSURE - MUSE: NORMAL MMHG
T AXIS - MUSE: 67 DEGREES
T AXIS - MUSE: 68 DEGREES
T AXIS - MUSE: 76 DEGREES
TROPONIN T SERPL HS-MCNC: 70 NG/L
TROPONIN T SERPL HS-MCNC: 72 NG/L
UFH PPP CHRO-ACNC: 0.36 IU/ML
UFH PPP CHRO-ACNC: 0.4 IU/ML
UFH PPP CHRO-ACNC: 0.45 IU/ML
UROBILINOGEN UR STRIP-MCNC: 6 MG/DL
VENTRICULAR RATE- MUSE: 86 BPM
VENTRICULAR RATE- MUSE: 87 BPM
VENTRICULAR RATE- MUSE: 87 BPM
WBC # BLD AUTO: 12.8 10E3/UL (ref 4–11)
WBC # BLD AUTO: 14.7 10E3/UL (ref 4–11)
WBC # BLD AUTO: 16.4 10E3/UL (ref 4–11)
WBC # BLD AUTO: 17 10E3/UL (ref 4–11)
WBC # BLD AUTO: 18.5 10E3/UL (ref 4–11)
WBC # BLD AUTO: 20.9 10E3/UL (ref 4–11)
WBC URINE: 2 /HPF

## 2024-01-01 PROCEDURE — 96360 HYDRATION IV INFUSION INIT: CPT

## 2024-01-01 PROCEDURE — 36415 COLL VENOUS BLD VENIPUNCTURE: CPT | Performed by: PATHOLOGY

## 2024-01-01 PROCEDURE — 88341 IMHCHEM/IMCYTCHM EA ADD ANTB: CPT | Mod: 26 | Performed by: PATHOLOGY

## 2024-01-01 PROCEDURE — 72072 X-RAY EXAM THORAC SPINE 3VWS: CPT | Performed by: STUDENT IN AN ORGANIZED HEALTH CARE EDUCATION/TRAINING PROGRAM

## 2024-01-01 PROCEDURE — G1010 CDSM STANSON: HCPCS | Performed by: RADIOLOGY

## 2024-01-01 PROCEDURE — 88184 FLOWCYTOMETRY/ TC 1 MARKER: CPT | Performed by: INTERNAL MEDICINE

## 2024-01-01 PROCEDURE — 999N000111 HC STATISTIC OT IP EVAL DEFER

## 2024-01-01 PROCEDURE — 80053 COMPREHEN METABOLIC PANEL: CPT | Performed by: EMERGENCY MEDICINE

## 2024-01-01 PROCEDURE — 85027 COMPLETE CBC AUTOMATED: CPT | Performed by: STUDENT IN AN ORGANIZED HEALTH CARE EDUCATION/TRAINING PROGRAM

## 2024-01-01 PROCEDURE — 250N000013 HC RX MED GY IP 250 OP 250 PS 637

## 2024-01-01 PROCEDURE — 999N000054 HC STATISTIC EKG NON-CHARGEABLE

## 2024-01-01 PROCEDURE — 99205 OFFICE O/P NEW HI 60 MIN: CPT | Performed by: STUDENT IN AN ORGANIZED HEALTH CARE EDUCATION/TRAINING PROGRAM

## 2024-01-01 PROCEDURE — 36415 COLL VENOUS BLD VENIPUNCTURE: CPT | Performed by: EMERGENCY MEDICINE

## 2024-01-01 PROCEDURE — 96361 HYDRATE IV INFUSION ADD-ON: CPT | Performed by: EMERGENCY MEDICINE

## 2024-01-01 PROCEDURE — G0463 HOSPITAL OUTPT CLINIC VISIT: HCPCS | Performed by: STUDENT IN AN ORGANIZED HEALTH CARE EDUCATION/TRAINING PROGRAM

## 2024-01-01 PROCEDURE — 258N000003 HC RX IP 258 OP 636: Performed by: NURSE PRACTITIONER

## 2024-01-01 PROCEDURE — 250N000011 HC RX IP 250 OP 636: Performed by: INTERNAL MEDICINE

## 2024-01-01 PROCEDURE — 36415 COLL VENOUS BLD VENIPUNCTURE: CPT | Performed by: STUDENT IN AN ORGANIZED HEALTH CARE EDUCATION/TRAINING PROGRAM

## 2024-01-01 PROCEDURE — C1889 IMPLANT/INSERT DEVICE, NOC: HCPCS

## 2024-01-01 PROCEDURE — 99223 1ST HOSP IP/OBS HIGH 75: CPT | Performed by: INTERNAL MEDICINE

## 2024-01-01 PROCEDURE — 71046 X-RAY EXAM CHEST 2 VIEWS: CPT

## 2024-01-01 PROCEDURE — 250N000011 HC RX IP 250 OP 636: Performed by: STUDENT IN AN ORGANIZED HEALTH CARE EDUCATION/TRAINING PROGRAM

## 2024-01-01 PROCEDURE — A9552 F18 FDG: HCPCS | Performed by: INTERNAL MEDICINE

## 2024-01-01 PROCEDURE — 84484 ASSAY OF TROPONIN QUANT: CPT | Performed by: EMERGENCY MEDICINE

## 2024-01-01 PROCEDURE — 250N000011 HC RX IP 250 OP 636: Performed by: EMERGENCY MEDICINE

## 2024-01-01 PROCEDURE — 97530 THERAPEUTIC ACTIVITIES: CPT | Mod: GP

## 2024-01-01 PROCEDURE — 999N000128 HC STATISTIC PERIPHERAL IV START W/O US GUIDANCE

## 2024-01-01 PROCEDURE — 99214 OFFICE O/P EST MOD 30 MIN: CPT | Performed by: INTERNAL MEDICINE

## 2024-01-01 PROCEDURE — 86301 IMMUNOASSAY TUMOR CA 19-9: CPT | Mod: 90 | Performed by: PATHOLOGY

## 2024-01-01 PROCEDURE — 71046 X-RAY EXAM CHEST 2 VIEWS: CPT | Mod: 26 | Performed by: RADIOLOGY

## 2024-01-01 PROCEDURE — 258N000003 HC RX IP 258 OP 636: Performed by: STUDENT IN AN ORGANIZED HEALTH CARE EDUCATION/TRAINING PROGRAM

## 2024-01-01 PROCEDURE — 78816 PET IMAGE W/CT FULL BODY: CPT | Mod: MG,PI

## 2024-01-01 PROCEDURE — 258N000003 HC RX IP 258 OP 636: Performed by: EMERGENCY MEDICINE

## 2024-01-01 PROCEDURE — 85520 HEPARIN ASSAY: CPT | Performed by: STUDENT IN AN ORGANIZED HEALTH CARE EDUCATION/TRAINING PROGRAM

## 2024-01-01 PROCEDURE — 85025 COMPLETE CBC W/AUTO DIFF WBC: CPT | Performed by: PATHOLOGY

## 2024-01-01 PROCEDURE — 99215 OFFICE O/P EST HI 40 MIN: CPT | Performed by: INTERNAL MEDICINE

## 2024-01-01 PROCEDURE — 85610 PROTHROMBIN TIME: CPT

## 2024-01-01 PROCEDURE — 88185 FLOWCYTOMETRY/TC ADD-ON: CPT | Performed by: INTERNAL MEDICINE

## 2024-01-01 PROCEDURE — 999N000142 HC STATISTIC PROCEDURE PREP ONLY

## 2024-01-01 PROCEDURE — 80053 COMPREHEN METABOLIC PANEL: CPT | Performed by: PATHOLOGY

## 2024-01-01 PROCEDURE — 76942 ECHO GUIDE FOR BIOPSY: CPT | Mod: 26 | Performed by: STUDENT IN AN ORGANIZED HEALTH CARE EDUCATION/TRAINING PROGRAM

## 2024-01-01 PROCEDURE — 258N000003 HC RX IP 258 OP 636

## 2024-01-01 PROCEDURE — G0463 HOSPITAL OUTPT CLINIC VISIT: HCPCS | Mod: 25 | Performed by: STUDENT IN AN ORGANIZED HEALTH CARE EDUCATION/TRAINING PROGRAM

## 2024-01-01 PROCEDURE — 99000 SPECIMEN HANDLING OFFICE-LAB: CPT | Performed by: PATHOLOGY

## 2024-01-01 PROCEDURE — 85027 COMPLETE CBC AUTOMATED: CPT | Performed by: NURSE PRACTITIONER

## 2024-01-01 PROCEDURE — 250N000011 HC RX IP 250 OP 636

## 2024-01-01 PROCEDURE — G0463 HOSPITAL OUTPT CLINIC VISIT: HCPCS | Performed by: INTERNAL MEDICINE

## 2024-01-01 PROCEDURE — 120N000002 HC R&B MED SURG/OB UMMC

## 2024-01-01 PROCEDURE — 99152 MOD SED SAME PHYS/QHP 5/>YRS: CPT

## 2024-01-01 PROCEDURE — 36415 COLL VENOUS BLD VENIPUNCTURE: CPT | Performed by: NURSE PRACTITIONER

## 2024-01-01 PROCEDURE — 999N000208 ECHOCARDIOGRAM COMPLETE

## 2024-01-01 PROCEDURE — 74177 CT ABD & PELVIS W/CONTRAST: CPT | Mod: MG

## 2024-01-01 PROCEDURE — 71275 CT ANGIOGRAPHY CHEST: CPT | Mod: ME

## 2024-01-01 PROCEDURE — 96361 HYDRATE IV INFUSION ADD-ON: CPT

## 2024-01-01 PROCEDURE — 96040 HC GENETIC COUNSELING, EACH 30 MINUTES: CPT | Mod: GT | Performed by: GENETIC COUNSELOR, MS

## 2024-01-01 PROCEDURE — 88341 IMHCHEM/IMCYTCHM EA ADD ANTB: CPT | Mod: TC | Performed by: INTERNAL MEDICINE

## 2024-01-01 PROCEDURE — 74177 CT ABD & PELVIS W/CONTRAST: CPT | Mod: 26 | Performed by: RADIOLOGY

## 2024-01-01 PROCEDURE — 99417 PROLNG OP E/M EACH 15 MIN: CPT | Performed by: STUDENT IN AN ORGANIZED HEALTH CARE EDUCATION/TRAINING PROGRAM

## 2024-01-01 PROCEDURE — 99223 1ST HOSP IP/OBS HIGH 75: CPT | Mod: AI | Performed by: STUDENT IN AN ORGANIZED HEALTH CARE EDUCATION/TRAINING PROGRAM

## 2024-01-01 PROCEDURE — 97161 PT EVAL LOW COMPLEX 20 MIN: CPT | Mod: GP

## 2024-01-01 PROCEDURE — 250N000013 HC RX MED GY IP 250 OP 250 PS 637: Performed by: EMERGENCY MEDICINE

## 2024-01-01 PROCEDURE — 85610 PROTHROMBIN TIME: CPT | Performed by: NURSE PRACTITIONER

## 2024-01-01 PROCEDURE — 93005 ELECTROCARDIOGRAM TRACING: CPT

## 2024-01-01 PROCEDURE — 87040 BLOOD CULTURE FOR BACTERIA: CPT | Performed by: EMERGENCY MEDICINE

## 2024-01-01 PROCEDURE — 85379 FIBRIN DEGRADATION QUANT: CPT | Performed by: EMERGENCY MEDICINE

## 2024-01-01 PROCEDURE — 71275 CT ANGIOGRAPHY CHEST: CPT | Mod: MG | Performed by: RADIOLOGY

## 2024-01-01 PROCEDURE — 85027 COMPLETE CBC AUTOMATED: CPT

## 2024-01-01 PROCEDURE — 99215 OFFICE O/P EST HI 40 MIN: CPT | Mod: 95 | Performed by: INTERNAL MEDICINE

## 2024-01-01 PROCEDURE — 99223 1ST HOSP IP/OBS HIGH 75: CPT

## 2024-01-01 PROCEDURE — 47000 NEEDLE BIOPSY OF LIVER PERQ: CPT | Mod: GC | Performed by: STUDENT IN AN ORGANIZED HEALTH CARE EDUCATION/TRAINING PROGRAM

## 2024-01-01 PROCEDURE — 343N000001 HC RX 343: Performed by: INTERNAL MEDICINE

## 2024-01-01 PROCEDURE — 99239 HOSP IP/OBS DSCHRG MGMT >30: CPT | Mod: GC | Performed by: PEDIATRICS

## 2024-01-01 PROCEDURE — 99417 PROLNG OP E/M EACH 15 MIN: CPT | Performed by: INTERNAL MEDICINE

## 2024-01-01 PROCEDURE — 96374 THER/PROPH/DIAG INJ IV PUSH: CPT | Performed by: EMERGENCY MEDICINE

## 2024-01-01 PROCEDURE — 250N000012 HC RX MED GY IP 250 OP 636 PS 637

## 2024-01-01 PROCEDURE — 83735 ASSAY OF MAGNESIUM: CPT | Performed by: EMERGENCY MEDICINE

## 2024-01-01 PROCEDURE — 88307 TISSUE EXAM BY PATHOLOGIST: CPT | Mod: 26 | Performed by: PATHOLOGY

## 2024-01-01 PROCEDURE — 80053 COMPREHEN METABOLIC PANEL: CPT

## 2024-01-01 PROCEDURE — 83605 ASSAY OF LACTIC ACID: CPT | Performed by: EMERGENCY MEDICINE

## 2024-01-01 PROCEDURE — 93010 ELECTROCARDIOGRAM REPORT: CPT | Performed by: INTERNAL MEDICINE

## 2024-01-01 PROCEDURE — 83615 LACTATE (LD) (LDH) ENZYME: CPT | Performed by: PATHOLOGY

## 2024-01-01 PROCEDURE — 93005 ELECTROCARDIOGRAM TRACING: CPT | Performed by: EMERGENCY MEDICINE

## 2024-01-01 PROCEDURE — 96374 THER/PROPH/DIAG INJ IV PUSH: CPT

## 2024-01-01 PROCEDURE — 88342 IMHCHEM/IMCYTCHM 1ST ANTB: CPT | Mod: 26 | Performed by: PATHOLOGY

## 2024-01-01 PROCEDURE — 999N000134 HC STATISTIC PP CARE STAGE 3

## 2024-01-01 PROCEDURE — 82378 CARCINOEMBRYONIC ANTIGEN: CPT | Performed by: INTERNAL MEDICINE

## 2024-01-01 PROCEDURE — 85025 COMPLETE CBC W/AUTO DIFF WBC: CPT

## 2024-01-01 PROCEDURE — 85025 COMPLETE CBC W/AUTO DIFF WBC: CPT | Performed by: EMERGENCY MEDICINE

## 2024-01-01 PROCEDURE — 99285 EMERGENCY DEPT VISIT HI MDM: CPT | Mod: 25 | Performed by: EMERGENCY MEDICINE

## 2024-01-01 PROCEDURE — 71100 X-RAY EXAM RIBS UNI 2 VIEWS: CPT | Mod: LT | Performed by: RADIOLOGY

## 2024-01-01 PROCEDURE — G2211 COMPLEX E/M VISIT ADD ON: HCPCS | Performed by: STUDENT IN AN ORGANIZED HEALTH CARE EDUCATION/TRAINING PROGRAM

## 2024-01-01 PROCEDURE — 99205 OFFICE O/P NEW HI 60 MIN: CPT | Performed by: INTERNAL MEDICINE

## 2024-01-01 PROCEDURE — G0463 HOSPITAL OUTPT CLINIC VISIT: HCPCS | Mod: 27 | Performed by: INTERNAL MEDICINE

## 2024-01-01 PROCEDURE — 82040 ASSAY OF SERUM ALBUMIN: CPT

## 2024-01-01 PROCEDURE — 36415 COLL VENOUS BLD VENIPUNCTURE: CPT

## 2024-01-01 PROCEDURE — 86300 IMMUNOASSAY TUMOR CA 15-3: CPT | Performed by: INTERNAL MEDICINE

## 2024-01-01 PROCEDURE — 82962 GLUCOSE BLOOD TEST: CPT

## 2024-01-01 PROCEDURE — 71275 CT ANGIOGRAPHY CHEST: CPT | Mod: 26 | Performed by: RADIOLOGY

## 2024-01-01 PROCEDURE — 85730 THROMBOPLASTIN TIME PARTIAL: CPT

## 2024-01-01 PROCEDURE — 93306 TTE W/DOPPLER COMPLETE: CPT | Mod: 26 | Performed by: INTERNAL MEDICINE

## 2024-01-01 PROCEDURE — 99233 SBSQ HOSP IP/OBS HIGH 50: CPT | Mod: GC | Performed by: STUDENT IN AN ORGANIZED HEALTH CARE EDUCATION/TRAINING PROGRAM

## 2024-01-01 PROCEDURE — 99233 SBSQ HOSP IP/OBS HIGH 50: CPT

## 2024-01-01 PROCEDURE — 99152 MOD SED SAME PHYS/QHP 5/>YRS: CPT | Mod: GC | Performed by: STUDENT IN AN ORGANIZED HEALTH CARE EDUCATION/TRAINING PROGRAM

## 2024-01-01 PROCEDURE — 99285 EMERGENCY DEPT VISIT HI MDM: CPT | Performed by: EMERGENCY MEDICINE

## 2024-01-01 PROCEDURE — 71046 X-RAY EXAM CHEST 2 VIEWS: CPT | Mod: GC | Performed by: RADIOLOGY

## 2024-01-01 PROCEDURE — 71260 CT THORAX DX C+: CPT

## 2024-01-01 PROCEDURE — 88189 FLOWCYTOMETRY/READ 16 & >: CPT | Mod: GC | Performed by: PATHOLOGY

## 2024-01-01 PROCEDURE — 81001 URINALYSIS AUTO W/SCOPE: CPT | Performed by: EMERGENCY MEDICINE

## 2024-01-01 PROCEDURE — 255N000002 HC RX 255 OP 636: Performed by: INTERNAL MEDICINE

## 2024-01-01 RX ORDER — HEPARIN SODIUM,PORCINE 10 UNIT/ML
5-20 VIAL (ML) INTRAVENOUS DAILY PRN
Status: CANCELLED | OUTPATIENT
Start: 2024-01-01

## 2024-01-01 RX ORDER — ONDANSETRON 2 MG/ML
4 INJECTION INTRAMUSCULAR; INTRAVENOUS EVERY 6 HOURS PRN
Status: DISCONTINUED | OUTPATIENT
Start: 2024-01-01 | End: 2024-01-01 | Stop reason: HOSPADM

## 2024-01-01 RX ORDER — NALOXONE HYDROCHLORIDE 0.4 MG/ML
0.2 INJECTION, SOLUTION INTRAMUSCULAR; INTRAVENOUS; SUBCUTANEOUS
Status: DISCONTINUED | OUTPATIENT
Start: 2024-01-01 | End: 2024-01-01 | Stop reason: HOSPADM

## 2024-01-01 RX ORDER — AMOXICILLIN 250 MG
1 CAPSULE ORAL 2 TIMES DAILY PRN
Status: DISCONTINUED | OUTPATIENT
Start: 2024-01-01 | End: 2024-01-01 | Stop reason: HOSPADM

## 2024-01-01 RX ORDER — LIDOCAINE 40 MG/G
CREAM TOPICAL
Status: DISCONTINUED | OUTPATIENT
Start: 2024-01-01 | End: 2024-01-01

## 2024-01-01 RX ORDER — ALBUTEROL SULFATE 0.83 MG/ML
2.5 SOLUTION RESPIRATORY (INHALATION)
Status: CANCELLED | OUTPATIENT
Start: 2024-01-01

## 2024-01-01 RX ORDER — PANTOPRAZOLE SODIUM 40 MG/1
40 TABLET, DELAYED RELEASE ORAL
Status: DISCONTINUED | OUTPATIENT
Start: 2024-01-01 | End: 2024-01-01

## 2024-01-01 RX ORDER — BISACODYL 10 MG
20 SUPPOSITORY, RECTAL RECTAL ONCE
Status: COMPLETED | OUTPATIENT
Start: 2024-01-01 | End: 2024-01-01

## 2024-01-01 RX ORDER — BUPRENORPHINE 5 UG/H
1 PATCH TRANSDERMAL WEEKLY
Status: DISCONTINUED | OUTPATIENT
Start: 2024-01-01 | End: 2024-01-01 | Stop reason: HOSPADM

## 2024-01-01 RX ORDER — FLUMAZENIL 0.1 MG/ML
0.2 INJECTION, SOLUTION INTRAVENOUS
Status: DISCONTINUED | OUTPATIENT
Start: 2024-01-01 | End: 2024-01-01 | Stop reason: HOSPADM

## 2024-01-01 RX ORDER — ACETAMINOPHEN 500 MG
1000 TABLET ORAL EVERY 8 HOURS
Qty: 540 TABLET | Refills: 0 | Status: SHIPPED | OUTPATIENT
Start: 2024-01-01 | End: 2024-06-24

## 2024-01-01 RX ORDER — ONDANSETRON 4 MG/1
4 TABLET, ORALLY DISINTEGRATING ORAL EVERY 6 HOURS PRN
Qty: 60 TABLET | Refills: 3 | Status: SHIPPED | OUTPATIENT
Start: 2024-01-01

## 2024-01-01 RX ORDER — ONDANSETRON 2 MG/ML
4 INJECTION INTRAMUSCULAR; INTRAVENOUS EVERY 6 HOURS PRN
Status: CANCELLED
Start: 2024-01-01

## 2024-01-01 RX ORDER — FAMOTIDINE 20 MG/1
40 TABLET, FILM COATED ORAL DAILY
Status: DISCONTINUED | OUTPATIENT
Start: 2024-01-01 | End: 2024-01-01 | Stop reason: HOSPADM

## 2024-01-01 RX ORDER — HYDROMORPHONE HYDROCHLORIDE 1 MG/ML
0.5 INJECTION, SOLUTION INTRAMUSCULAR; INTRAVENOUS; SUBCUTANEOUS ONCE
Status: CANCELLED
Start: 2024-01-01 | End: 2024-01-01

## 2024-01-01 RX ORDER — LORAZEPAM 0.5 MG/1
0.5 TABLET ORAL ONCE
Status: COMPLETED | OUTPATIENT
Start: 2024-01-01 | End: 2024-01-01

## 2024-01-01 RX ORDER — ONDANSETRON 2 MG/ML
4 INJECTION INTRAMUSCULAR; INTRAVENOUS ONCE
Status: CANCELLED | OUTPATIENT
Start: 2024-01-01 | End: 2024-01-01

## 2024-01-01 RX ORDER — BUPRENORPHINE 5 UG/H
1 PATCH TRANSDERMAL
Qty: 4 PATCH | Refills: 0 | Status: SHIPPED | OUTPATIENT
Start: 2024-01-01

## 2024-01-01 RX ORDER — ONDANSETRON 2 MG/ML
4 INJECTION INTRAMUSCULAR; INTRAVENOUS ONCE
Status: DISCONTINUED | OUTPATIENT
Start: 2024-01-01 | End: 2024-01-01 | Stop reason: HOSPADM

## 2024-01-01 RX ORDER — SODIUM BICARBONATE 650 MG/1
650 TABLET ORAL
Status: DISCONTINUED | OUTPATIENT
Start: 2024-01-01 | End: 2024-01-01

## 2024-01-01 RX ORDER — ONDANSETRON 4 MG/1
4 TABLET, ORALLY DISINTEGRATING ORAL EVERY 6 HOURS PRN
Status: DISCONTINUED | OUTPATIENT
Start: 2024-01-01 | End: 2024-01-01

## 2024-01-01 RX ORDER — NALOXONE HYDROCHLORIDE 0.4 MG/ML
0.4 INJECTION, SOLUTION INTRAMUSCULAR; INTRAVENOUS; SUBCUTANEOUS
Status: DISCONTINUED | OUTPATIENT
Start: 2024-01-01 | End: 2024-01-01 | Stop reason: HOSPADM

## 2024-01-01 RX ORDER — HEPARIN SODIUM (PORCINE) LOCK FLUSH IV SOLN 100 UNIT/ML 100 UNIT/ML
5 SOLUTION INTRAVENOUS
Status: CANCELLED | OUTPATIENT
Start: 2024-01-01

## 2024-01-01 RX ORDER — ATORVASTATIN CALCIUM 10 MG/1
10 TABLET, FILM COATED ORAL EVERY OTHER DAY
Status: CANCELLED | OUTPATIENT
Start: 2024-01-01

## 2024-01-01 RX ORDER — POLYETHYLENE GLYCOL 3350 17 G/17G
17 POWDER, FOR SOLUTION ORAL DAILY
Qty: 510 G | Refills: 0 | Status: SHIPPED | OUTPATIENT
Start: 2024-01-01 | End: 2024-06-24

## 2024-01-01 RX ORDER — PROCHLORPERAZINE 25 MG
12.5 SUPPOSITORY, RECTAL RECTAL EVERY 12 HOURS PRN
Status: DISCONTINUED | OUTPATIENT
Start: 2024-01-01 | End: 2024-01-01 | Stop reason: HOSPADM

## 2024-01-01 RX ORDER — EPINEPHRINE 1 MG/ML
0.3 INJECTION, SOLUTION INTRAMUSCULAR; SUBCUTANEOUS EVERY 5 MIN PRN
Status: CANCELLED | OUTPATIENT
Start: 2024-01-01

## 2024-01-01 RX ORDER — ACETAMINOPHEN 500 MG
1000 TABLET ORAL EVERY 6 HOURS
Status: DISCONTINUED | OUTPATIENT
Start: 2024-01-01 | End: 2024-01-01

## 2024-01-01 RX ORDER — AMOXICILLIN 250 MG
2 CAPSULE ORAL 2 TIMES DAILY PRN
Status: DISCONTINUED | OUTPATIENT
Start: 2024-01-01 | End: 2024-01-01 | Stop reason: HOSPADM

## 2024-01-01 RX ORDER — HYDROMORPHONE HYDROCHLORIDE 1 MG/ML
0.5 INJECTION, SOLUTION INTRAMUSCULAR; INTRAVENOUS; SUBCUTANEOUS ONCE
Status: COMPLETED | OUTPATIENT
Start: 2024-01-01 | End: 2024-01-01

## 2024-01-01 RX ORDER — ONDANSETRON 4 MG/1
4 TABLET, ORALLY DISINTEGRATING ORAL EVERY 6 HOURS PRN
Status: DISCONTINUED | OUTPATIENT
Start: 2024-01-01 | End: 2024-01-01 | Stop reason: HOSPADM

## 2024-01-01 RX ORDER — LORAZEPAM 0.5 MG/1
0.5 TABLET ORAL
Status: DISCONTINUED | OUTPATIENT
Start: 2024-01-01 | End: 2024-01-01

## 2024-01-01 RX ORDER — PROCHLORPERAZINE MALEATE 5 MG
5 TABLET ORAL EVERY 6 HOURS PRN
Qty: 30 TABLET | Refills: 0 | Status: SHIPPED | OUTPATIENT
Start: 2024-01-01 | End: 2024-04-25

## 2024-01-01 RX ORDER — DIPHENHYDRAMINE HYDROCHLORIDE 50 MG/ML
50 INJECTION INTRAMUSCULAR; INTRAVENOUS
Status: CANCELLED
Start: 2024-01-01

## 2024-01-01 RX ORDER — SALIVA STIMULANT COMB. NO.3
1 SPRAY, NON-AEROSOL (ML) MUCOUS MEMBRANE 4 TIMES DAILY
Status: DISCONTINUED | OUTPATIENT
Start: 2024-01-01 | End: 2024-01-01

## 2024-01-01 RX ORDER — ACETAMINOPHEN 500 MG
1000 TABLET ORAL EVERY 6 HOURS PRN
Status: DISCONTINUED | OUTPATIENT
Start: 2024-01-01 | End: 2024-01-01

## 2024-01-01 RX ORDER — IOPAMIDOL 755 MG/ML
53 INJECTION, SOLUTION INTRAVASCULAR ONCE
Status: COMPLETED | OUTPATIENT
Start: 2024-01-01 | End: 2024-01-01

## 2024-01-01 RX ORDER — LIDOCAINE 4 G/G
1 PATCH TOPICAL EVERY 24 HOURS
Status: DISCONTINUED | OUTPATIENT
Start: 2024-01-01 | End: 2024-01-01 | Stop reason: HOSPADM

## 2024-01-01 RX ORDER — IOPAMIDOL 755 MG/ML
43 INJECTION, SOLUTION INTRAVASCULAR ONCE
Status: COMPLETED | OUTPATIENT
Start: 2024-01-01 | End: 2024-01-01

## 2024-01-01 RX ORDER — AMOXICILLIN 250 MG
1 CAPSULE ORAL 2 TIMES DAILY PRN
Qty: 30 TABLET | Refills: 0 | Status: SHIPPED | OUTPATIENT
Start: 2024-01-01 | End: 2024-04-25

## 2024-01-01 RX ORDER — OXYCODONE HYDROCHLORIDE 5 MG/1
5-10 TABLET ORAL EVERY 4 HOURS PRN
Qty: 120 TABLET | Refills: 0 | Status: SHIPPED | OUTPATIENT
Start: 2024-01-01

## 2024-01-01 RX ORDER — LIDOCAINE 40 MG/G
CREAM TOPICAL
Status: DISCONTINUED | OUTPATIENT
Start: 2024-01-01 | End: 2024-01-01 | Stop reason: HOSPADM

## 2024-01-01 RX ORDER — ACETAMINOPHEN 500 MG
500-1000 TABLET ORAL EVERY 6 HOURS PRN
COMMUNITY

## 2024-01-01 RX ORDER — LORAZEPAM 2 MG/ML
0.5 INJECTION INTRAMUSCULAR ONCE
Status: DISCONTINUED | OUTPATIENT
Start: 2024-01-01 | End: 2024-01-01

## 2024-01-01 RX ORDER — METHOCARBAMOL 500 MG/1
500-1000 TABLET, FILM COATED ORAL 3 TIMES DAILY PRN
Qty: 21 TABLET | Refills: 0 | Status: SHIPPED | OUTPATIENT
Start: 2024-01-01

## 2024-01-01 RX ORDER — VITAMIN B COMPLEX
50 TABLET ORAL DAILY
Status: DISCONTINUED | OUTPATIENT
Start: 2024-01-01 | End: 2024-01-01 | Stop reason: HOSPADM

## 2024-01-01 RX ORDER — METHOCARBAMOL 500 MG/1
500 TABLET, FILM COATED ORAL 3 TIMES DAILY
Qty: 21 TABLET | Refills: 0 | Status: ON HOLD | OUTPATIENT
Start: 2024-01-01 | End: 2024-01-01

## 2024-01-01 RX ORDER — OXYCODONE HYDROCHLORIDE 5 MG/1
5 TABLET ORAL EVERY 4 HOURS PRN
Status: DISCONTINUED | OUTPATIENT
Start: 2024-01-01 | End: 2024-01-01

## 2024-01-01 RX ORDER — LUBIPROSTONE 24 UG/1
24 CAPSULE ORAL 2 TIMES DAILY WITH MEALS
Qty: 60 CAPSULE | Refills: 1 | Status: SHIPPED | OUTPATIENT
Start: 2024-01-01

## 2024-01-01 RX ORDER — ATORVASTATIN CALCIUM 10 MG/1
10 TABLET, FILM COATED ORAL EVERY OTHER DAY
Status: DISCONTINUED | OUTPATIENT
Start: 2024-01-01 | End: 2024-01-01 | Stop reason: HOSPADM

## 2024-01-01 RX ORDER — FLUTICASONE PROPIONATE 50 MCG
1 SPRAY, SUSPENSION (ML) NASAL DAILY
Status: DISCONTINUED | OUTPATIENT
Start: 2024-01-01 | End: 2024-01-01 | Stop reason: HOSPADM

## 2024-01-01 RX ORDER — OLANZAPINE 2.5 MG/1
2.5 TABLET, FILM COATED ORAL AT BEDTIME
Qty: 90 TABLET | Refills: 0 | Status: SHIPPED | OUTPATIENT
Start: 2024-01-01 | End: 2024-06-24

## 2024-01-01 RX ORDER — HYDROXYZINE HYDROCHLORIDE 25 MG/1
25 TABLET, FILM COATED ORAL EVERY 8 HOURS PRN
Qty: 60 TABLET | Refills: 0 | Status: SHIPPED | OUTPATIENT
Start: 2024-01-01

## 2024-01-01 RX ORDER — CLONAZEPAM 0.5 MG/1
0.5 TABLET ORAL
Status: DISCONTINUED | OUTPATIENT
Start: 2024-01-01 | End: 2024-01-01 | Stop reason: HOSPADM

## 2024-01-01 RX ORDER — ACETAMINOPHEN 500 MG
1000 TABLET ORAL
Status: CANCELLED | OUTPATIENT
Start: 2024-01-01

## 2024-01-01 RX ORDER — SODIUM CHLORIDE, SODIUM LACTATE, POTASSIUM CHLORIDE, CALCIUM CHLORIDE 600; 310; 30; 20 MG/100ML; MG/100ML; MG/100ML; MG/100ML
INJECTION, SOLUTION INTRAVENOUS CONTINUOUS
Status: DISCONTINUED | OUTPATIENT
Start: 2024-01-01 | End: 2024-01-01

## 2024-01-01 RX ORDER — MEPERIDINE HYDROCHLORIDE 25 MG/ML
25 INJECTION INTRAMUSCULAR; INTRAVENOUS; SUBCUTANEOUS EVERY 30 MIN PRN
Status: CANCELLED | OUTPATIENT
Start: 2024-01-01

## 2024-01-01 RX ORDER — DEXAMETHASONE 4 MG/1
4 TABLET ORAL
Qty: 26 TABLET | Refills: 0 | Status: SHIPPED | OUTPATIENT
Start: 2024-01-01

## 2024-01-01 RX ORDER — ALBUTEROL SULFATE 90 UG/1
1-2 AEROSOL, METERED RESPIRATORY (INHALATION)
Status: CANCELLED
Start: 2024-01-01

## 2024-01-01 RX ORDER — BUPRENORPHINE 10 UG/H
1 PATCH TRANSDERMAL
Qty: 4 PATCH | Refills: 0 | Status: SHIPPED | OUTPATIENT
Start: 2024-01-01

## 2024-01-01 RX ORDER — METHYLPREDNISOLONE SODIUM SUCCINATE 125 MG/2ML
125 INJECTION, POWDER, LYOPHILIZED, FOR SOLUTION INTRAMUSCULAR; INTRAVENOUS
Status: CANCELLED
Start: 2024-01-01

## 2024-01-01 RX ORDER — ONDANSETRON 2 MG/ML
4 INJECTION INTRAMUSCULAR; INTRAVENOUS ONCE
Status: COMPLETED | OUTPATIENT
Start: 2024-01-01 | End: 2024-01-01

## 2024-01-01 RX ORDER — OMEPRAZOLE/SODIUM BICARBONATE 20MG-1.1G
CAPSULE ORAL
Status: DISCONTINUED | OUTPATIENT
Start: 2024-01-01 | End: 2024-01-01

## 2024-01-01 RX ORDER — HEPARIN SODIUM 10000 [USP'U]/100ML
0-5000 INJECTION, SOLUTION INTRAVENOUS CONTINUOUS
Status: DISCONTINUED | OUTPATIENT
Start: 2024-01-01 | End: 2024-01-01

## 2024-01-01 RX ORDER — CALCIUM CARBONATE 500 MG/1
1000 TABLET, CHEWABLE ORAL 4 TIMES DAILY PRN
Status: DISCONTINUED | OUTPATIENT
Start: 2024-01-01 | End: 2024-01-01 | Stop reason: HOSPADM

## 2024-01-01 RX ORDER — IOPAMIDOL 755 MG/ML
90 INJECTION, SOLUTION INTRAVASCULAR ONCE
Status: COMPLETED | OUTPATIENT
Start: 2024-01-01 | End: 2024-01-01

## 2024-01-01 RX ORDER — FENTANYL CITRATE 50 UG/ML
50 INJECTION, SOLUTION INTRAMUSCULAR; INTRAVENOUS ONCE
Status: DISCONTINUED | OUTPATIENT
Start: 2024-01-01 | End: 2024-01-01

## 2024-01-01 RX ORDER — POLYETHYLENE GLYCOL 3350 17 G/17G
17 POWDER, FOR SOLUTION ORAL DAILY
Status: DISCONTINUED | OUTPATIENT
Start: 2024-01-01 | End: 2024-01-01 | Stop reason: HOSPADM

## 2024-01-01 RX ORDER — DEXAMETHASONE 4 MG/1
4 TABLET ORAL
Status: DISCONTINUED | OUTPATIENT
Start: 2024-01-01 | End: 2024-01-01 | Stop reason: HOSPADM

## 2024-01-01 RX ORDER — LORAZEPAM 2 MG/ML
0.5 INJECTION INTRAMUSCULAR ONCE
Status: COMPLETED | OUTPATIENT
Start: 2024-01-01 | End: 2024-01-01

## 2024-01-01 RX ORDER — DOCUSATE SODIUM 100 MG/1
100 CAPSULE, LIQUID FILLED ORAL DAILY
Status: DISCONTINUED | OUTPATIENT
Start: 2024-01-01 | End: 2024-01-01 | Stop reason: HOSPADM

## 2024-01-01 RX ORDER — LORAZEPAM 0.5 MG/1
0.5 TABLET ORAL 3 TIMES DAILY PRN
Qty: 90 TABLET | Refills: 0 | Status: SHIPPED | OUTPATIENT
Start: 2024-01-01

## 2024-01-01 RX ORDER — FLUCONAZOLE 100 MG/1
100 TABLET ORAL DAILY
Status: DISCONTINUED | OUTPATIENT
Start: 2024-01-01 | End: 2024-01-01

## 2024-01-01 RX ORDER — OXYCODONE HYDROCHLORIDE 5 MG/1
5 TABLET ORAL EVERY 6 HOURS PRN
Qty: 30 TABLET | Refills: 0 | Status: SHIPPED | OUTPATIENT
Start: 2024-01-01 | End: 2024-01-01

## 2024-01-01 RX ORDER — PROCHLORPERAZINE MALEATE 5 MG
5 TABLET ORAL EVERY 6 HOURS PRN
Status: DISCONTINUED | OUTPATIENT
Start: 2024-01-01 | End: 2024-01-01 | Stop reason: HOSPADM

## 2024-01-01 RX ORDER — ONDANSETRON 2 MG/ML
4 INJECTION INTRAMUSCULAR; INTRAVENOUS
Status: COMPLETED | OUTPATIENT
Start: 2024-01-01 | End: 2024-01-01

## 2024-01-01 RX ORDER — DOCUSATE SODIUM 100 MG/1
100 CAPSULE, LIQUID FILLED ORAL ONCE
Status: DISCONTINUED | OUTPATIENT
Start: 2024-01-01 | End: 2024-01-01

## 2024-01-01 RX ORDER — DEXAMETHASONE 4 MG/1
4 TABLET ORAL
Qty: 4 TABLET | Refills: 0 | Status: SHIPPED | OUTPATIENT
Start: 2024-01-01 | End: 2024-01-01

## 2024-01-01 RX ORDER — SODIUM CHLORIDE 9 MG/ML
INJECTION, SOLUTION INTRAVENOUS CONTINUOUS
Status: DISCONTINUED | OUTPATIENT
Start: 2024-01-01 | End: 2024-01-01 | Stop reason: HOSPADM

## 2024-01-01 RX ORDER — FENTANYL CITRATE 50 UG/ML
25-50 INJECTION, SOLUTION INTRAMUSCULAR; INTRAVENOUS EVERY 5 MIN PRN
Status: DISCONTINUED | OUTPATIENT
Start: 2024-01-01 | End: 2024-01-01 | Stop reason: HOSPADM

## 2024-01-01 RX ORDER — ACETAMINOPHEN 500 MG
1000 TABLET ORAL EVERY 8 HOURS
Status: DISCONTINUED | OUTPATIENT
Start: 2024-01-01 | End: 2024-01-01 | Stop reason: HOSPADM

## 2024-01-01 RX ORDER — OXYCODONE HYDROCHLORIDE 5 MG/1
5 TABLET ORAL
Status: DISCONTINUED | OUTPATIENT
Start: 2024-01-01 | End: 2024-01-01 | Stop reason: HOSPADM

## 2024-01-01 RX ORDER — METHOCARBAMOL 500 MG/1
500 TABLET, FILM COATED ORAL 3 TIMES DAILY
Status: DISCONTINUED | OUTPATIENT
Start: 2024-01-01 | End: 2024-01-01 | Stop reason: HOSPADM

## 2024-01-01 RX ORDER — METHOCARBAMOL 500 MG/1
500 TABLET, FILM COATED ORAL
COMMUNITY
Start: 2024-01-01 | End: 2024-01-01

## 2024-01-01 RX ORDER — ACETAMINOPHEN 500 MG
1000 TABLET ORAL ONCE
Status: COMPLETED | OUTPATIENT
Start: 2024-01-01 | End: 2024-01-01

## 2024-01-01 RX ADMIN — DEXAMETHASONE 4 MG: 4 TABLET ORAL at 11:12

## 2024-01-01 RX ADMIN — DOCUSATE SODIUM 100 MG: 100 CAPSULE, LIQUID FILLED ORAL at 10:50

## 2024-01-01 RX ADMIN — OXYCODONE HYDROCHLORIDE 5 MG: 5 TABLET ORAL at 19:49

## 2024-01-01 RX ADMIN — IOPAMIDOL 53 ML: 755 INJECTION, SOLUTION INTRAVENOUS at 19:37

## 2024-01-01 RX ADMIN — FENTANYL CITRATE 50 MCG: 50 INJECTION, SOLUTION INTRAMUSCULAR; INTRAVENOUS at 09:16

## 2024-01-01 RX ADMIN — FENTANYL CITRATE 25 MCG: 50 INJECTION, SOLUTION INTRAMUSCULAR; INTRAVENOUS at 09:25

## 2024-01-01 RX ADMIN — OXYCODONE HYDROCHLORIDE 5 MG: 5 TABLET ORAL at 09:37

## 2024-01-01 RX ADMIN — ACETAMINOPHEN 1000 MG: 500 TABLET ORAL at 18:58

## 2024-01-01 RX ADMIN — SODIUM CHLORIDE, POTASSIUM CHLORIDE, SODIUM LACTATE AND CALCIUM CHLORIDE: 600; 310; 30; 20 INJECTION, SOLUTION INTRAVENOUS at 23:09

## 2024-01-01 RX ADMIN — BUPRENORPHINE 1 PATCH: 5 PATCH TRANSDERMAL at 08:17

## 2024-01-01 RX ADMIN — HUMAN ALBUMIN MICROSPHERES AND PERFLUTREN 6 ML: 10; .22 INJECTION, SOLUTION INTRAVENOUS at 09:11

## 2024-01-01 RX ADMIN — POLYETHYLENE GLYCOL 3350 17 G: 17 POWDER, FOR SOLUTION ORAL at 08:12

## 2024-01-01 RX ADMIN — MIDAZOLAM 1 MG: 1 INJECTION INTRAMUSCULAR; INTRAVENOUS at 09:17

## 2024-01-01 RX ADMIN — MIDAZOLAM 0.5 MG: 1 INJECTION INTRAMUSCULAR; INTRAVENOUS at 09:25

## 2024-01-01 RX ADMIN — ONDANSETRON 4 MG: 4 TABLET, ORALLY DISINTEGRATING ORAL at 08:16

## 2024-01-01 RX ADMIN — SODIUM CHLORIDE 2000 ML: 9 INJECTION, SOLUTION INTRAVENOUS at 08:39

## 2024-01-01 RX ADMIN — SODIUM CHLORIDE: 9 INJECTION, SOLUTION INTRAVENOUS at 08:27

## 2024-01-01 RX ADMIN — ONDANSETRON 4 MG: 2 INJECTION INTRAMUSCULAR; INTRAVENOUS at 09:11

## 2024-01-01 RX ADMIN — FLUDEOXYGLUCOSE F-18 11.11 MILLICURIE: 500 INJECTION, SOLUTION INTRAVENOUS at 08:07

## 2024-01-01 RX ADMIN — HEPARIN SODIUM 700 UNITS/HR: 10000 INJECTION, SOLUTION INTRAVENOUS at 04:16

## 2024-01-01 RX ADMIN — LORAZEPAM 0.5 MG: 0.5 TABLET ORAL at 12:22

## 2024-01-01 RX ADMIN — FENTANYL CITRATE 25 MCG: 50 INJECTION, SOLUTION INTRAMUSCULAR; INTRAVENOUS at 09:22

## 2024-01-01 RX ADMIN — OXYCODONE HYDROCHLORIDE 5 MG: 5 TABLET ORAL at 00:39

## 2024-01-01 RX ADMIN — SODIUM CHLORIDE 1000 ML: 9 INJECTION, SOLUTION INTRAVENOUS at 18:50

## 2024-01-01 RX ADMIN — SODIUM PHOSPHATE 1 ENEMA: 7; 19 ENEMA RECTAL at 13:27

## 2024-01-01 RX ADMIN — SODIUM CHLORIDE 1000 ML: 9 INJECTION, SOLUTION INTRAVENOUS at 17:33

## 2024-01-01 RX ADMIN — SODIUM CHLORIDE, POTASSIUM CHLORIDE, SODIUM LACTATE AND CALCIUM CHLORIDE 1000 ML: 600; 310; 30; 20 INJECTION, SOLUTION INTRAVENOUS at 08:31

## 2024-01-01 RX ADMIN — SODIUM CHLORIDE 2000 ML: 9 INJECTION, SOLUTION INTRAVENOUS at 08:05

## 2024-01-01 RX ADMIN — SODIUM CHLORIDE, POTASSIUM CHLORIDE, SODIUM LACTATE AND CALCIUM CHLORIDE: 600; 310; 30; 20 INJECTION, SOLUTION INTRAVENOUS at 12:40

## 2024-01-01 RX ADMIN — FENTANYL CITRATE 50 MCG: 50 INJECTION, SOLUTION INTRAMUSCULAR; INTRAVENOUS at 09:28

## 2024-01-01 RX ADMIN — CLONAZEPAM 0.5 MG: 0.5 TABLET ORAL at 00:14

## 2024-01-01 RX ADMIN — LIDOCAINE 1 PATCH: 4 PATCH TOPICAL at 22:46

## 2024-01-01 RX ADMIN — IOPAMIDOL 90 ML: 755 INJECTION, SOLUTION INTRAVASCULAR at 14:04

## 2024-01-01 RX ADMIN — LORAZEPAM 0.5 MG: 2 INJECTION INTRAMUSCULAR; INTRAVENOUS at 21:45

## 2024-01-01 RX ADMIN — APIXABAN 10 MG: 5 TABLET, FILM COATED ORAL at 08:10

## 2024-01-01 RX ADMIN — ONDANSETRON 4 MG: 4 TABLET, ORALLY DISINTEGRATING ORAL at 00:39

## 2024-01-01 RX ADMIN — ONDANSETRON 4 MG: 4 TABLET, ORALLY DISINTEGRATING ORAL at 08:07

## 2024-01-01 RX ADMIN — POLYETHYLENE GLYCOL 3350 17 G: 17 POWDER, FOR SOLUTION ORAL at 10:50

## 2024-01-01 RX ADMIN — HYDROMORPHONE HYDROCHLORIDE 0.5 MG: 0.5 INJECTION, SOLUTION INTRAMUSCULAR; INTRAVENOUS; SUBCUTANEOUS at 09:06

## 2024-01-01 RX ADMIN — HEPARIN SODIUM 700 UNITS/HR: 10000 INJECTION, SOLUTION INTRAVENOUS at 00:28

## 2024-01-01 RX ADMIN — ONDANSETRON 4 MG: 2 INJECTION INTRAMUSCULAR; INTRAVENOUS at 18:13

## 2024-01-01 RX ADMIN — MIDAZOLAM 1 MG: 1 INJECTION INTRAMUSCULAR; INTRAVENOUS at 09:28

## 2024-01-01 RX ADMIN — ACETAMINOPHEN 1000 MG: 500 TABLET ORAL at 00:40

## 2024-01-01 RX ADMIN — DOCUSATE SODIUM 100 MG: 100 CAPSULE, LIQUID FILLED ORAL at 08:12

## 2024-01-01 RX ADMIN — IOPAMIDOL 43 ML: 755 INJECTION, SOLUTION INTRAVENOUS at 09:06

## 2024-01-01 RX ADMIN — DEXAMETHASONE 4 MG: 4 TABLET ORAL at 08:12

## 2024-01-01 RX ADMIN — MIDAZOLAM 0.5 MG: 1 INJECTION INTRAMUSCULAR; INTRAVENOUS at 09:22

## 2024-01-01 RX ADMIN — FAMOTIDINE 40 MG: 20 TABLET ORAL at 08:11

## 2024-01-01 RX ADMIN — FAMOTIDINE 40 MG: 20 TABLET ORAL at 10:52

## 2024-01-01 RX ADMIN — MIDAZOLAM 1 MG: 1 INJECTION INTRAMUSCULAR; INTRAVENOUS at 09:33

## 2024-01-01 RX ADMIN — FENTANYL CITRATE 50 MCG: 50 INJECTION, SOLUTION INTRAMUSCULAR; INTRAVENOUS at 09:33

## 2024-01-01 ASSESSMENT — ACTIVITIES OF DAILY LIVING (ADL)
ADLS_ACUITY_SCORE: 31
ADLS_ACUITY_SCORE: 35
ADLS_ACUITY_SCORE: 31
ADLS_ACUITY_SCORE: 28
ADLS_ACUITY_SCORE: 31
ADLS_ACUITY_SCORE: 28
ADLS_ACUITY_SCORE: 35
ADLS_ACUITY_SCORE: 31
ADLS_ACUITY_SCORE: 35
ADLS_ACUITY_SCORE: 29
ADLS_ACUITY_SCORE: 31
ADLS_ACUITY_SCORE: 35
ADLS_ACUITY_SCORE: 31
ADLS_ACUITY_SCORE: 35
ADLS_ACUITY_SCORE: 31
ADLS_ACUITY_SCORE: 35
ADLS_ACUITY_SCORE: 31
ADLS_ACUITY_SCORE: 35
ADLS_ACUITY_SCORE: 31
ADLS_ACUITY_SCORE: 35
ADLS_ACUITY_SCORE: 31
ADLS_ACUITY_SCORE: 29
ADLS_ACUITY_SCORE: 31
DEPENDENT_IADLS:: INDEPENDENT
ADLS_ACUITY_SCORE: 31
ADLS_ACUITY_SCORE: 31
ADLS_ACUITY_SCORE: 35
ADLS_ACUITY_SCORE: 35
ADLS_ACUITY_SCORE: 31
ADLS_ACUITY_SCORE: 35
ADLS_ACUITY_SCORE: 31

## 2024-01-01 ASSESSMENT — PAIN SCALES - GENERAL
PAINLEVEL: MODERATE PAIN (5)
PAINLEVEL: SEVERE PAIN (6)
PAINLEVEL: MILD PAIN (3)
PAINLEVEL: SEVERE PAIN (6)
PAINLEVEL: MODERATE PAIN (5)

## 2024-01-01 ASSESSMENT — COLUMBIA-SUICIDE SEVERITY RATING SCALE - C-SSRS
6. HAVE YOU EVER DONE ANYTHING, STARTED TO DO ANYTHING, OR PREPARED TO DO ANYTHING TO END YOUR LIFE?: NO
1. IN THE PAST MONTH, HAVE YOU WISHED YOU WERE DEAD OR WISHED YOU COULD GO TO SLEEP AND NOT WAKE UP?: NO
2. HAVE YOU ACTUALLY HAD ANY THOUGHTS OF KILLING YOURSELF IN THE PAST MONTH?: NO

## 2024-03-08 NOTE — LETTER
3/8/2024         RE: Abigail Lam  [address] [place] [facility] [address]        Dear Colleague,    Thank you for referring your patient, Abigail Lam, to the [facility]. Please see a copy of my visit note below.    DIAGNOSIS: Ms. Lam is 68 year old woman with a history of Stage I, node negative, breast cancer.  It was multifocal.  ER, [place] positive, grade 1, negative for angiolymphatic invasion. One tumor was 2 cm, another was 0.5.  Had a low Oncotype score of 7.  She was treated with bilateral mastectomy, oophorectomy, started Tamoxifen in 2006 and took it for 5 years.  She has now been on extended endocrine therapy with Arimidex since June 2011.  She had been receiving q 6 month zolendronate but this was stopped Dec 2013 as it might have been causing her rib musculskeletal symptoms.  She switched to tamoxifen in June 2017. She stopped tamoxifen in December 2021.     INTERIM HISTORY:  Ms. Lam returns for an unscheduled visit.  At the end of January she fell in her home in the [place] area.  It was a significant fall and hurt her knees and she broke her fall with her left arm although she did not have any fractures.  At that time she had an emergency department visit and they got rib films on the left which was unremarkable.    Even though she was in discomfort she opted to go on a  trip that she had previously planned.  During that time her pain got worse.  She describes it is migrating from the left ribs to her back.  She could not sleep and states that she is not eating well because of the pain.  She went to [place] and visited in the emergency department.  Apparently they got a EKG but did not draw any blood work to examine myocardial damage.  The EKG was normal.  The patient opted to return home.  She could not get a appointment with her primary care provider until towards the end of the month.  Because the pain was significant she decided to come and see me  "for further evaluation.    As noted the pain is covers her sternum and both ribs bilaterally.  She states it is constant and it does not have the features of cardiac pain.    Prior to this fall in November 2023 she called us while she was in [place] with prominence in her right neck.  We advised her to go to an emergency department or primary care physician to evaluate this.  Of note is that she sent a photograph to a relative who was a physician and brought up the possibility that this could be a aneurysm.  However she had Doppler evaluation of her carotid and jugular system at that time which showed no abnormality.  These results are in care everywhere.    She states that her lip symptoms are not improved, we did not focus on this today.       PHYSICAL EXAMINATION:  BP (!) 170/100   Pulse 95   Temp 97.7  F (36.5  C) (Oral)   Resp 14   Wt 40.6 kg (89 lb 8 oz)   SpO2 99%   BMI 16.37 kg/m    GENERAL: She looks uncomfortable moving. She is sitting with a pillow  BACK/SPINE/CHEST: I cannot     PROBLEMS:   1.  History of ER positive breast cancer on extended therapy since about 02/2006. OncotypeDx score = 7  2.  Textured breast implants - recent revision of the left breast implant  3.  Low bone density/Osteoporosis on outside scan   4. H/O GERD worse with emesis episode noted above  5. Intermittent SOB  6. Lower lip swelling - uncertain etiology - diagnosed as \"burning mouth syndrome\"  7. Diagnosis of enterococcus soft tissue infection of lip  8. Low back pain  9. New back and rib pain after traumatic fall        IMPRESSION:   Today she comes for an unscheduled visit to help evaluate her chest pain.  I told her I think is primarily musculoskeletal in nature and I wonder if she fractured something during the fall.  Of note is that the pain is severe enough that she is not eating well or sleeping.  She has a peculiar history of vascular prominence in her neck prior to this trauma.  It looks like she had a " reasonable cardiac evaluation several times and this does not seem to be the source of the pain or the nature of the pain.    Today we discussed the evaluation the idea that she should get some imaging.  I would like to get a chest CT as well.  Although I think it is highly unlikely that she has a vascular disruption such as aortic dissection or aneurysm the story of the bulging of the right neck in November makes me a little bit concerned and that is why I think we need to scan.  Hopefully this will all be done today.        PLAN:   1. Chest, spine, rib, X rays today  2. Chest CT today  3. I will contact patient with results.     Lefty Matthew MD

## 2024-03-08 NOTE — TELEPHONE ENCOUNTER
[facility] calling to report urgent finding from CTA 03/08/24:    Impression 2. Diffuse metastatic lesions throughout the liver. 4 cm pancreatic tail mass likely is the primary malignancy, however the entire abdomen and pelvis is not included in the field-of-view.    Secure message to     [id number]  acknowledging urgent finding and states he has discussed with pt and started workup.

## 2024-03-08 NOTE — DISCHARGE INSTRUCTIONS

## 2024-03-08 NOTE — PROGRESS NOTES
DIAGNOSIS: Ms. Lam is 68 year old woman with a history of Stage I, node negative, breast cancer.  It was multifocal.  ER, NJ positive, grade 1, negative for angiolymphatic invasion. One tumor was 2 cm, another was 0.5.  Had a low Oncotype score of 7.  She was treated with bilateral mastectomy, oophorectomy, started Tamoxifen in 2006 and took it for 5 years.  She has now been on extended endocrine therapy with Arimidex since June 2011.  She had been receiving q 6 month zolendronate but this was stopped Dec 2013 as it might have been causing her rib musculskeletal symptoms.  She switched to tamoxifen in June 2017. She stopped tamoxifen in December 2021.     INTERIM HISTORY:  Ms. Lam returns for an unscheduled visit.  At the end of January she fell in her home in the [place] area.  It was a significant fall and hurt her knees and she broke her fall with her left arm although she did not have any fractures.  At that time she had an emergency department visit and they got rib films on the left which was unremarkable.    Even though she was in discomfort she opted to go on a  trip that she had previously planned.  During that time her pain got worse.  She describes it is migrating from the left ribs to her back.  She could not sleep and states that she is not eating well because of the pain.  She went to [place] and visited in the emergency department.  Apparently they got a EKG but did not draw any blood work to examine myocardial damage.  The EKG was normal.  The patient opted to return home.  She could not get a appointment with her primary care provider until towards the end of the month.  Because the pain was significant she decided to come and see me for further evaluation.    As noted the pain is covers her sternum and both ribs bilaterally.  She states it is constant and it does not have the features of cardiac pain.    Prior to this fall in November 2023 she called us while she was in [place]  "with prominence in her right neck.  We advised her to go to an emergency department or primary care physician to evaluate this.  Of note is that she sent a photograph to a relative who was a physician and brought up the possibility that this could be a aneurysm.  However she had Doppler evaluation of her carotid and jugular system at that time which showed no abnormality.  These results are in care everywhere.    She states that her lip symptoms are not improved, we did not focus on this today.       PHYSICAL EXAMINATION:  BP (!) 170/100   Pulse 95   Temp 97.7  F (36.5  C) (Oral)   Resp 14   Wt 40.6 kg (89 lb 8 oz)   SpO2 99%   BMI 16.37 kg/m    GENERAL: She looks uncomfortable moving. She is sitting with a pillow  BACK/SPINE/CHEST: I cannot     PROBLEMS:   1.  History of ER positive breast cancer on extended therapy since about 02/2006. OncotypeDx score = 7  2.  Textured breast implants - recent revision of the left breast implant  3.  Low bone density/Osteoporosis on outside scan   4. H/O GERD worse with emesis episode noted above  5. Intermittent SOB  6. Lower lip swelling - uncertain etiology - diagnosed as \"burning mouth syndrome\"  7. Diagnosis of enterococcus soft tissue infection of lip  8. Low back pain  9. New back and rib pain after traumatic fall        IMPRESSION:   Today she comes for an unscheduled visit to help evaluate her chest pain.  I told her I think is primarily musculoskeletal in nature and I wonder if she fractured something during the fall.  Of note is that the pain is severe enough that she is not eating well or sleeping.  She has a peculiar history of vascular prominence in her neck prior to this trauma.  It looks like she had a reasonable cardiac evaluation several times and this does not seem to be the source of the pain or the nature of the pain.    Today we discussed the evaluation the idea that she should get some imaging.  I would like to get a chest CT as well.  Although I " think it is highly unlikely that she has a vascular disruption such as aortic dissection or aneurysm the story of the bulging of the right neck in November makes me a little bit concerned and that is why I think we need to scan.  Hopefully this will all be done today.        PLAN:   1. Chest, spine, rib, X rays today  2. Chest CT today  3. I will contact patient with results.     Lefty Matthew MD

## 2024-03-11 NOTE — CONSULTS
Outpatient IR Biopsy Referral    Patient is a 69 y/o female with a PMH of breast cancer s/p bilateral mastectomy, oophorectomy, tamoxifen 2006, chest/musculoskeletal pain work up imaging notes CT images suggestive of metastatic pancreatic cancer. IR has been asked to biopsy a liver lesion IR providers choice for concerns of malignancy.           CT 3/8/24 Impression:    1. No acute abnormality in the chest. Normal thoracic aorta. No  pulmonary embolus. Clear lungs.    2. Diffuse metastatic lesions throughout the liver. 4 cm pancreatic  tail mass likely is the primary malignancy, however the entire abdomen  and pelvis is not included in the field-of-view.    3. Dilation of the left atrium may reflect mitral valve regurgitation.    [Access Center: Metastasis throughout the liver, suspicious for a  primary pancreatic tail mass]    In the partially visualized upper abdomen, multiple predominantly rim  enhancing hepatic masses, for example 4 cm in segment 5 on series 12  image 106. There is a 4 cm hypoenhancing pancreatic tail mass. The  spleen demonstrates heterogeneous enhancement which likely relates to  early arterial phase imaging. Partial visualization of a large simple  appearing left renal cyst.      Case and imaging was reviewed with Dr. Garcia and Dr. Aponte from IR and US guided biopsy of a lfver lesion IR providers choice is approved. Series 12 Image 106      Procedure order, surgical pathology and leukemia lymphoma orders placed.    Patient not on AC at time of referral.     If requesting team would like samples sent for anything else please enter them prior to scheduled procedure.    Primary team Dr. Matthew made aware of IR recommendations via epic messaging.    Dinorah THOMPSON  Interventional Radiology   IR on-call pager: [phone]

## 2024-03-11 NOTE — PROGRESS NOTES
[facility]: Cancer Care                                                                                        Received message from Dr. Matthew.  He has sent in a Rx for Oxycodone 5 mg tabs to help manage Abigail's pain.  Rx needs a PA.   Message routed via TE to PA pool.  Called PA team.  They will work on getting PA approved.  [facility] closes at 6 pm.  PA may not be done in time.  Spoke with Abigail.  The other option would be for them to pay out of pocket for 2 days of oxycodone in order to help manage pain and allow for PA approval.  Abigail is sending her  to the pharmacy to either  for Rx (if approved) or a few day supply. Liver biopsy being done 3/12 and PET on 3/15.     Keturah Harmon RN

## 2024-03-11 NOTE — TELEPHONE ENCOUNTER
PA approved via phone.  Patient notified of approval.   was able to  7 day supply today.  Patient will be able to fill a 30 day supply 7 days from today.  I will document dates of approval once PA team receives the determination fax from insurance.

## 2024-03-11 NOTE — TELEPHONE ENCOUNTER
Retail Pharmacy Prior Authorization Team   Phone: [phone]    PA Initiation    Medication: OXYCODONE  Insurance Company: [employer] - Phone [phone] Fax [phone]  Pharmacy Filling the Rx: [id number] - [place], [place] - [address]  Filling Pharmacy Phone: [phone]  Filling Pharmacy Fax: [phone]  Start Date: 3/11/2024       Weight loss.../Inadequate energy intake...

## 2024-03-11 NOTE — TELEPHONE ENCOUNTER
[facility]: Cancer Care                  Prior Auth needed for new prescription of Roxicodone 5 mg.   New diagnosis of Pancreatic Mass                       Keturah Harmon RN

## 2024-03-12 NOTE — PROGRESS NOTES
Patient Name: Abigail Lam  Medical Record Number: [id number]  Today's Date: 3/12/2024    Procedure: Liver lesion biopsy  Proceduralist: Dr. Kim and Dr. Mott  Pathology present: No    Procedure Start: 0927  Procedure end: 0946  Sedation medications administered: Fentanyl 200 mcg and Versed 4 mg     Report given to: Shi GARCIA  : KELLY    Other Notes: Pt arrived to IR room 6 from . Consent reviewed. Pt denies any questions or concerns regarding procedure. Pt positioned supine and monitored per protocol. Pt tolerated procedure without any noted complications. Pt transferred back to .

## 2024-03-12 NOTE — H&P
Since the patients last visit with their PCP the patient has not started taking any new medications or had any new surgeries. She has had a small amount of weight loss over the last week. She has some vision flashes for which she is seeing a retinal specialist. She has had some chest/back pain which prompted her CT scan which discovered her pancreatic tumor and liver masses. A 10 point ROS is performed and the patient has no new complaints.     I agree with the information in this note.

## 2024-03-12 NOTE — DISCHARGE INSTRUCTIONS
[facility]    Interventional Radiology  Patient Instructions Following Biopsy    AFTER YOU GO HOME  If you were given sedation, for the first 24 hours: we recommend that an adult stay with you, DO NOT drive or operate machinery at home or at work, DO NOT smoke, DO NOT make any important or legal decisions.  DO NOT drink alcoholic beverages the day of your procedure  Drink plenty of fluids   Resume your regular diet, unless otherwise instructed by your Primary Physician  Relax and take it easy for 48 hours  DO NOT do any strenuous exercise or lifting (> 10 lbs) for at least 7 days following your procedure  There should be minimal drainage from the biopsy site  Keep the dressing dry and in place for 24 hours. Remove the dressing after 24 hours. You may shower at that time. Re apply a band aid to the site for the next 2 days. Change daily and when necessary. Never leave a wet or dirty dressing in place.  No tub bath, hot tub, or swimming for 5 days.  No lotion or powder to the puncture site for 5 days.     CALL THE PHYSICIAN IF:  You start bleeding from the procedure site.  If you do start to bleed from that site, lie down flat and hold pressure on the site for a minimum of 10 minutes.  Your physician will tell you if you need to return to the hospital  You develop nausea or vomiting  You have excessive swelling, redness, or tenderness at the site  You have drainage that looks like it is infected.  You experience severe pain  You develop hives or a rash or unexplained itching  You develop shortness of breath  You develop a temperature of 101 degrees F or greater  You develop bloody clots or red urine after you are discharged  You develop chest pain or cough up blood, lightheadedness or fainting    ADDITIONAL INSTRUCTIONS  If you are taking Coumadin, restart tonight.  Follow up with your Coumadin Clinic or Primary Care MD to have your INR rechecked.    [facility] INTERVENTIONAL RADIOLOGY  DEPARTMENT  Procedure Physician: Dr. Mott, Dr. Kim                                      Date of procedure: March 12, 2024  Telephone Numbers: [phone]      Monday-Friday 7:30 am to 4:00 pm  [phone] After 4:00 pm Monday-Friday, Weekends & Holidays.   Ask the  to contact the Interventional Radiologist on call.  Someone is on call 24 hrs/day  [facility] [facility] toll free number: [phone]    Monday-Friday 8:00 am to 4:30 pm  [facility] Emergency Dept: [phone]

## 2024-03-12 NOTE — PROGRESS NOTES
Pt arrived on 2a post liver biopsy. VSS 90% Ra. O2 applied - 2L. Dressing c/d/I. No pain. Family at bedside. Pt resting.

## 2024-03-12 NOTE — PROCEDURES
[facility]    Procedure: IR Procedure Note    Date/Time: 3/12/2024 9:49 AM    Performed by: Yeyo Kim MD  Authorized by: Yeyo Kim MD  IR Fellow Physician:  Radiology Resident Physician: Yeyo Kim  Other(s) attending procedure: Reinaldo Mott      UNIVERSAL PROTOCOL   Site Marked: NA  Prior Images Obtained and Reviewed:  Yes  Required items: Required blood products, implants, devices and special equipment available    Patient identity confirmed:  Verbally with patient, arm band, provided demographic data and hospital-assigned identification number  Patient was reevaluated immediately before administering moderate or deep sedation or anesthesia  Confirmation Checklist:  Patient's identity using two indicators, relevant allergies, procedure was appropriate and matched the consent or emergent situation and correct equipment/implants were available  Time out: Immediately prior to the procedure a time out was called    Universal Protocol: the Joint Commission Universal Protocol was followed    Preparation: Patient was prepped and draped in usual sterile fashion       ANESTHESIA    Anesthesia:  Local infiltration  Local Anesthetic:  Lidocaine 1% without epinephrine      SEDATION  Patient Sedated: Yes    Sedation Type:  Moderate (conscious) sedation  Sedation:  Fentanyl and midazolam  Vital signs: Vital signs monitored during sedation    See dictated procedure note for full details.  Findings: Liver lesion biopsy 4 cores obtained    Specimens: core needle biopsy specimens sent for pathological analysis    Complications: None    Condition: Stable    Plan: 2 hours bedrest then okay to discharge      PROCEDURE  Describe Procedure: Liver lesion biopsy 4 cores obtained  Patient Tolerance:  Patient tolerated the procedure well with no immediate complications  Length of time physician/provider present for 1:1 monitoring during sedation: 20

## 2024-03-12 NOTE — PROGRESS NOTES
Bedrest recovery time completed without complications. Pt tolerated PO. Pt tolerated ambulation SBA to BR. Able to void without complications. R abd procedure site remained stable, unchanged. Discharge instructions reviewed with pt & pt's . Questions & concerns addressed. PIV discontinued. Pt dressed self. Pt left unit in WC with this RN to meet  at main entrance to discharge home. Pt stable.

## 2024-03-12 NOTE — TELEPHONE ENCOUNTER
Prior Authorization Approval    Authorization Effective Date: 2/10/2024  Authorization Expiration Date: 3/11/2025  Medication: OXYCODONE - APPROVED  Approved Dose/Quantity:    Reference #:     Insurance Company: [employer] - Phone [phone] Fax [phone]  Expected CoPay:       CoPay Card Available:      Foundation Assistance Needed:    Which Pharmacy is filling the prescription (Not needed for infusion/clinic administered): [id number] [place] [place] [facility] [address]  Pharmacy Notified:  YES  Patient Notified:  YES

## 2024-03-12 NOTE — PROGRESS NOTES
"Arrived to Unit 2a for US liver biopsy procedure in IR. AFVSS. C/o mid back pain \"4/10, pressure.\" Pt currently repositioning and using heat to help pain. Labs processing. Pt's  at bedside. Pt stable & ready for consent.   "

## 2024-03-12 NOTE — PRE-PROCEDURE
GENERAL PRE-PROCEDURE:   Procedure:  Liver lesion biopsy  Date/Time:  3/12/2024 8:50 AM    Verbal consent obtained?: Yes    Written consent obtained?: Yes    Risks and benefits: Risks, benefits and alternatives were discussed    Consent given by:  Patient  Patient states understanding of procedure being performed: Yes    Patient's understanding of procedure matches consent: Yes    Procedure consent matches procedure scheduled: Yes    Expected level of sedation:  Moderate  Appropriately NPO:  Yes  ASA Class:  2  Mallampati  :  Grade 2- soft palate, base of uvula, tonsillar pillars, and portion of posterior pharyngeal wall visible  Lungs:  Lungs clear with good breath sounds bilaterally  Heart:  Normal heart sounds and rate  History & Physical reviewed:  History and physical reviewed and no updates needed  Statement of review:  I have reviewed the lab findings, diagnostic data, medications, and the plan for sedation

## 2024-03-14 NOTE — PROGRESS NOTES
[facility]: Cancer Care                                                                                        Caris submitted on [id number] per request of Dr. Matthew.     Keturah Harmon RN

## 2024-03-15 NOTE — TELEPHONE ENCOUNTER
Left message for patient, orders not placed yet    Per Dr Das:  Procedure/Imaging/Clinic: Upper EUS with celiac neurolysis   Physician: Thiago   Timing: Next available   Scope time needed:15 minutes   Anesthesia:MAC   Dx: Metastatic pancreatic cancer. Pain   Tier:Tier 2 - Not life/limb threatening but potential for  patient morbidity/mortality or adverse  patient/disease outcome if  surgery/procedure not done within 30 day   Location: [facility]   Header of letter for pt communication:   Endoscopy with ultrasound, nerve injection for pain relief.

## 2024-03-15 NOTE — PATIENT INSTRUCTIONS
Medicine regimen:      Zofran:  Take 4 mg twice a day. You can take 2 extra during the day as needed.    Famotidine: 40 mg at night    Omeprazole: take one in morning.  Ok to take a 2nd dose as needed at night for heartburn/nausea    Oxycodone:  I seriously think you'll need something like 4 or more tablets a day of this for your pain to be well-managed. I hope I am wrong. Please try to take it at least twice a day. It can be taken safely every 4 hours as needed as long as you are not feeling sedated.    Dexamethasone  This is an antiinflammation medicine that reduces pain, improves energy and appetite.  4 mg by mouth every morning--take with food.  If you really don't feel any effects from this--go ahead and take 2 of them (8mg every morning) each morning.  We'll talk in the coming weeks about whether we'll continue this long term    Thank you for meeting with us in the [facility].    How to get a hold of us:  MyChart is good for concerns that are not urgent (questions that can take a couple days to answer).    For more urgent matters, or if you prefer not to use MyChart, call our clinic nurse Sandra Lewis RN at [phone].     We have an on-call number for evenings and weekends. Please call this only if you are having uncontrolled symptoms or serious side effects from your medicines: [phone].     For scheduling, call [phone]    For refills, please give us a week (5 working days) notice. We don't always have providers available everyday to do refills. If you call the day you run out of your medicine, we may not be able to refill it in time, so call 5 days in advance!

## 2024-03-15 NOTE — LETTER
3/15/2024       RE: Abigail Lam  [address]  [facility] [address]     Dear Colleague,    Thank you for referring your patient, Abigail Lam, [facility]. Please see a copy of my visit note below.    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  3/2024 found to have metastatic pancreatic adenocarcinoma    Social - Lives with  Luis F. 2 children; grandchildren    Care Planning -       History:  History gathered today from: patient, family/loved ones, medical chart, medical team members    I reviewed the patient's medical history in the chart and confirmed key details today with them; summarized above.  I reviewed with them the various roles of our palliative care program; qol support, sx management, mood/coping/counseling, and care planning.  The patient was referred with a focus on pain.    Dr Matthew asked us to see her asap this week in the context of newly dx pancreatic cancer and intractable pain.   She has been in her normal state of health until recently. In retrospect perhaps some taste alterations the last several months. However in the last few weeks marked onset of low appetite, dysgeusia, and severe back and abd pain. Was in the [place] and cut her trip short. Eval here unfortunately shows a metastatic carcinoma, panc body mass, liver mets, peritoneal nodules and bx c/w upper GI adenocarcinoma.    She's just found out all this this week and is in a state of shock about it.     Pain  Epigastric and bilat spine at ~T12 level. Intractable. Worsening.  Taking ibuprofen & APAP with minimal help.  Dr Matthew Rx'd oxycodone 5 mg: has taken one tablet overnight for two nights. Feels a small amount of relief with this.  She has a long-standing  "hx of chronic nausea, dyspepsia, and heartburn. Is on PPI and H2B daily long term and zofran. Oxycodone worsened her nausea.   Rapidly dropping appetite, taste alterations, and fatigue.     PE: BP (!) 144/79 (BP Location: Right arm, Patient Position: Chair, Cuff Size: Adult Small)   Pulse 99   Temp 98.5  F (36.9  C) (Tympanic)   Resp 16   Ht 1.544 m (5' 0.79\")   Wt 39.6 kg (87 lb 4.8 oz)   SpO2 99%   BMI 16.61 kg/m     Wt Readings from Last 3 Encounters:   03/15/24 39.6 kg (87 lb 4.8 oz)   03/15/24 39.6 kg (87 lb 3.2 oz)   03/12/24 40.5 kg (89 lb 4.6 oz)     Alert NAD  Clear sensorium  Congruent affect      Data reviewed:  I reviewed recent labs and imaging, my comments:  CBC normal  INR 1.19    PET  PET/CT FINDINGS: There is an FDG avid centrally necrotic lesion in the distal pancreatic body/tail which measures at least 3.5 x 2.0 cm (SUV max 7.3) with innumerable FDG avid liver lesions (representative lesion in the periphery of the left hepatic lobe   measures 3.6 x 3.2 cm SUV max 12.5 with central necrosis), FDG avid glenys hepatis/portacaval lymph nodes (SUV max 13.2), scattered small FDG avid peritoneal nodules (for example the anterior left pelvis measuring 1.7 x 1.2 cm SUV max 9.7), mildly FDG   avid ascites (SUV max 2.4), and osseous lesion in the right para midline sacrum (SUV max 6.8). Findings are suspicious for primary pancreatic neoplasm with metastatic disease in the liver, upper abdominal lymph nodes, peritoneum, and a single site of the   skeleton.     database reviewed: y      Impression & Recommendations:  67 yo with newly diagnosed metastatic pancreatic adenocarcinoma; intractable pain    Pain:  D/w her the natural hx of this. I am confident we can get her pain improved. D/w her she'll likely need opioids and for most people the sedating and nauseating effects of them rapidly attenuate if someone takes them regularly so I'm hoping she can build up a tolerance to them quickly here. I " suggested I thought it quite possible she'd need 4-8 of the oxycodone tabs a day to get a good analgesic response and she seemed very shocked to hear that and we discussed that (eg 10 mg qid) is a totally normal dose for her situation. I encouraged her to try to increase her dose up to a point she's getting some relief.    --Will add decadron 4-8 mg qam; for pain, appetite, fatigue, mood. D/w her we'll need to decide in a couple weeks what we'll do with that long term. She's unclear today if she'll pursue cancer tx and if she does we'll taper that off; if she doesn't it may make sense to leave it on if it helps a lot.    --GI referral for possible celiac plexus neurlysis    Severe OIC  --Senna    Cancer, grief  We discussed her grief and shock; this is an awful situation and she is understandably reeling.  She sees Dr De La Garza in a week to discuss treatment. She observes today she's not sure if she wants treatment--she might--but it's tough to even think about it while her pain is so severe, understandably so. Her mother in law chose not to treat her pancreatic cancer and Abigail observed that she had an excellent qol for most of that time. I d/w her I think we can get her pain better, and we'll support her as she takes the time she needs to decide on treating the cancer, and not treating the cancer actively is reasonable if that's what she ultimately or eventually decides.    Follow-up 2 weeks. D/w Dr Matthew today in clinic.   We'll call her early next week.     PATIENT INSTRUCTIONS TODAY:  Zofran:  Take 4 mg twice a day. You can take 2 extra during the day as needed.    Famotidine: 40 mg at night    Omeprazole: take one in morning.  Ok to take a 2nd dose as needed at night for heartburn/nausea    Oxycodone:  I seriously think you'll need something like 4 or more tablets a day of this for your pain to be well-managed. I hope I am wrong. Please try to take it at least twice a day. It can be taken safely every 4 hours as  needed as long as you are not feeling sedated.    Dexamethasone  This is an antiinflammation medicine that reduces pain, improves energy and appetite.  4 mg by mouth every morning--take with food.  If you really don't feel any effects from this--go ahead and take 2 of them (8mg every morning) each morning.  We'll talk in the coming weeks about whether we'll continue this long term        Over 90 minutes spent on the date of the encounter doing chart review, history and exam, patient education & counseling, documentation and other activities as noted above.    Thank you for involving us in the patient's care.   Hunter Schmidt MD / Palliative Medicine / Text me via [employer]  This note may have been composed with voice recognition software and there may be mistranscriptions.        Again, thank you for allowing me to participate in the care of your patient.      Sincerely,    Hunter Schmidt MD

## 2024-03-15 NOTE — NURSING NOTE
"Oncology Rooming Note    March 15, 2024 11:29 AM   Abigail Lam is a 68 year old female who presents for:    Chief Complaint   Patient presents with    Oncology Clinic Visit     [facility] RETURN - BREAST CANCER     Initial Vitals: BP (!) 144/79 (BP Location: Right arm, Patient Position: Chair, Cuff Size: Adult Small)   Pulse 99   Temp 98.5  F (36.9  C) (Tympanic)   Resp 16   Ht 1.544 m (5' 0.79\")   Wt 39.6 kg (87 lb 3.2 oz)   SpO2 99%   BMI 16.59 kg/m   Estimated body mass index is 16.59 kg/m  as calculated from the following:    Height as of this encounter: 1.544 m (5' 0.79\").    Weight as of this encounter: 39.6 kg (87 lb 3.2 oz). Body surface area is 1.3 meters squared.  Moderate Pain (5) Comment: Data Unavailable   No LMP recorded. Patient is postmenopausal.  Allergies reviewed: Yes  Medications reviewed: Yes    Medications: Medication refills not needed today.  Pharmacy name entered into uchoose:    [facility] - [place], [place] - [address] [id number] - [place], [place] - [address] [facility] [address] [facility]  [id number] - [place], [place] - [address] [id number] - [place], [place] - [address]    Frailty Screening:   Is the patient here for a new oncology consult visit in cancer care? 2. No    Adonay Galvan LPN              "

## 2024-03-15 NOTE — PROGRESS NOTES
DIAGNOSIS: Ms. Lam is 68 year old woman with a history of Stage I, node negative, breast cancer.  It was multifocal.  ER, FL positive, grade 1, negative for angiolymphatic invasion. One tumor was 2 cm, another was 0.5.  Had a low Oncotype score of 7.  She was treated with bilateral mastectomy, oophorectomy, started Tamoxifen in 2006 and took it for 5 years.  She has now been on extended endocrine therapy with Arimidex since June 2011.  She had been receiving q 6 month zolendronate but this was stopped Dec 2013 as it might have been causing her rib musculskeletal symptoms.  She switched to tamoxifen in June 2017. She stopped tamoxifen in December 2021.    Recent biopsy and work up suggested metastatic pancreas cancer to the liver.      INTERIM HISTORY:  Ms. Lam returns after a week.  As noted her chest CT last week showed multiple liver lesions and concern for pancreatic mass.    On March 12, 2024 she had ultrasound directed biopsy.  This showed an adenocarcinoma with focal mucin production moderately differentiated.  It was felt that this was an origin from pancreas, upper GI tract and had a biliary size.  It was felt not to be a breast cancer.    She also had a PET/CT done on March 15, 2024.  This demonstrated multiple liver lesions and a small area in the sacrum.  There are also a necrotic lesion in her distal pancreas body/tail which is 3.5 x 2.0 cm.  She in addition had some lymph node involvement as well as some small peritoneal nodules.    Once it was clear that the etiology of her pain was evident she was prescribed 5 mg oxycodone which did not manage the pain.  She returns today for additional discussion about her choices.    She made it clear that her primary concern is quality of life.  She relates a story of her mother-in-law who opted for no therapy except for pain control for pancreatic cancer and lives 6 months.  Her  who is with her today also related the story of her friend and colleague who  "is cared for here and had about 2 years of good quality life.  The bulk of our discussion today revolved around our priorities.  This included better pain management, find an effective chemotherapy regimen that would reduce the tumor size and help with pain and prolong her overall quality of life.  To that end she has several different appointments today as well as next week to try to address this.    We discussed her at breast conference today.  She had a 6 gene breast panel about 10 years ago so this needs to be repeated for germline testing.    ROS is mostly unchanged. She still has poor sleep, pain, little appetite.    SOCIAL HISTORY: Her son is here from [place].  Some of the family is concerned about the distance.  We discussed the idea that if she were to start therapy it might be good to have her family close by.       PHYSICAL EXAMINATION:  BP (!) 144/79 (BP Location: Right arm, Patient Position: Chair, Cuff Size: Adult Small)   Pulse 99   Temp 98.5  F (36.9  C) (Tympanic)   Resp 16   Ht 1.544 m (5' 0.79\")   Wt 39.6 kg (87 lb 3.2 oz)   SpO2 99%   BMI 16.59 kg/m    GENERAL: She looks uncomfortable moving. She is sitting with a pillow  ABDOMEN: Her liver is extended about 3 finger breadths below her costochondral margin it is minimally tender.  BACK: She has focal discomfort over her thoracic spine but PET showed no lesion.    LABS: Pending  IMAGING: As above, PET CT showed metastatic pancreas cancer     No evidence of bony lesion on plain films and CT except for sacram    PROBLEMS:   1.  History of ER positive breast cancer on extended therapy since about 02/2006. OncotypeDx score = 7  2.  Metastatic pancreatic cancer with pain in her back and chest. Liver, nodes, peritoneal space and sacrum involved.  3. Textured breast implants - recent revision of the left breast implant  4.  Low bone density/Osteoporosis on outside scan   5. H/O GERD worse with emesis episode noted above  6. Lower lip " "swelling - uncertain etiology - diagnosed as \"burning mouth syndrome\"  7. Diagnosis of enterococcus soft tissue infection of lip      IMPRESSION:   Today we had a discussion about plans.  As noted below she will see palliative care later today and she was introduced to Dr. De La Garza who will manage her pancreas cancer.  She has significant concerns in several areas.  First she needs better pain management and does not want to have this quality of life with chronic pain.  Second if she is to start a regimen she does not want to reduce her quality of life.  Specifically she has significant problems in the past with a hiatal hernia and GERD.  She would need good nausea control if she were to entertain a cytotoxic regimen.    In light of that she also needs a port placement which we will try to get next week.  She comes for an unscheduled visit to help evaluate her chest pain.       PLAN:   Has appointment to see Dr. Schmidt today  Introduced to Dr. De La Garza today, has a formal appointment with him next week  Tumor sent to [facility], awaiting results  Hunter germline testing with results pending prior authorization.      Lefty Matthew MD        "

## 2024-03-15 NOTE — NURSING NOTE
"Oncology Rooming Note    March 15, 2024 12:48 PM   Abigail Lam is a 68 year old female who presents for:    Chief Complaint   Patient presents with    Oncology Clinic Visit     [facility] return - pancreatic mass / breast cancer     Initial Vitals: BP (!) 144/79 (BP Location: Right arm, Patient Position: Chair, Cuff Size: Adult Small)   Pulse 99   Temp 98.5  F (36.9  C) (Tympanic)   Resp 16   Ht 1.544 m (5' 0.79\")   Wt 39.6 kg (87 lb 4.8 oz)   SpO2 99%   BMI 16.61 kg/m   Estimated body mass index is 16.61 kg/m  as calculated from the following:    Height as of this encounter: 1.544 m (5' 0.79\").    Weight as of this encounter: 39.6 kg (87 lb 4.8 oz). Body surface area is 1.3 meters squared.  Moderate Pain (5) Comment: Data Unavailable   No LMP recorded. Patient is postmenopausal.  Allergies reviewed: Yes  Medications reviewed: Yes    Medications: Medication refills not needed today.  Pharmacy name entered into [employer]:    [facility] - [place], [place] - [address] [id number] - [place], [place] - [address] [facility]  [id number] - [place], [place] - [address] [facility][id number] - [place], [place] - [address]    Frailty Screening:   Is the patient here for a new oncology consult visit in cancer care? 1. Yes. Over the past month, have you experienced difficulty or required a caregiver to assist with:   1. Balance, walking or general mobility (including any falls)? NO  2. Completion of self-care tasks such as bathing, dressing, toileting, grooming/hygiene?  NO  3. Concentration or memory that affects your daily life?  NO     Adonay Galvan LPN              "

## 2024-03-15 NOTE — TELEPHONE ENCOUNTER
Advanced Endoscopy     Referring provider: Dr Schmidt    Referred to: [facility]     Provider Requested: na     Referral Received: 03/15/24       Records received: EPic     Images received: Pacs    Insurance Coverage:Medicare    Evaluation for: celiac plexus neurlysis for pain control     Clinical History (per RN review):     Patient w/ pancreatic cancer with liver mets. CT and PET scan done 03/15/24    Pain:  D/w her the natural hx of this. I am confident we can get her pain improved. D/w her she'll likely need opioids and for most people the sedating and nauseating effects of them rapidly attenuate if someone takes them regularly so I'm hoping she can build up a tolerance to them quickly here. I suggested I thought it quite possible she'd need 4-8 of the oxycodone tabs a day to get a good analgesic response and she seemed very shocked to hear that and we discussed that (eg 10 mg qid) is a totally normal dose for her situation. I encouraged her to try to increase her dose up to a point she's getting some relief.     --Will add decadron 4-8 mg qam; for pain, appetite, fatigue, mood. D/w her we'll need to decide in a couple weeks what we'll do with that long term. She's unclear today if she'll pursue cancer tx and if she does we'll taper that off; if she doesn't it may make sense to leave it on if it helps a lot.     --GI referral for possible celiac plexus neurlysis    MD review date: 03/15/24    MD Decision for clinic consultation/Orders:            Referral updates/Patient contacted:

## 2024-03-15 NOTE — PROGRESS NOTES
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  3/2024 found to have metastatic pancreatic adenocarcinoma    Social - Lives with  Luis F. 2 children; grandchildren    Care Planning -       History:  History gathered today from: patient, family/loved ones, medical chart, medical team members    I reviewed the patient's medical history in the chart and confirmed key details today with them; summarized above.  I reviewed with them the various roles of our palliative care program; qol support, sx management, mood/coping/counseling, and care planning.  The patient was referred with a focus on pain.    Dr Matthew asked us to see her asap this week in the context of newly dx pancreatic cancer and intractable pain.   She has been in her normal state of health until recently. In retrospect perhaps some taste alterations the last several months. However in the last few weeks marked onset of low appetite, dysgeusia, and severe back and abd pain. Was in the [place] and cut her trip short. Eval here unfortunately shows a metastatic carcinoma, panc body mass, liver mets, peritoneal nodules and bx c/w upper GI adenocarcinoma.    She's just found out all this this week and is in a state of shock about it.     Pain  Epigastric and bilat spine at ~T12 level. Intractable. Worsening.  Taking ibuprofen & APAP with minimal help.  Dr Matthew Rx'd oxycodone 5 mg: has taken one tablet overnight for two nights. Feels a small amount of relief with this.  She has a long-standing hx of chronic nausea, dyspepsia, and heartburn. Is on PPI and H2B daily long term and zofran. Oxycodone worsened her nausea.   Rapidly dropping appetite, taste alterations, and fatigue.     PE: BP (!) 144/79 (BP Location: Right arm, Patient Position: Chair, Cuff Size: Adult Small)   Pulse 99   Temp 98.5  " F (36.9  C) (Tympanic)   Resp 16   Ht 1.544 m (5' 0.79\")   Wt 39.6 kg (87 lb 4.8 oz)   SpO2 99%   BMI 16.61 kg/m     Wt Readings from Last 3 Encounters:   03/15/24 39.6 kg (87 lb 4.8 oz)   03/15/24 39.6 kg (87 lb 3.2 oz)   03/12/24 40.5 kg (89 lb 4.6 oz)     Alert NAD  Clear sensorium  Congruent affect      Data reviewed:  I reviewed recent labs and imaging, my comments:  CBC normal  INR 1.19    PET  PET/CT FINDINGS: There is an FDG avid centrally necrotic lesion in the distal pancreatic body/tail which measures at least 3.5 x 2.0 cm (SUV max 7.3) with innumerable FDG avid liver lesions (representative lesion in the periphery of the left hepatic lobe   measures 3.6 x 3.2 cm SUV max 12.5 with central necrosis), FDG avid glenys hepatis/portacaval lymph nodes (SUV max 13.2), scattered small FDG avid peritoneal nodules (for example the anterior left pelvis measuring 1.7 x 1.2 cm SUV max 9.7), mildly FDG   avid ascites (SUV max 2.4), and osseous lesion in the right para midline sacrum (SUV max 6.8). Findings are suspicious for primary pancreatic neoplasm with metastatic disease in the liver, upper abdominal lymph nodes, peritoneum, and a single site of the   skeleton.     database reviewed: y      Impression & Recommendations:  69 yo with newly diagnosed metastatic pancreatic adenocarcinoma; intractable pain    Pain:  D/w her the natural hx of this. I am confident we can get her pain improved. D/w her she'll likely need opioids and for most people the sedating and nauseating effects of them rapidly attenuate if someone takes them regularly so I'm hoping she can build up a tolerance to them quickly here. I suggested I thought it quite possible she'd need 4-8 of the oxycodone tabs a day to get a good analgesic response and she seemed very shocked to hear that and we discussed that (eg 10 mg qid) is a totally normal dose for her situation. I encouraged her to try to increase her dose up to a point she's getting some " relief.    --Will add decadron 4-8 mg qam; for pain, appetite, fatigue, mood. D/w her we'll need to decide in a couple weeks what we'll do with that long term. She's unclear today if she'll pursue cancer tx and if she does we'll taper that off; if she doesn't it may make sense to leave it on if it helps a lot.    --GI referral for possible celiac plexus neurlysis    Severe OIC  --Senna    Cancer, grief  We discussed her grief and shock; this is an awful situation and she is understandably reeling.  She sees Dr De La Garza in a week to discuss treatment. She observes today she's not sure if she wants treatment--she might--but it's tough to even think about it while her pain is so severe, understandably so. Her mother in law chose not to treat her pancreatic cancer and Abigail observed that she had an excellent qol for most of that time. I d/w her I think we can get her pain better, and we'll support her as she takes the time she needs to decide on treating the cancer, and not treating the cancer actively is reasonable if that's what she ultimately or eventually decides.    Follow-up 2 weeks. D/w Dr Matthew today in clinic.   We'll call her early next week.     PATIENT INSTRUCTIONS TODAY:  Zofran:  Take 4 mg twice a day. You can take 2 extra during the day as needed.    Famotidine: 40 mg at night    Omeprazole: take one in morning.  Ok to take a 2nd dose as needed at night for heartburn/nausea    Oxycodone:  I seriously think you'll need something like 4 or more tablets a day of this for your pain to be well-managed. I hope I am wrong. Please try to take it at least twice a day. It can be taken safely every 4 hours as needed as long as you are not feeling sedated.    Dexamethasone  This is an antiinflammation medicine that reduces pain, improves energy and appetite.  4 mg by mouth every morning--take with food.  If you really don't feel any effects from this--go ahead and take 2 of them (8mg every morning) each morning.  We'll  talk in the coming weeks about whether we'll continue this long term        Over 90 minutes spent on the date of the encounter doing chart review, history and exam, patient education & counseling, documentation and other activities as noted above.    Thank you for involving us in the patient's care.   Hunter Schmidt MD / Palliative Medicine / Text me via [employer]  This note may have been composed with voice recognition software and there may be mistranscriptions.   diminished

## 2024-03-15 NOTE — NURSING NOTE
"Oncology Rooming Note    March 15, 2024 11:05 AM   Abigail Lam is a 68 year old female who presents for:    Chief Complaint   Patient presents with    Oncology Clinic Visit     [facility] RETURN - BREAST CANCER     Initial Vitals: BP (!) 144/79 (BP Location: Right arm, Patient Position: Chair, Cuff Size: Adult Small)   Pulse 99   Temp 98.5  F (36.9  C) (Tympanic)   Resp 16   Ht 1.544 m (5' 0.79\")   Wt 39.6 kg (87 lb 3.2 oz)   SpO2 99%   BMI 16.59 kg/m   Estimated body mass index is 16.59 kg/m  as calculated from the following:    Height as of this encounter: 1.544 m (5' 0.79\").    Weight as of this encounter: 39.6 kg (87 lb 3.2 oz). Body surface area is 1.3 meters squared.  Moderate Pain (5) Comment: Data Unavailable   No LMP recorded. Patient is postmenopausal.  Allergies reviewed: Yes  Medications reviewed: Yes    Medications: Medication refills not needed today.  Pharmacy name entered into [employer]:    [facility] DISCHAR[facility] - [place], [place] - [address] [facility] [id number] - [place], [place] - [address] [facility]  [id number] - CLOSED - [place], [place] - [address] [id number] - [place], [place] - [address]    Frailty Screening:   Is the patient here for a new oncology consult visit in cancer care? 2. No    Adonay Galvan LPN              "

## 2024-03-15 NOTE — TUMOR CONFERENCE
Tumor Conference Information  Tumor Conference: Breast  Specialties Present: Medical oncology, Radiation oncology, Pathology, Radiology, Surgery  Patient Status: Prospective  Stage: Stage I  Treatment to Date: Biopsy, Hormonal therapy, Surgery  Clinical Trials: Not discussed  Genetic Testing Discussed/Recommended?: No  Supportive Care Services Discussed/Recommended?: No  Recommended Plan: Follows evidence-based guidelines  Did the review exceed 30 minutes?: did not       Discussed whether new mass in pancreas and liver mets is a new primary or related to breast cancer.  Most likely pancreatic is a new primary.  PET today and appt with Dr. Matthew.     Keturah Harmon, JOSE      Documentation / Disclaimer Cancer Tumor Board Note  Cancer tumor board recommendations do not override what is determined to be reasonable care and treatment, which is dependent on the circumstances of a patient's case; the patient's medical, social, and personal concerns; and the clinical judgment of the oncologist [physician].

## 2024-03-18 NOTE — TELEPHONE ENCOUNTER
"At the request of Dr. Schmidt, patient called to check in and see how she is doing.  Patient reports she is doing \"awful.\"  Reports last night and the night before she was up all night with pain.  Reports intense stomach, chest and low back pain and no energy.  Reports oxycodone 5 mg makes her \"pass out\" but does not take away the pain and needs to take another oxycodone at the 4-hour michaela.  She has only been taking 2 oxycodone per day and does not want to take anymore.  Also reports that oxycodone dries her mouth out.  Reports she has not had a bowel movement in approximately 2 weeks.  She has been taking senna 2 tabs in the morning, Metamucil and Preparation H suppositories.  She feels like some of this pain is due to constipation.  Reports she is worried about nausea and has been taking Zofran although she is not really eating or drinking anything.  Reports she has no appetite.  Reports she \"s \"so depressed.\"  She is using Ativan and Klonopin that was prescribed by her psychiatrist with no relief.    Writer discussed oxycodone dosing with patient and encouraged her to use oxycodone every 4 hours for pain.  Discussed stopping Metamucil and adding MiraLAX 1 capful twice daily for constipation, increasing senna to 2 tabs 3 times daily or 3 tabs twice daily and trying a suppository or fleets enema.  Once bowels start moving patient can titrate down senna and MiraLAX.    Patient requested that Dr. Schmidt be notified that she is interested in a nerve block and wanted writer to let Dr. Matthew know she is very upset over Fridays meeting and would like an appointment with him ASAP.    Will route message to Dr. Schmidt and oncology team.    Patient verbalized understanding and had no further questions.    Sandra Lewis, RN  Palliative Care Nurse Clinician    [phone] (Direct)  [phone] (Main)  [phone] (Appointment Scheduling)      ADDENDUM:  Patient and patient's  Kvein notified   Dr. Schmidt placed " an asap CPN order on Friday.  Writer alerted them that GI will be calling to schedule and if they miss the call they should call back right away.    Dr. Schmidt placed an order for Butrans patch.  Writer reviewed dosing, side effects and answered all questions relating to Butrans and taking oxycodone while on Butrans patch.      Patient reports she has not received a call from  to schedule CPN.  She will keep her phone by her in case they call to schedule.    Patient reports she has not started Decadron yet, but will start it tomorrow.    Will update Dr. Schmidt, oncology and GI

## 2024-03-19 NOTE — TELEPHONE ENCOUNTER
"Endoscopy Scheduling Screen    Have you had a positive Covid test in the last 14 days?  No    What is your communication preference for Instructions and/or Bowel Prep?   MyChart    What insurance is in the chart?  Other:  MEDICARE, BCBS    Ordering/Referring Provider: CHAVEZ CLARKE    (If ordering provider performs procedure, schedule with ordering provider unless otherwise instructed. )    BMI: Estimated body mass index is 16.61 kg/m  as calculated from the following:    Height as of 3/15/24: 1.544 m (5' 0.79\").    Weight as of 3/15/24: 39.6 kg (87 lb 4.8 oz).     Sedation Ordered  MAC/deep sedation.   BMI<= 45 45 < BMI <= 48 48 < BMI < = 50  BMI > 50   No Restrictions No [facility]  No [facility]  [facility] with exceptions Hospital Only OR Only       Do you have a history of malignant hyperthermia?  No    (Females) Are you currently pregnant?   No     Have you been diagnosed or told you have pulmonary hypertension?   No    Do you have an LVAD?  No    Have you been told you have moderate to severe sleep apnea?  No    Have you been told you have COPD, asthma, or any other lung disease?  No    Do you have any heart conditions?  No     Have you ever had or are you waiting for an organ transplant?  No. Continue scheduling, no site restrictions.    Have you had a stroke or transient ischemic attack (TIA aka \"mini stroke\" in the last 6 months?   No    Have you been diagnosed with or been told you have cirrhosis of the liver?   No    Are you currently on dialysis?   No    Do you need assistance transferring?   No    BMI: Estimated body mass index is 16.61 kg/m  as calculated from the following:    Height as of 3/15/24: 1.544 m (5' 0.79\").    Weight as of 3/15/24: 39.6 kg (87 lb 4.8 oz).     Is patients BMI > 40 and scheduling location [facility]?  No    Do you take an injectable medication for weight loss or diabetes (excluding insulin)?  No    Do you take the medication Naltrexone?  No    Do you take blood thinners?  No "       Prep   Are you currently on dialysis or do you have chronic kidney disease?  No    Do you have a diagnosis of diabetes?  No    Do you have a diagnosis of cystic fibrosis (CF)?  No    On a regular basis do you go 3 -5 days between bowel movements?      BMI > 40?  No    Preferred Pharmacy:    [id number] - [place], [place] - [address]  [address] [facility] [address]  Phone: [phone] Fax: [phone]      Patient does not want to schedule until she is able to discuss further with care team.

## 2024-03-19 NOTE — PROGRESS NOTES
received Cancer Risk Management Program referral, referred for:    Testing for personal diagnoses of pancreas and breast cancer      Reviewed for appropriate plan, and sent to New Patient Scheduling for completion.

## 2024-03-19 NOTE — TELEPHONE ENCOUNTER
Called pt to discuss referral and Dr Das's recommendation for procedure     Procedure/Imaging/Clinic: Upper EUS with celiac neurolysis   Physician: Thiago   Timing: Next available   Scope time needed:15 minutes   Anesthesia:MAC   Dx: Metastatic pancreatic cancer. Pain   Tier:Tier 2 - Not life/limb threatening but potential for  patient morbidity/mortality or adverse  patient/disease outcome if  surgery/procedure not done within 30 day   Location: [facility]   Header of letter for pt communication:   Endoscopy with ultrasound, nerve injection for pain relief.     Pt in agreement with proceeding , questions answered about procedure to nursing abilities. Further questions deferred to Dr Das at time of procedure.     Explained they can expect a call from  for date of procedure, will need a , someone to stay with them for 24 hours and should stay in town for 24 hours (within 45 min of Hospital) post procedure    Does patient have any history of gastric bypass/gastric surgery/altered panc/bili anatomy? none    Any recent Covid symptoms or positive covid test? Discussed when to test at home.     Does patient have [employer] insurance?:Medicare    Med Review    Blood thinner -  none  ASA - none  Diabetic - none  Any meds by injection or mouth for weight loss or diabetes- none    Patient Education r/t procedure: mychart    A pre-op nurse will call 1-2 days prior to the procedure.    Verbalized understanding of all instructions. All questions answered.     Procedure order placed, message routed to OR .    Sherri Cueto, RN, BSN,   [facility]  Care coordinator

## 2024-03-20 NOTE — TELEPHONE ENCOUNTER
RECORDS STATUS - ALL OTHER DIAGNOSIS      RECORDS RECEIVED FROM: Epic   DATE RECEIVED:    NOTES STATUS DETAILS   OFFICE NOTE from referring provider     OFFICE NOTE from medical oncologist Epic 03/20/24: Dr. Lefty Matthew   OFFICE NOTE from other specialist [facility] Palliative care:  03/15/24: Dr. Hunter Schmidt   DISCHARGE SUMMARY from hospital [facility] 03/12/24: [facility]   MEDICATION LIST [facility]    LABS     PATHOLOGY REPORTS Reports in EPIC Flow Cytom:  03/12/24:[id number]    Surg Path:  03/12/24: [id number]   ANYTHING RELATED TO DIAGNOSIS Epic Most recent 03/19/24   IMAGING (NEED IMAGES & REPORT)     CT SCANS PACS 03/15/24: CT CAP  03/08/24: CTA Chest   PET PACS 03/15/24: PET Onc Whole Body

## 2024-03-20 NOTE — LETTER
3/20/2024         RE: Abigail Lam  [address] [place] [facility] [address]        Dear Colleague,    Thank you for referring your patient, Abigail Lam, to the [facility]. Please see a copy of my visit note below.    Virtual Visit Details    Type of service:  Video Visit   Video Start Time:  902  Video End Time:9:47 AM    Originating Location (pt. Location): Home    Distant Location (provider location):  Off-site  Platform used for Video Visit: Denise    DIAGNOSIS: Ms. Lam is 68 year old woman with a new diagnosis of metastatic pancreatic cancer.     She has a past  history of Stage I, node negative, breast cancer.  It was multifocal.  ER, NH positive, grade 1, negative for angiolymphatic invasion. One tumor was 2 cm, another was 0.5.  Had a low Oncotype score of 7.  She was treated with bilateral mastectomy, oophorectomy, started Tamoxifen in 2006 and took it for 5 years.  She has now been on extended endocrine therapy with Arimidex since June 2011.  She had been receiving q 6 month zolendronate but this was stopped Dec 2013 as it might have been causing her rib musculskeletal symptoms.  She switched to tamoxifen in June 2017. She stopped tamoxifen in December 2021.       INTERIM HISTORY:  Ms. Lam returns after a few days.  As noted in the record her workup is consistent with metastatic pancreatic cancer with metastasis to the liver.  She has seen Dr. Schmidt in palliative care and is scheduled to see Dr. De La Garza this week to begin a discussion about her chemotherapy.    Today we discussed the effect of this new diagnosis on her overall wellbeing.  We also discussed the management of pancreatic cancer and I told her that I am not a content expert and would ask her to make sure she connects with the specialist that she wants to see.  Today we discussed several broad topics.    1. Choice of chemotherapy regimen for first-line treatment of metastatic pancreatic cancer.  We  discussed the fact that the FOLFIRINOX regimen with its oxaliplatin was very active.  We also talked about gemcitabine and Abraxane as the other regimen used in the first-line setting.  She is concerned about both efficacy and quality of life.  In particular she has had lifelong problems with GI reflux and is very concerned about nausea and vomiting.  We discussed the emetogenic properties of both regimens in the antiemetic regimens we have to control this.  I told her this is a discussion she needs to have with Dr. De La Garza.    2.  Care provider.  She and her  have a top priority of having good communication with her care provider.  I have known her for over 15 years and we have had a good understanding of what is important to her.  I emphasized to her that she needs to see a specialist can I told her that Dr. De La Garza would be an excellent provider for her.  She also has been doing some talking to colleagues and Dr. Ryan Foy has come up as somebody she could see.  She knew the [facility] who was cared for by Dr. Leon and would like to see Dr. Leon as a second opinion.  She also mention Dr. Preet Rangel as somebody one of her colleagues has seen and would like an opinion from him as well.  I told her it is important to start treatment and I would not delay treatment while she is interviewing other providers.  Again I emphasized that Dr. De La Garza will provide her with the type of care she is interested in receiving again to be completed she wants to see other providers.  She also brought up the possibility that some of her care could take place in [place] but she is not willing to do that at the moment.    3.  Port placement.  She is not at this point in time willing to commit to a port.  When I saw her last week we had this discussion and the schedulers called her to make an appointment for port placement but this was something she was not prepared to do now.  We discussed the fact that a port would be  useful as some drugs are vesicant's and are difficult to deliver through a peripheral vein.  In addition some of the regimens could be weekly x 3 and it would be easier to have a port.  She will discuss this with Dr. De La Garza when she sees him.    4.  Outstanding tests.  We discussed the fact that her somatic sequencing is being performed by Sarah and that repeat of germline sequencing is also indicated.  These are currently in the works but neither would affect choice of first-line therapy.    5.  Pain control.  She is getting better pain control now on the regimen that Dr. Lissa GARCIA has started.  She is being evaluated for celiac axis plexus block and will have that done in the near future via endoscopy.    In summary at the end of our 45-minute discussion she had all of her concerns addressed.  I emphasized to her that I am not the content expert in pancreatic cancer but I would be more than willing to see her as other concerns have come up.    LABS:     Latest Reference Range & Units 03/19/24 10:31   Sodium 135 - 145 mmol/L 135   Potassium 3.4 - 5.3 mmol/L 3.7   Chloride 98 - 107 mmol/L 95 (L)   Carbon Dioxide (CO2) 22 - 29 mmol/L 29   Urea Nitrogen 8.0 - 23.0 mg/dL 8.5   Creatinine 0.51 - 0.95 mg/dL 0.56   GFR Estimate >60 mL/min/1.73m2 >90   Calcium 8.8 - 10.2 mg/dL 9.2   Anion Gap 7 - 15 mmol/L 11   Albumin 3.5 - 5.2 g/dL 4.2   Protein Total 6.4 - 8.3 g/dL 7.2   Alkaline Phosphatase 40 - 150 U/L 429 (H)   ALT 0 - 50 U/L 27   AST 0 - 45 U/L 57 (H)   Bilirubin Total <=1.2 mg/dL 0.8   Cancer Antigen 15-3 <=25 U/mL 104 (H)   CEA ng/mL 38.8   Glucose 70 - 99 mg/dL 160 (H)   Lactate Dehydrogenase 0 - 250 U/L 401 (H)   (L): Data is abnormally low  (H): Data is abnormally high  PROBLEMS:    Metastatic pancreatic cancer with pain in her back and chest. Liver, nodes, peritoneal space and sacrum involved.  History of ER positive breast cancer on extended therapy since about 02/2006. OncotypeDx score = 7  Textured breast  "implants - recent revision of the left breast implant  4.  Low bone density/Osteoporosis on outside scan   5. H/O GERD worse with emesis  6. Lower lip swelling - uncertain etiology - diagnosed as \"burning mouth syndrome\"  7. Diagnosis of enterococcus soft tissue infection of lip      IMPRESSION:   The patient is prepared now to have discussions around her therapy.  She will see Dr. De La Garza this week and has asked for additional consultation with members of our faculty.      PLAN:   Has appointment with Dr. De La Garza this week  Germline testing pending and awaiting genetic consultation  Tumor sent to Sarah, awaiting results  Will arrange for referrals to Gisela Foy and Claire Matthew MD    65 minutes today spent in consultation, record review, and documentation.  "

## 2024-03-20 NOTE — PROGRESS NOTES
Ludy Barnes, [facility], has offered via in-basket, to see patient in any open return spot of hers for genetic counseling.  I have updated instructions to reflect.    Humaira Samano, RN, BSN  Oncology New Patient Nurse Navigator   [facility]  [phone]

## 2024-03-20 NOTE — PROGRESS NOTES
Virtual Visit Details    Type of service:  Video Visit   Video Start Time:  902  Video End Time:9:47 AM    Originating Location (pt. Location): Home    Distant Location (provider location):  Off-site  Platform used for Video Visit: Denise    DIAGNOSIS: Ms. Lam is 68 year old woman with a new diagnosis of metastatic pancreatic cancer.     She has a past  history of Stage I, node negative, breast cancer.  It was multifocal.  ER, MS positive, grade 1, negative for angiolymphatic invasion. One tumor was 2 cm, another was 0.5.  Had a low Oncotype score of 7.  She was treated with bilateral mastectomy, oophorectomy, started Tamoxifen in 2006 and took it for 5 years.  She has now been on extended endocrine therapy with Arimidex since June 2011.  She had been receiving q 6 month zolendronate but this was stopped Dec 2013 as it might have been causing her rib musculskeletal symptoms.  She switched to tamoxifen in June 2017. She stopped tamoxifen in December 2021.       INTERIM HISTORY:  Ms. Lam returns after a few days.  As noted in the record her workup is consistent with metastatic pancreatic cancer with metastasis to the liver.  She has seen Dr. Schmidt in palliative care and is scheduled to see Dr. De La Garza this week to begin a discussion about her chemotherapy.    Today we discussed the effect of this new diagnosis on her overall wellbeing.  We also discussed the management of pancreatic cancer and I told her that I am not a content expert and would ask her to make sure she connects with the specialist that she wants to see.  Today we discussed several broad topics.    1. Choice of chemotherapy regimen for first-line treatment of metastatic pancreatic cancer.  We discussed the fact that the FOLFIRINOX regimen with its oxaliplatin was very active.  We also talked about gemcitabine and Abraxane as the other regimen used in the first-line setting.  She is concerned about both efficacy and quality of life.  In particular  she has had lifelong problems with GI reflux and is very concerned about nausea and vomiting.  We discussed the emetogenic properties of both regimens in the antiemetic regimens we have to control this.  I told her this is a discussion she needs to have with Dr. De La Garza.    2.  Care provider.  She and her  have a top priority of having good communication with her care provider.  I have known her for over 15 years and we have had a good understanding of what is important to her.  I emphasized to her that she needs to see a specialist can I told her that Dr. De La Garza would be an excellent provider for her.  She also has been doing some talking to colleagues and Dr. Ryan Foy has come up as somebody she could see.  She knew the rabbi who was cared for by Dr. Leon and would like to see Dr. Leon as a second opinion.  She also mention Dr. Preet Rangel as somebody one of her colleagues has seen and would like an opinion from him as well.  I told her it is important to start treatment and I would not delay treatment while she is interviewing other providers.  Again I emphasized that Dr. De La Garza will provide her with the type of care she is interested in receiving again to be completed she wants to see other providers.  She also brought up the possibility that some of her care could take place in [place] but she is not willing to do that at the moment.    3.  Port placement.  She is not at this point in time willing to commit to a port.  When I saw her last week we had this discussion and the schedulers called her to make an appointment for port placement but this was something she was not prepared to do now.  We discussed the fact that a port would be useful as some drugs are vesicant's and are difficult to deliver through a peripheral vein.  In addition some of the regimens could be weekly x 3 and it would be easier to have a port.  She will discuss this with Dr. De La Garza when she sees him.    4.   "Outstanding tests.  We discussed the fact that her somatic sequencing is being performed by Sarah and that repeat of germline sequencing is also indicated.  These are currently in the works but neither would affect choice of first-line therapy.    5.  Pain control.  She is getting better pain control now on the regimen that Dr. Lissa GARCIA has started.  She is being evaluated for celiac axis plexus block and will have that done in the near future via endoscopy.    In summary at the end of our 45-minute discussion she had all of her concerns addressed.  I emphasized to her that I am not the content expert in pancreatic cancer but I would be more than willing to see her as other concerns have come up.    LABS:     Latest Reference Range & Units 03/19/24 10:31   Sodium 135 - 145 mmol/L 135   Potassium 3.4 - 5.3 mmol/L 3.7   Chloride 98 - 107 mmol/L 95 (L)   Carbon Dioxide (CO2) 22 - 29 mmol/L 29   Urea Nitrogen 8.0 - 23.0 mg/dL 8.5   Creatinine 0.51 - 0.95 mg/dL 0.56   GFR Estimate >60 mL/min/1.73m2 >90   Calcium 8.8 - 10.2 mg/dL 9.2   Anion Gap 7 - 15 mmol/L 11   Albumin 3.5 - 5.2 g/dL 4.2   Protein Total 6.4 - 8.3 g/dL 7.2   Alkaline Phosphatase 40 - 150 U/L 429 (H)   ALT 0 - 50 U/L 27   AST 0 - 45 U/L 57 (H)   Bilirubin Total <=1.2 mg/dL 0.8   Cancer Antigen 15-3 <=25 U/mL 104 (H)   CEA ng/mL 38.8   Glucose 70 - 99 mg/dL 160 (H)   Lactate Dehydrogenase 0 - 250 U/L 401 (H)   (L): Data is abnormally low  (H): Data is abnormally high  PROBLEMS:    Metastatic pancreatic cancer with pain in her back and chest. Liver, nodes, peritoneal space and sacrum involved.  History of ER positive breast cancer on extended therapy since about 02/2006. OncotypeDx score = 7  Textured breast implants - recent revision of the left breast implant  4.  Low bone density/Osteoporosis on outside scan   5. H/O GERD worse with emesis  6. Lower lip swelling - uncertain etiology - diagnosed as \"burning mouth syndrome\"  7. Diagnosis of enterococcus " soft tissue infection of lip      IMPRESSION:   The patient is prepared now to have discussions around her therapy.  She will see Dr. De La Garza this week and has asked for additional consultation with members of our faculty.      PLAN:   Has appointment with Dr. De La Garza this week  Germline testing pending and awaiting genetic consultation  Tumor sent to Sarah, awaiting results  Will arrange for referrals to Gisela Foy and Claire Matthew MD    65 minutes today spent in consultation, record review, and documentation.

## 2024-03-22 PROBLEM — C25.1 MALIGNANT NEOPLASM OF BODY OF PANCREAS (H): Status: ACTIVE | Noted: 2024-01-01

## 2024-03-22 NOTE — NURSING NOTE
"Oncology Rooming Note    March 22, 2024 1:03 PM   Abigail Lam is a 68 year old female who presents for:    Chief Complaint   Patient presents with    Oncology Clinic Visit     [facility] NEW - PANCREATIC CANCER     Initial Vitals: BP (!) 147/75 (BP Location: Right arm, Patient Position: Chair, Cuff Size: Adult Small)   Pulse 98   Temp 97.1  F (36.2  C) (Tympanic)   Resp 16   Ht 1.544 m (5' 0.79\")   Wt 38.8 kg (85 lb 9.6 oz)   SpO2 95%   BMI 16.29 kg/m   Estimated body mass index is 16.29 kg/m  as calculated from the following:    Height as of this encounter: 1.544 m (5' 0.79\").    Weight as of this encounter: 38.8 kg (85 lb 9.6 oz). Body surface area is 1.29 meters squared.  Moderate Pain (5) Comment: Data Unavailable   No LMP recorded. Patient is postmenopausal.  Allergies reviewed: Yes  Medications reviewed: Yes    Medications: Medication refills not needed today.  Pharmacy name entered into [employer]:    [facility] [place], [place] - [address] [facility] [id number] [place], [place] - [address]  [id number] - CLOSED [place], [place] - [address] [facility] [id number] [place], [place] - [address]    Frailty Screening:   Is the patient here for a new oncology consult visit in cancer care? 1. Yes. Over the past month, have you experienced difficulty or required a caregiver to assist with:   1. Balance, walking or general mobility (including any falls)? NO  2. Completion of self-care tasks such as bathing, dressing, toileting, grooming/hygiene?  NO  3. Concentration or memory that affects your daily life?  NO     Adonay Galvan LPN              "

## 2024-03-22 NOTE — PROGRESS NOTES
[facility] - Medical Oncology New Outpatient Consult Note  3/22/2024    Patient Identifiers     Name: Abigail Lam  Preferred Address: [place]  Preferred Language: English  : 1955  Gender: female    Assessment and Plan     Ms. Abigail Lam is a 68 year old female with a history of stage I ER/IA+ R breast IDC s/p mastectomy (2005) and adjuvant tamoxifen/arimidex/tamoxifen ([id number]) who presents to clinic for newly diagnosed biopsy-proven metastatic pancreas cancer.    NGS: pending internal  Immuno: pending [facility] LUCIA testing  Clinical Trial: CAROLYN-186, CAROLYN-280, TORL    Ms. Lam presents to clinic for initial consultation of biopsy-proven metastatic pancreas cancer.  Consultation visit is made somewhat unique as patient has declined treatment initiation and counseling and wishes to meet with both me and my partners to ultimately determine her oncologic care.  We wish to provide support for her and what ever way we can, but emphasized the importance of symptom palliation and palliative anticancer treatment initiation to that end.  Patient is already met with palliative care, who recommended a nerve block for peripancreatic pain.  Of primary concern to her is quality of life and symptom management.    Patient notes that she has had significant issues with nausea and emesis throughout her life, including describing hyperemesis gravidarum during prior pregnancies.  Given the side effect of nausea and emesis associated with cancer chemotherapy, she wishes to strongly consider the risks and benefits before proceeding.  We described that her breast oncologist, Dr. Matthew, has already sent out some initial genetic sequencing.  We recommend the addition of genetic testing through [facility] to this limited sequencing, as it may add additional targeted treatment options for clinical trials.  She was agreeable to this additional testing.    We have had extended discussions with our oncologic partners, along with her  breast oncologist, and palliative care team.  These discussions are primarily directed at symptom palliation which, as outlined above, remains patient's top goal.  As patient does not wish to determine a treating oncologist at this time, we will defer further scheduling with me until/unless she contacts our clinic and requests.  We are available to the patient and our GI oncology partners for any further discussion, recommendations, or counseling as needed.    90 minutes spent on the date of the encounter doing chart review, review of test results, interpretation of tests, patient visit, documentation, discussion with other provider(s), and discussion with family     The longitudinal plan of care for the diagnosis(es)/condition(s) as documented were addressed during this visit. Due to the added complexity in care, I will continue to support Abigail in the subsequent management and with ongoing continuity of care.    Jonathan De La Garza MD, PhD   of Medicine  Division of Hematology, Oncology and Transplantation  [facility]    -----------------------------------    Oncology Summary      Cancer Staging   Malignant neoplasm of body of pancreas (H)  Staging form: Pancreas, AJCC 8th Edition  - Clinical stage from 3/12/2024: Stage IV (cTX, cNX, pM1) - Signed by Jonathan De La Garza MD on 3/25/2024    Malignant neoplasm of overlapping sites of right breast in female, estrogen receptor positive (H)  Staging form: Breast, AJCC 8th Edition  - Clinical stage from 10/14/2005: Stage IA (cT1c, cN0(sn), cM0, G1, ER+, WY+, HER2-) - Signed by Jonathan De La Garza MD on 3/25/2024      Oncology History Overview Note   Breast:  She has a past  history of Stage I, node negative, breast cancer.  It was multifocal.  ER, WY positive, grade 1, negative for angiolymphatic invasion. One tumor was 2 cm, another was 0.5.  Had a low Oncotype score of 7.  She was treated with bilateral mastectomy, oophorectomy, started Tamoxifen in  2006 and took it for 5 years.  She has now been on extended endocrine therapy with Arimidex since June 2011.  She had been receiving q 6 month zolendronate but this was stopped Dec 2013 as it might have been causing her rib musculskeletal symptoms.  She switched to tamoxifen in June 2017. She stopped tamoxifen in December 2021.     Pancreas:   Initial complaint on return trip from [place] was back pain, followed by chest pain. Presented to [facility] in  where she underwent cardiac workup. This workup was negative, but she continued to experiencing migrating pain from her chest, to her ribs, and back. Following her trip, she presented to Dr. Matthew's office who performed imaging workup for her complaints and found pulmonary and hepatic lesions concerning for metastatic disease. Biopsy of liver lesion demonstrated mucin-producing adenocarcinoma consistent with pancreas primary, prompting referral to [facility] GI Oncology and treatment counseling.     Malignant neoplasm of body of pancreas (H)   3/12/2024 -  Cancer Staged    Staging form: Pancreas, AJCC 8th Edition  - Clinical stage from 3/12/2024: Stage IV (cTX, cNX, pM1)     3/22/2024 Initial Diagnosis    Malignant neoplasm of body of pancreas (H)     Malignant neoplasm of overlapping sites of right breast in female, estrogen receptor positive (H)   10/14/2005 -  Cancer Staged    Staging form: Breast, AJCC 8th Edition  - Clinical stage from 10/14/2005: Stage IA (cT1c, cN0(sn), cM0, G1, ER+, [place]+, HER2-)     3/25/2024 Initial Diagnosis    Malignant neoplasm of overlapping sites of right breast in female, estrogen receptor positive (H)         Subjective/Interval Events     - has a history of terrible morning sickness. Had hyperemesis during the pregnancy requiring IV fluids at the home.  - has not taken much PO intake over the past two weeks  - continues to have terrible reflux. Is managed across multiple medical centers for the reflux.    - takes oxycodone around 11AM and then  "again around 6PM. Takes zofran with the oxycodone.     - takes ativan intermittently for panic attacks, 2-3x daily. Taking klonopin     - Initial complaint on return trip from [place] was back pain, followed by chest pain. Presented to [facility] in  where she underwent cardiac workup. This workup was negative, but she continued to experiencing migrating pain from her chest, to her ribs, and back. Following her trip, she presented to Dr. Matthew's office who performed imaging workup for her complaints and found pulmonary and hepatic lesions concerning for metastatic disease. Biopsy of liver lesion demonstrated mucin-producing adenocarcinoma consistent with pancreas primary, prompting referral to [facility] GI Oncology and treatment counseling.     Physical Exam     Vital signs: BP (!) 147/75 (BP Location: Right arm, Patient Position: Chair, Cuff Size: Adult Small)   Pulse 98   Temp 97.1  F (36.2  C) (Tympanic)   Resp 16   Ht 1.544 m (5' 0.79\")   Wt 38.8 kg (85 lb 9.6 oz)   SpO2 95%   BMI 16.29 kg/m      ECOG performance status:  1  Vascular access:  none    Physical Exam:  Exam performed, notable for:  - small, thin woman, sitting in chair in obvious distress  - breathing comfortably on RA    Objective Data     Lab data:  I have personally reviewed the lab data, notable for:    - Wbc 16.4  - Hgb 13.0  - Plt 346  -  [id number]    Radiology data:  I have personally reviewed the radiology data, notable for:  03/15/2024 PET Oncology  IMPRESSION:  Findings suspicious for primary pancreatic neoplasm with metastatic disease in the liver, upper abdominal lymph nodes, peritoneum, and a single site of the skeleton.    Pathology and other data:  I have personally reviewed and interpreted the pathology data, notable for:    03/12/2024 Surg Path  Final Diagnosis   LIVER, NEEDLE BIOPSY OF A MASS LESION:  Adenocarcinoma with focal mucin production, moderately differentiated  (See Comment)      Electronically signed by Jeremias Faustin MD " on 3/14/2024 at  9:57 AM     Comment  [id number]   The target lesion is adequately represented and it shows an infiltrating adenocarcinoma with foci of luminal mucin production that can be seen with breast but rarely. Also, the lesion does not stain for GATA3, but reacts positively, albeit focally for CDX2. The TTF1 is mostly negative but few cells with weak and occasionally strong staining are also seen. The tumor cells  react strongly/diffusely for CK7, while CK20 is largely negative but shows the rare positive tumor cells. Portions of the underlying non-tumor parenchyma are not represented in the current sampling.     The lesion is an adenocarcinoma, and while metastasis from the breast cannot be absolutely ruled out, the routine and immunohistochemistry features do not appear to support this. Therefore, clinical screening and/or PET scan to rule out origin from preferably the pancreas, upper GI tract and hepatobiliary sites, but also the lung, is recommended, as this profile appears to point in these directions.     Medical/Surgical History     Past medical history:  Active Ambulatory Problems     Diagnosis Date Noted    Personal history of malignant neoplasm of breast 12/16/2011    Acute post-operative pain 08/14/2018    Malignant neoplasm of body of pancreas (H) 03/22/2024    Shortness of breath 03/24/2024    Dehydration 03/24/2024    Weakness 03/24/2024    Abdominal pain, unspecified abdominal location 03/24/2024    Malignant neoplasm of pancreas, unspecified location of malignancy (H) 03/24/2024    Chest pain, unspecified type 03/24/2024     Resolved Ambulatory Problems     Diagnosis Date Noted    No Resolved Ambulatory Problems     No Additional Past Medical History       Past surgical history:  Past Surgical History:   Procedure Laterality Date    IR LIVER BIOPSY PERCUTANEOUS  3/12/2024        Social history:   Social History     Tobacco Use    Smoking status: Never     Passive exposure: Never    Smokeless  tobacco: Never   Substance Use Topics    Alcohol use: Not Currently       Family history:  Family History   Problem Relation Age of Onset    Macular Degeneration Mother     Glaucoma No family hx of        Allergies:   Allergies   Allergen Reactions    Bee Venom      Other reaction(s): Edema  Swelling    Diphtheria Toxoid-Tetanus Tox-Thimerosal      Other reaction(s): Edema    Ketorolac      Other reaction(s): Hypertension    Ketorolac Tromethamine     Ketorolac Tromethamine      Other reaction(s): Other (See Comments)  Creates high blood pressure    Nickel Other (See Comments)     Per patch test      Nitrofurantoin      Other reaction(s): Edema  Lower lip swelling.    Propolis      Other reaction(s): Edema  Swelling    Scopolamine Hives       Outpatient medications:     Current Facility-Administered Medications:     lactated ringers BOLUS 1,000 mL, 1,000 mL, Intravenous, Once, Jonathan De La Garza MD    ondansetron (ZOFRAN) injection 4 mg, 4 mg, Intravenous, Once, Jonathan De La Garza MD  No current outpatient medications on file.    Facility-Administered Medications Ordered in Other Visits:     acetaminophen (TYLENOL) tablet 1,000 mg, 1,000 mg, Oral, Q6H PRN, Ernst Tam MD, 1,000 mg at 03/25/24 0040    atorvastatin (LIPITOR) tablet 10 mg, 10 mg, Oral, Every Other Day, Ernst Tam MD    buprenorphine (BUTRANS) 5 MCG/HR WK patch 1 patch, 1 patch, Transdermal, Weekly, 1 patch at 03/25/24 0817 **AND** buprenorphine (BUTRANS) Patch in Place, , Transdermal, Q8H JOSE GUADALUPE, Ernst Tam MD    calcium carbonate (TUMS) chewable tablet 1,000 mg, 1,000 mg, Oral, 4x Daily PRN, Ernst Tam MD    clonazePAM (klonoPIN) tablet 0.5 mg, 0.5 mg, Oral, At Bedtime PRN, Ernst Tam MD, 0.5 mg at 03/25/24 0014    dexAMETHasone (DECADRON) tablet 4 mg, 4 mg, Oral, Daily with breakfast, Ernst Tam MD, 4 mg at 03/25/24 1112    docusate sodium (COLACE) capsule 100 mg, 100 mg, Oral, Daily, Anel,  Ernst Sosa MD, 100 mg at 03/25/24 1050    famotidine (PEPCID) tablet 40 mg, 40 mg, Oral, Daily, Ernst Tam MD, 40 mg at 03/25/24 1052    [Held by provider] fluticasone (FLONASE) 50 MCG/ACT spray 1 spray, 1 spray, Both Nostrils, Daily, Ernst Tam MD    heparin infusion 25,000 units in D5W 250 mL ANTICOAGULANT, 0-5,000 Units/hr, Intravenous, Continuous, Gustabo Arzate MD, Last Rate: 7 mL/hr at 03/25/24 0028, 700 Units/hr at 03/25/24 0028    lactated ringers infusion, , Intravenous, Continuous, Ernst Tam MD, Stopped at 03/25/24 0020    Lidocaine (LIDOCARE) 4 % Patch 1 patch, 1 patch, Transdermal, Q24H, Ernst Tam MD, 1 patch at 03/24/24 2246    lidocaine (LMX4) cream, , Topical, Q1H PRN, Ernst Tam MD    lidocaine 1 % 0.1-1 mL, 0.1-1 mL, Other, Q1H PRN, Ernst Tam MD    methocarbamol (ROBAXIN) tablet 500 mg, 500 mg, Oral, TID, Ernst Tam MD    naloxone (NARCAN) injection 0.2 mg, 0.2 mg, Intravenous, Q2 Min PRN **OR** naloxone (NARCAN) injection 0.4 mg, 0.4 mg, Intravenous, Q2 Min PRN **OR** naloxone (NARCAN) injection 0.2 mg, 0.2 mg, Intramuscular, Q2 Min PRN **OR** naloxone (NARCAN) injection 0.4 mg, 0.4 mg, Intramuscular, Q2 Min PRN, Shubham Tomas MD    omeprazole (PriLOSEC) CR capsule 20 mg, 20 mg, Oral, QAM AC, Belauram, [place], DO    ondansetron (ZOFRAN ODT) ODT tab 4 mg, 4 mg, Oral, Q6H PRN, 4 mg at 03/25/24 0807 **OR** ondansetron (ZOFRAN) injection 4 mg, 4 mg, Intravenous, Q6H PRN, Ernst Tam MD    oxyCODONE (ROXICODONE) tablet 5 mg, 5 mg, Oral, Q3H PRN, Ernst Tam MD, 5 mg at 03/25/24 0937    Patient is already receiving anticoagulation with heparin, enoxaparin (LOVENOX), warfarin (COUMADIN)  or other anticoagulant medication, , Does not apply, Continuous PRN, Ernst Tam MD    polyethylene glycol (MIRALAX) Packet 17 g, 17 g, Oral, Daily, Ernst Tam MD, 17 g at 03/25/24 1050     prochlorperazine (COMPAZINE) injection 5 mg, 5 mg, Intravenous, Q6H PRN **OR** prochlorperazine (COMPAZINE) tablet 5 mg, 5 mg, Oral, Q6H PRN **OR** prochlorperazine (COMPAZINE) suppository 12.5 mg, 12.5 mg, Rectal, Q12H PRN, Ernst Tam MD    senna-docusate (SENOKOT-S/PERICOLACE) 8.6-50 MG per tablet 1 tablet, 1 tablet, Oral, BID PRN **OR** senna-docusate (SENOKOT-S/PERICOLACE) 8.6-50 MG per tablet 2 tablet, 2 tablet, Oral, BID PRN, Ernst Tam MD    sodium chloride (PF) 0.9% PF flush 3 mL, 3 mL, Intracatheter, Q8H, Ernst Tam MD, 3 mL at 03/24/24 2246    sodium chloride (PF) 0.9% PF flush 3 mL, 3 mL, Intracatheter, q1 min prn, Ernst Tam MD    sodium phosphate (FLEET ENEMA) 1 enema, 1 enema, Rectal, Once, Jose Juárez MD    Vitamin D3 (CHOLECALCIFEROL) tablet 50 mcg, 50 mcg, Oral, Daily, Ernst Tam MD

## 2024-03-22 NOTE — LETTER
3/22/2024         RE: Abigail Lam  [address] [facility] [address]        Dear Colleague,    Thank you for referring your patient, Abigail Lam, to the [facility]. Please see a copy of my visit note below.    [facility] - Medical Oncology New Outpatient Consult Note  3/22/2024    Patient Identifiers     Name: Abigail Lam  Preferred Address: [place]  Preferred Language: English  : 1955  Gender: female    Assessment and Plan     Ms. Abigail Lam is a 68 year old female with a history of stage I ER/FL+ R breast IDC s/p mastectomy (2005) and adjuvant tamoxifen/arimidex/tamoxifen ([id number]) who presents to clinic for newly diagnosed biopsy-proven metastatic pancreas cancer.    NGS: pending internal  Immuno: pending [facility] LUCIA testing  Clinical Trial: CAROLYN-186, CAROLYN-280, TORL    Ms. Lam presents to clinic for initial consultation of biopsy-proven metastatic pancreas cancer.  Consultation visit is made somewhat unique as patient has declined treatment initiation and counseling and wishes to meet with both me and my partners to ultimately determine her oncologic care.  We wish to provide support for her and what ever way we can, but emphasized the importance of symptom palliation and palliative anticancer treatment initiation to that end.  Patient is already met with palliative care, who recommended a nerve block for peripancreatic pain.  Of primary concern to her is quality of life and symptom management.    Patient notes that she has had significant issues with nausea and emesis throughout her life, including describing hyperemesis gravidarum during prior pregnancies.  Given the side effect of nausea and emesis associated with cancer chemotherapy, she wishes to strongly consider the risks and benefits before proceeding.  We described that her breast oncologist, Dr. Matthew, has already sent out some initial genetic sequencing.  We recommend the addition of  genetic testing through [facility] to this limited sequencing, as it may add additional targeted treatment options for clinical trials.  She was agreeable to this additional testing.    We have had extended discussions with our oncologic partners, along with her breast oncologist, and palliative care team.  These discussions are primarily directed at symptom palliation which, as outlined above, remains patient's top goal.  As patient does not wish to determine a treating oncologist at this time, we will defer further scheduling with me until/unless she contacts our clinic and requests.  We are available to the patient and our GI oncology partners for any further discussion, recommendations, or counseling as needed.    90 minutes spent on the date of the encounter doing chart review, review of test results, interpretation of tests, patient visit, documentation, discussion with other provider(s), and discussion with family     The longitudinal plan of care for the diagnosis(es)/condition(s) as documented were addressed during this visit. Due to the added complexity in care, I will continue to support Abigail in the subsequent management and with ongoing continuity of care.    Jonathan De La Garza MD, PhD   of Medicine  Division of Hematology, Oncology and Transplantation  [facility]    -----------------------------------    Oncology Summary      Cancer Staging   Malignant neoplasm of body of pancreas (H)  Staging form: Pancreas, AJCC 8th Edition  - Clinical stage from 3/12/2024: Stage IV (cTX, cNX, pM1) - Signed by Jonathan De La Garza MD on 3/25/2024    Malignant neoplasm of overlapping sites of right breast in female, estrogen receptor positive (H)  Staging form: Breast, AJCC 8th Edition  - Clinical stage from 10/14/2005: Stage IA (cT1c, cN0(sn), cM0, G1, ER+, MO+, HER2-) - Signed by Jonathan De La Garza MD on 3/25/2024      Oncology History Overview Note   Breast:  She has a past  history of Stage I, node  negative, breast cancer.  It was multifocal.  ER, NV positive, grade 1, negative for angiolymphatic invasion. One tumor was 2 cm, another was 0.5.  Had a low Oncotype score of 7.  She was treated with bilateral mastectomy, oophorectomy, started Tamoxifen in 2006 and took it for 5 years.  She has now been on extended endocrine therapy with Arimidex since June 2011.  She had been receiving q 6 month zolendronate but this was stopped Dec 2013 as it might have been causing her rib musculskeletal symptoms.  She switched to tamoxifen in June 2017. She stopped tamoxifen in December 2021.     Pancreas:   Initial complaint on return trip from [place] was back pain, followed by chest pain. Presented to [facility] in  where she underwent cardiac workup. This workup was negative, but she continued to experiencing migrating pain from her chest, to her ribs, and back. Following her trip, she presented to Dr. Matthew's office who performed imaging workup for her complaints and found pulmonary and hepatic lesions concerning for metastatic disease. Biopsy of liver lesion demonstrated mucin-producing adenocarcinoma consistent with pancreas primary, prompting referral to [facility] GI Oncology and treatment counseling.     Malignant neoplasm of body of pancreas (H)   3/12/2024 -  Cancer Staged    Staging form: Pancreas, AJCC 8th Edition  - Clinical stage from 3/12/2024: Stage IV (cTX, cNX, pM1)     3/22/2024 Initial Diagnosis    Malignant neoplasm of body of pancreas (H)     Malignant neoplasm of overlapping sites of right breast in female, estrogen receptor positive (H)   10/14/2005 -  Cancer Staged    Staging form: Breast, AJCC 8th Edition  - Clinical stage from 10/14/2005: Stage IA (cT1c, cN0(sn), cM0, G1, ER+, NV+, HER2-)     3/25/2024 Initial Diagnosis    Malignant neoplasm of overlapping sites of right breast in female, estrogen receptor positive (H)         Subjective/Interval Events     - has a history of terrible morning sickness. Had  "hyperemesis during the pregnancy requiring IV fluids at the home.  - has not taken much PO intake over the past two weeks  - continues to have terrible reflux. Is managed across multiple medical centers for the reflux.    - takes oxycodone around 11AM and then again around 6PM. Takes zofran with the oxycodone.     - takes ativan intermittently for panic attacks, 2-3x daily. Taking klonopin     - Initial complaint on return trip from [place] was back pain, followed by chest pain. Presented to [facility] in  where she underwent cardiac workup. This workup was negative, but she continued to experiencing migrating pain from her chest, to her ribs, and back. Following her trip, she presented to Dr. Matthew's office who performed imaging workup for her complaints and found pulmonary and hepatic lesions concerning for metastatic disease. Biopsy of liver lesion demonstrated mucin-producing adenocarcinoma consistent with pancreas primary, prompting referral to [facility] GI Oncology and treatment counseling.     Physical Exam     Vital signs: BP (!) 147/75 (BP Location: Right arm, Patient Position: Chair, Cuff Size: Adult Small)   Pulse 98   Temp 97.1  F (36.2  C) (Tympanic)   Resp 16   Ht 1.544 m (5' 0.79\")   Wt 38.8 kg (85 lb 9.6 oz)   SpO2 95%   BMI 16.29 kg/m      ECOG performance status:  1  Vascular access:  none    Physical Exam:  Exam performed, notable for:  - small, thin woman, sitting in chair in obvious distress  - breathing comfortably on RA    Objective Data     Lab data:  I have personally reviewed the lab data, notable for:    - Wbc 16.4  - Hgb 13.0  - Plt 346  -  [id number]    Radiology data:  I have personally reviewed the radiology data, notable for:  03/15/2024 PET Oncology  IMPRESSION:  Findings suspicious for primary pancreatic neoplasm with metastatic disease in the liver, upper abdominal lymph nodes, peritoneum, and a single site of the skeleton.    Pathology and other data:  I have personally reviewed and " interpreted the pathology data, notable for:    03/12/2024 Surg Path  Final Diagnosis   LIVER, NEEDLE BIOPSY OF A MASS LESION:  Adenocarcinoma with focal mucin production, moderately differentiated  (See Comment)      Electronically signed by Jeremias Faustin MD on 3/14/2024 at  9:57 AM     Comment  UUMABIANCA   The target lesion is adequately represented and it shows an infiltrating adenocarcinoma with foci of luminal mucin production that can be seen with breast but rarely. Also, the lesion does not stain for GATA3, but reacts positively, albeit focally for CDX2. The TTF1 is mostly negative but few cells with weak and occasionally strong staining are also seen. The tumor cells  react strongly/diffusely for CK7, while CK20 is largely negative but shows the rare positive tumor cells. Portions of the underlying non-tumor parenchyma are not represented in the current sampling.     The lesion is an adenocarcinoma, and while metastasis from the breast cannot be absolutely ruled out, the routine and immunohistochemistry features do not appear to support this. Therefore, clinical screening and/or PET scan to rule out origin from preferably the pancreas, upper GI tract and hepatobiliary sites, but also the lung, is recommended, as this profile appears to point in these directions.     Medical/Surgical History     Past medical history:  Active Ambulatory Problems     Diagnosis Date Noted    Personal history of malignant neoplasm of breast 12/16/2011    Acute post-operative pain 08/14/2018    Malignant neoplasm of body of pancreas (H) 03/22/2024    Shortness of breath 03/24/2024    Dehydration 03/24/2024    Weakness 03/24/2024    Abdominal pain, unspecified abdominal location 03/24/2024    Malignant neoplasm of pancreas, unspecified location of malignancy (H) 03/24/2024    Chest pain, unspecified type 03/24/2024     Resolved Ambulatory Problems     Diagnosis Date Noted    No Resolved Ambulatory Problems     No Additional Past  Medical History       Past surgical history:  Past Surgical History:   Procedure Laterality Date    IR LIVER BIOPSY PERCUTANEOUS  3/12/2024        Social history:   Social History     Tobacco Use    Smoking status: Never     Passive exposure: Never    Smokeless tobacco: Never   Substance Use Topics    Alcohol use: Not Currently       Family history:  Family History   Problem Relation Age of Onset    Macular Degeneration Mother     Glaucoma No family hx of        Allergies:   Allergies   Allergen Reactions    Bee Venom      Other reaction(s): Edema  Swelling    Diphtheria Toxoid-Tetanus Tox-Thimerosal      Other reaction(s): Edema    Ketorolac      Other reaction(s): Hypertension    Ketorolac Tromethamine     Ketorolac Tromethamine      Other reaction(s): Other (See Comments)  Creates high blood pressure    Nickel Other (See Comments)     Per patch test      Nitrofurantoin      Other reaction(s): Edema  Lower lip swelling.    Propolis      Other reaction(s): Edema  Swelling    Scopolamine Hives       Outpatient medications:     Current Facility-Administered Medications:     lactated ringers BOLUS 1,000 mL, 1,000 mL, Intravenous, Once, Jonathan De La Garza MD    ondansetron (ZOFRAN) injection 4 mg, 4 mg, Intravenous, Once, Jonathan De La Garza MD  No current outpatient medications on file.    Facility-Administered Medications Ordered in Other Visits:     acetaminophen (TYLENOL) tablet 1,000 mg, 1,000 mg, Oral, Q6H PRN, Ernst Tam MD, 1,000 mg at 03/25/24 0040    atorvastatin (LIPITOR) tablet 10 mg, 10 mg, Oral, Every Other Day, Ernst Tam MD    buprenorphine (BUTRANS) 5 MCG/HR WK patch 1 patch, 1 patch, Transdermal, Weekly, 1 patch at 03/25/24 0817 **AND** buprenorphine (BUTRANS) Patch in Place, , Transdermal, Q8H JOSE GUADALUPE, Ernst Tam MD    calcium carbonate (TUMS) chewable tablet 1,000 mg, 1,000 mg, Oral, 4x Daily PRN, Ernst Tam MD    clonazePAM (klonoPIN) tablet 0.5 mg, 0.5 mg, Oral, At  Bedtime PRN, Ernst Tam MD, 0.5 mg at 03/25/24 0014    dexAMETHasone (DECADRON) tablet 4 mg, 4 mg, Oral, Daily with breakfast, Ernst Tam MD, 4 mg at 03/25/24 1112    docusate sodium (COLACE) capsule 100 mg, 100 mg, Oral, Daily, Ernst Tam MD, 100 mg at 03/25/24 1050    famotidine (PEPCID) tablet 40 mg, 40 mg, Oral, Daily, Ernst Tam MD, 40 mg at 03/25/24 1052    [Held by provider] fluticasone (FLONASE) 50 MCG/ACT spray 1 spray, 1 spray, Both Nostrils, Daily, Ernst Tam MD    heparin infusion 25,000 units in D5W 250 mL ANTICOAGULANT, 0-5,000 Units/hr, Intravenous, Continuous, Gustabo Arzate MD, Last Rate: 7 mL/hr at 03/25/24 0028, 700 Units/hr at 03/25/24 0028    lactated ringers infusion, , Intravenous, Continuous, Ernst Tam MD, Stopped at 03/25/24 0020    Lidocaine (LIDOCARE) 4 % Patch 1 patch, 1 patch, Transdermal, Q24H, Ernst Tam MD, 1 patch at 03/24/24 2246    lidocaine (LMX4) cream, , Topical, Q1H PRN, Ernst Tam MD    lidocaine 1 % 0.1-1 mL, 0.1-1 mL, Other, Q1H PRN, Ernst Tam MD    methocarbamol (ROBAXIN) tablet 500 mg, 500 mg, Oral, TID, Ernst Tam MD    naloxone (NARCAN) injection 0.2 mg, 0.2 mg, Intravenous, Q2 Min PRN **OR** naloxone (NARCAN) injection 0.4 mg, 0.4 mg, Intravenous, Q2 Min PRN **OR** naloxone (NARCAN) injection 0.2 mg, 0.2 mg, Intramuscular, Q2 Min PRN **OR** naloxone (NARCAN) injection 0.4 mg, 0.4 mg, Intramuscular, Q2 Min PRN, Shubham Tomas MD    omeprazole (PriLOSEC) CR capsule 20 mg, 20 mg, Oral, QAM HANANE, Easton Espinoza,     ondansetron (ZOFRAN ODT) ODT tab 4 mg, 4 mg, Oral, Q6H PRN, 4 mg at 03/25/24 0807 **OR** ondansetron (ZOFRAN) injection 4 mg, 4 mg, Intravenous, Q6H PRN, Ernst Tam MD    oxyCODONE (ROXICODONE) tablet 5 mg, 5 mg, Oral, Q3H PRN, Ernst Tam MD, 5 mg at 03/25/24 0937    Patient is already receiving anticoagulation with  heparin, enoxaparin (LOVENOX), warfarin (COUMADIN)  or other anticoagulant medication, , Does not apply, Continuous PRN, Ernst Tam MD    polyethylene glycol (MIRALAX) Packet 17 g, 17 g, Oral, Daily, Ernst Tam MD, 17 g at 03/25/24 1050    prochlorperazine (COMPAZINE) injection 5 mg, 5 mg, Intravenous, Q6H PRN **OR** prochlorperazine (COMPAZINE) tablet 5 mg, 5 mg, Oral, Q6H PRN **OR** prochlorperazine (COMPAZINE) suppository 12.5 mg, 12.5 mg, Rectal, Q12H PRN, Ernst Tam MD    senna-docusate (SENOKOT-S/PERICOLACE) 8.6-50 MG per tablet 1 tablet, 1 tablet, Oral, BID PRN **OR** senna-docusate (SENOKOT-S/PERICOLACE) 8.6-50 MG per tablet 2 tablet, 2 tablet, Oral, BID PRN, Ernst Tam MD    sodium chloride (PF) 0.9% PF flush 3 mL, 3 mL, Intracatheter, Q8H, Ernts Tam MD, 3 mL at 03/24/24 2246    sodium chloride (PF) 0.9% PF flush 3 mL, 3 mL, Intracatheter, q1 min prn, Ernst Tam MD    sodium phosphate (FLEET ENEMA) 1 enema, 1 enema, Rectal, Once, Jose Juárez MD    Vitamin D3 (CHOLECALCIFEROL) tablet 50 mcg, 50 mcg, Oral, Daily, Ernst Tam MD Ajay Prakash, MD

## 2024-03-22 NOTE — TELEPHONE ENCOUNTER
[facility]: Cancer Care                                                                                          Pt requested to meet with writer and relayed that she has not been able to eat or drink for days. Stated she feels both dehydrated and weak and would like to know if she could get IVF or IV nutrition started. Informed her IV nutrition would require a visit with the Dietician, a central or peripheral line placed by IR and education by [facility] for pt and spouse to manage themselves. Ongoing IVF orders would need to be ordered by managing Oncologist.    Dr. De La Garza agreeable to a one time IVF order in clinic with antiemetic. Offered to do this today since clinic RN had availability. Pt then declined stating she was due for her pain medication and did not feel she could wait another hour in clinic for infusion. She asked if she could either come back after she'd taken her pain medication or in the morning. Informed her there was no availability this weekend in the infusion center, if pt does feel dehydration, weakness, nausea are unmanageable at home, advise she come to the ER for assessment. She verbalized understanding and left the clinic ambulating on her own without assistance.    Signature:  Lina Manning RN

## 2024-03-24 PROBLEM — R06.02 SHORTNESS OF BREATH: Status: ACTIVE | Noted: 2024-01-01

## 2024-03-24 PROBLEM — R07.9 CHEST PAIN, UNSPECIFIED TYPE: Status: ACTIVE | Noted: 2024-01-01

## 2024-03-24 PROBLEM — E86.0 DEHYDRATION: Status: ACTIVE | Noted: 2024-01-01

## 2024-03-24 PROBLEM — C25.9 MALIGNANT NEOPLASM OF PANCREAS, UNSPECIFIED LOCATION OF MALIGNANCY (H): Status: ACTIVE | Noted: 2024-01-01

## 2024-03-24 PROBLEM — R10.9 ABDOMINAL PAIN, UNSPECIFIED ABDOMINAL LOCATION: Status: ACTIVE | Noted: 2024-01-01

## 2024-03-24 PROBLEM — R53.1 WEAKNESS: Status: ACTIVE | Noted: 2024-01-01

## 2024-03-24 NOTE — ED TRIAGE NOTES
Pt ambulatory to triage with c/o dehydration. Pt recently diagnosed with metastatic cancer. Pt endorsing poor PO intake, increased pain & nausea, and now unable to urinate.      Triage Assessment (Adult)       Row Name 03/24/24 161          Triage Assessment    Airway WDL WDL        Respiratory WDL    Respiratory WDL WDL        Skin Circulation/Temperature WDL    Skin Circulation/Temperature WDL WDL        Cardiac WDL    Cardiac WDL WDL        Peripheral/Neurovascular WDL    Peripheral Neurovascular WDL WDL        Cognitive/Neuro/Behavioral WDL    Cognitive/Neuro/Behavioral WDL WDL

## 2024-03-24 NOTE — TELEPHONE ENCOUNTER
Pt is feeling nausea and has been unable to eat or drink anything for the past few days     Pt was seen on Friday 03/22/2024 by Oncologist who told pt that he was concerned that pt could get dehydrated if things do not turn around with pt able to eat and take in fluids. Pt was instructed to go to ED for fluids if things get worse.     Pt is phoning and very weak, unable to walk without assistance - pt becomes very nauseous when she stands up.     Pt has not had a good urination since yesterday 03/23/2024    Pt has Pancreatic Stage 4 cancer     Per disposition: Go to ED Now     Pt will be going to ED now for evaluation     Care advice given per protocol and when to call back. Pt verbalized understanding and agrees to plan of care.    Shruthi Joyce RN  [facility] Nurse Advisor  3:43 PM 3/24/2024        Reason for Disposition   Unable to walk, or can only walk with assistance (e.g., requires support)    Additional Information   Negative: Shock suspected (e.g., cold/pale/clammy skin, too weak to stand, low BP, rapid pulse)   Negative: Sounds like a life-threatening emergency to the triager   Negative: [1] Nausea or vomiting AND [2] pregnancy < 20 weeks   Negative: Menstrual Period - Missed or Late (i.e., pregnancy suspected)   Negative: Heat exhaustion suspected (i.e., dehydration from heat exposure)   Negative: Motion sickness suspected (i.e., nausea with car, plane, boat, or train travel)   Negative: Anxiety or stress suspected (i.e., nausea with anxiety attacks or stressful situations)   Negative: Traumatic Brain Injury (TBI) suspected   Negative: Nausea (or Vomiting) in a cancer patient who is currently (or recently) receiving chemotherapy or radiation therapy, or cancer patient who has metastatic or end-stage cancer and is receiving palliative care   Negative: Vomiting occurs   Negative: Other symptom is present, see that guideline (e.g., chest pain, headache, dizziness, abdominal pain, colds, sore throat,  etc.).    Protocols used: Nausea-A-AH

## 2024-03-24 NOTE — LETTER
Transition Communication Hand-off for Care Transitions to Next Level of Care Provider    Name: Abigail Lam  : 1955  MRN #: [id number]  Primary Care Provider: KAYCEE HARRINGTON     Primary Clinic: [address]  [facility] [address]     Reason for Hospitalization:  Shortness of breath [R06.02]  Dehydration [E86.0]  Weakness [R53.1]  Abdominal pain, unspecified abdominal location [R10.9]  Malignant neoplasm of pancreas, unspecified location of malignancy (H) [C25.9]  Chest pain, unspecified type [R07.9]  Admit Date/Time: 3/24/2024  4:14 PM  Discharge Date: 3/26/24  Payor Source: Payor: MEDICARE / Plan: MEDICARE / Product Type: Medicare /     Reason for Communication Hand-off Referral: Other inpatient to outpatient    Discharge Plan: OP Palliative and Onc consult     Concern for non-adherence with plan of care:   No  Discharge Needs Assessment:  Needs      Flowsheet Row Most Recent Value   Equipment Currently Used at Home none          Follow-up specialty is recommended: Yes    Follow-up plan:    Future Appointments   Date Time Provider Department Center   3/27/2024  4:05 PM Hunter Schmidt MD [facility]   2024  7:00 AM Preet Rangel MD [facility]   2024  1:15 PM Ludy Barnes [facility]   10/4/2024 10:00 AM Lefty Matthew MD [facility]       Any outstanding tests or procedures:              Key Recommendations:  Patient stated she was quality over quantity of life. Pt will make a decision after consults.    Magnolia Albarran    AVS/Discharge Summary is the source of truth; this is a helpful guide for improved communication of patient story

## 2024-03-24 NOTE — ED PROVIDER NOTES
ED PROVIDER NOTE  March 24, 2024  History     Chief Complaint   Patient presents with    Dehydration     HPI  Abigail Lam is a 68 year old female who is a recent diagnosis of pancreatic cancer arriving today to the emergency department for evaluation of dehydration, generalized fatigue, weakness.  Patient reports onset of symptoms now approximately 3 weeks ago.  The patient was in [place] at the time when she initially began symptoms.  She was seen locally where they did an EKG demonstrate no sign of obvious cardiac strain but reports no significant other workup.  She has followed up with 1 oncologist and had a poor interaction at this time and is tentative follow-up for initiating care in 1.5 weeks time.  She states she has had near 0 oral intake and is continued to have weight loss of approximately 15 pounds.  She reports ongoing fairly severe nausea despite utilization of antiemetics.  She reports increasing constipation and generalized fatigue.  Patient also reports some component of exertional dyspnea.  She reports no chest trauma.  Minimal chest discomfort.  No sensation of palpitations.  She denies fever, chills, nasal congestion, rhinorrhea or cough.  She reports no actual emesis or hematemesis.  No melena or hematochezia.  She denies dysuria, urine frequency but has noted decrease in urine output.      Past Medical History  No past medical history on file.  Past Surgical History:   Procedure Laterality Date    IR LIVER BIOPSY PERCUTANEOUS  3/12/2024     acetaminophen (TYLENOL) 500 MG tablet  atorvastatin (LIPITOR) 10 MG tablet  bethanechol (URECHOLINE) 25 MG tablet  buprenorphine (BUTRANS) 5 MCG/HR WK patch  Cholecalciferol (VITAMIN D) 2000 UNIT tablet  clobetasol (TEMOVATE) 0.05 % ointment  clonazePAM (KLONOPIN) 0.5 MG tablet  clotrimazole-betamethasone (LOTRISONE) 1-0.05 % external cream  Cyanocobalamin (VITAMIN B-12 IJ)  dexAMETHasone (DECADRON) 4 MG tablet  docusate sodium (COLACE) 100 MG  capsule  EPINEPHrine (ANY BX GENERIC EQUIV) 0.3 MG/0.3ML injection 2-pack  estradiol (VAGIFEM) 10 MCG TABS vaginal tablet  famotidine (PEPCID) 40 MG tablet  fluconazole (DIFLUCAN) 100 MG tablet  fluticasone (FLONASE) 50 MCG/ACT spray  lidocaine (LIDODERM) 5 % Patch  LORazepam (ATIVAN) 0.5 MG tablet  Lysine 500 MG CAPS  methocarbamol (ROBAXIN) 500 MG tablet  Omeprazole-Sodium Bicarbonate  MG CAPS  ondansetron (ZOFRAN ODT) 4 MG ODT tab  oxyCODONE (ROXICODONE) 5 MG tablet  Pseudoephedrine HCl (SUDAFED PO)  Psyllium (FIBER) 0.52 G CAPS  SODIUM FLUORIDE 5000 ENAMEL 1.1-5 % GEL  tacrolimus (PROTOPIC) 0.1 % external ointment      Allergies   Allergen Reactions    Bee Venom      Other reaction(s): Edema  Swelling    Diphtheria Toxoid-Tetanus Tox-Thimerosal      Other reaction(s): Edema    Ketorolac      Other reaction(s): Hypertension    Ketorolac Tromethamine     Ketorolac Tromethamine      Other reaction(s): Other (See Comments)  Creates high blood pressure    Nickel Other (See Comments)     Per patch test      Nitrofurantoin      Other reaction(s): Edema  Lower lip swelling.    Propolis      Other reaction(s): Edema  Swelling    Scopolamine Hives     Family History  Family History   Problem Relation Age of Onset    Macular Degeneration Mother     Glaucoma No family hx of      Social History   Social History     Tobacco Use    Smoking status: Never     Passive exposure: Never    Smokeless tobacco: Never   Substance Use Topics    Alcohol use: Not Currently         A medically appropriate review of systems was performed with pertinent positives and negatives noted in the HPI, and all other systems negative.      Physical Exam   BP: (!) 146/76  Pulse: 102  Temp: 98  F (36.7  C)  Resp: 15  SpO2: 96 %      Physical Exam  Vitals and nursing note reviewed.   Constitutional:       General: She is not in acute distress.     Appearance: Normal appearance. She is not ill-appearing, toxic-appearing or diaphoretic.   HENT:       Head: Normocephalic and atraumatic.      Right Ear: External ear normal.      Left Ear: External ear normal.      Nose: Nose normal. No congestion.      Mouth/Throat:      Mouth: Mucous membranes are moist.      Pharynx: Oropharynx is clear. No oropharyngeal exudate.   Eyes:      Extraocular Movements: Extraocular movements intact.      Conjunctiva/sclera: Conjunctivae normal.      Pupils: Pupils are equal, round, and reactive to light.   Cardiovascular:      Rate and Rhythm: Normal rate.      Pulses: Normal pulses.      Heart sounds: Normal heart sounds. No murmur heard.     No friction rub.   Pulmonary:      Effort: Pulmonary effort is normal. No respiratory distress.      Breath sounds: No stridor. No wheezing, rhonchi or rales.   Abdominal:      General: Abdomen is flat. There is no distension.      Tenderness: There is abdominal tenderness. There is no guarding or rebound.   Musculoskeletal:         General: No deformity or signs of injury. Normal range of motion.      Cervical back: Normal range of motion.   Skin:     General: Skin is warm.      Capillary Refill: Capillary refill takes less than 2 seconds.      Coloration: Skin is not pale.      Findings: No bruising or erythema.   Neurological:      General: No focal deficit present.      Mental Status: She is alert and oriented to person, place, and time.      Cranial Nerves: No cranial nerve deficit.      Sensory: No sensory deficit.      Motor: No weakness.   Psychiatric:         Mood and Affect: Mood normal.         Behavior: Behavior normal.         ED Course        Procedures         Results for orders placed or performed during the hospital encounter of 03/24/24 (from the past 24 hour(s))   CBC with platelets differential    Narrative    The following orders were created for panel order CBC with platelets differential.  Procedure                               Abnormality         Status                     ---------                                -----------         ------                     CBC with platelets and d...[[id number]]  Abnormal            Final result                 Please view results for these tests on the individual orders.   Comprehensive metabolic panel   Result Value Ref Range    Sodium 134 (L) 135 - 145 mmol/L    Potassium 4.3 3.4 - 5.3 mmol/L    Carbon Dioxide (CO2) 26 22 - 29 mmol/L    Anion Gap 13 7 - 15 mmol/L    Urea Nitrogen 8.3 8.0 - 23.0 mg/dL    Creatinine 0.62 0.51 - 0.95 mg/dL    GFR Estimate >90 >60 mL/min/1.73m2    Calcium 9.0 8.8 - 10.2 mg/dL    Chloride 95 (L) 98 - 107 mmol/L    Glucose 112 (H) 70 - 99 mg/dL    Alkaline Phosphatase 454 (H) 40 - 150 U/L    AST 79 (H) 0 - 45 U/L    ALT 29 0 - 50 U/L    Protein Total 6.6 6.4 - 8.3 g/dL    Albumin 3.8 3.5 - 5.2 g/dL    Bilirubin Total 0.8 <=1.2 mg/dL   Magnesium   Result Value Ref Range    Magnesium 1.8 1.7 - 2.3 mg/dL   CBC with platelets and differential   Result Value Ref Range    WBC Count 17.0 (H) 4.0 - 11.0 10e3/uL    RBC Count 4.07 3.80 - 5.20 10e6/uL    Hemoglobin 12.4 11.7 - 15.7 g/dL    Hematocrit 37.4 35.0 - 47.0 %    MCV 92 78 - 100 fL    MCH 30.5 26.5 - 33.0 pg    MCHC 33.2 31.5 - 36.5 g/dL    RDW 12.7 10.0 - 15.0 %    Platelet Count 381 150 - 450 10e3/uL    % Neutrophils 67 %    % Lymphocytes 16 %    % Monocytes 10 %    % Eosinophils 5 %    % Basophils 1 %    % Immature Granulocytes 1 %    NRBCs per 100 WBC 0 <1 /100    Absolute Neutrophils 11.6 (H) 1.6 - 8.3 10e3/uL    Absolute Lymphocytes 2.6 0.8 - 5.3 10e3/uL    Absolute Monocytes 1.7 (H) 0.0 - 1.3 10e3/uL    Absolute Eosinophils 0.8 (H) 0.0 - 0.7 10e3/uL    Absolute Basophils 0.1 0.0 - 0.2 10e3/uL    Absolute Immature Granulocytes 0.2 <=0.4 10e3/uL    Absolute NRBCs 0.0 10e3/uL   Troponin T, High Sensitivity   Result Value Ref Range    Troponin T, High Sensitivity 72 (H) <=14 ng/L   UA with Microscopic reflex to Culture    Specimen: Urine, Midstream   Result Value Ref Range    Color Urine Yellow Colorless,  Straw, Light Yellow, Yellow    Appearance Urine Slightly Cloudy (A) Clear    Glucose Urine Negative Negative mg/dL    Bilirubin Urine Small (A) Negative    Ketones Urine Trace (A) Negative mg/dL    Specific Gravity Urine 1.029 1.003 - 1.035    Blood Urine Negative Negative    pH Urine 6.0 5.0 - 7.0    Protein Albumin Urine 50 (A) Negative mg/dL    Urobilinogen Urine 6.0 (A) Normal, 2.0 mg/dL    Nitrite Urine Negative Negative    Leukocyte Esterase Urine Trace (A) Negative    Bacteria Urine Few (A) None Seen /HPF    Mucus Urine Present (A) None Seen /LPF    RBC Urine 0 <=2 /HPF    WBC Urine 2 <=5 /HPF    Squamous Epithelials Urine 4 (H) <=1 /HPF    Hyaline Casts Urine 6 (H) <=2 /LPF    Narrative    Urine Culture not indicated   EKG 12-lead, tracing only   Result Value Ref Range    Systolic Blood Pressure  mmHg    Diastolic Blood Pressure  mmHg    Ventricular Rate 86 BPM    Atrial Rate 86 BPM    IL Interval 144 ms    QRS Duration 76 ms     ms    QTc 428 ms    P Axis 67 degrees    R AXIS -16 degrees    T Axis 76 degrees    Interpretation ECG       Sinus rhythm  Septal infarct , age undetermined  Abnormal ECG     Lactic acid whole blood   Result Value Ref Range    Lactic Acid 1.6 0.7 - 2.0 mmol/L   XR Chest 2 Views    Narrative    EXAM: XR CHEST 2 VIEWS  3/24/2024 5:20 PM      HISTORY: SOB    COMPARISON: Radiograph 3/8/2024. CT 3/8/2024.    FINDINGS: Two views of the chest. Trachea is midline. Normal  cardiomediastinal silhouette. No pleural effusion, consolidation or  pneumothorax. Soft tissue density overlying the mid hemithoraces  bilaterally on the PA view correlates with breast implants. No acute  osseous abnormalities.      Impression    IMPRESSION: No acute cardiopulmonary findings.    I have personally reviewed the examination and initial interpretation  and I agree with the findings.    MOE HORTON MD         SYSTEM ID:  [id number]   D dimer quantitative   Result Value Ref Range    D-Dimer  Quantitative >20.00 (HH) 0.00 - 0.50 ug/mL FEU    Narrative    This D-dimer assay is intended for use in conjunction with a clinical pretest probability assessment model to exclude pulmonary embolism (PE) and deep venous thrombosis (DVT) in outpatients suspected of PE or DVT. The cut-off value is 0.50 ug/mL FEU.    For patients 50 years of age or older, the application of age-adjusted cut-off values for D-Dimer may increase the specificity without significant effect on sensitivity. The literature suggested calculation age adjusted cut-off in ug/L = age in years x 10 ug/L. The results in this laboratory are reported as ug/mL rather than ug/L. The calculation for age adjusted cut off in ug/mL= age in years x 0.01 ug/mL. For example, the cut off for a 76 year old male is 76 x 0.01 ug/mL = 0.76 ug/mL (760 ug/L).    M Odilia et al. Age adjusted D-dimer cut-off levels to rule out pulmonary embolism: The ADJUST-PE Study. BRANDI 2014;311:6412-0061.; HJ Paco et al. Diagnostic accuracy of conventional or age adjusted D-dimer cutoff values in older patients with suspected venous thromboembolism. Systemic review and meta-analysis. BMJ 2013:346:f2492.   Troponin T, High Sensitivity   Result Value Ref Range    Troponin T, High Sensitivity 70 (H) <=14 ng/L     Medications   sodium chloride 0.9% BOLUS 1,000 mL (1,000 mLs Intravenous $New Bag 3/24/24 1850)   oxyCODONE (ROXICODONE) tablet 5 mg (has no administration in time range)   docusate sodium (COLACE) capsule 100 mg (has no administration in time range)   sodium phosphate (FLEET ENEMA) 1 enema (has no administration in time range)   bisacodyl (DULCOLAX) suppository 20 mg (has no administration in time range)   sodium chloride 0.9% BOLUS 1,000 mL (0 mLs Intravenous Stopped 3/24/24 1850)   ondansetron (ZOFRAN) injection 4 mg (4 mg Intravenous $Given 3/24/24 1813)   acetaminophen (TYLENOL) tablet 1,000 mg (1,000 mg Oral $Given 3/24/24 1858)   iopamidol (ISOVUE-370) solution 53  mL (53 mLs Intravenous $Given 3/24/24 1937)   sodium chloride (PF) 0.9% PF flush 90 mL (90 mLs Intravenous $Given 3/24/24 1937)             Critical care was not performed.     Medical Decision Making  The patient's presentation was of moderate complexity (an acute complicated injury).    The patient's evaluation involved:  review of external note(s) from 3+ sources (see separate area of note for details)  review of 3+ test result(s) ordered prior to this encounter (see separate area of note for details)  ordering and/or review of 3+ test(s) in this encounter (see separate area of note for details)    The patient's management necessitated high risk (a decision regarding hospitalization).    Assessments & Plan (with Medical Decision Making)     Abigail Lam is a 68 year old female who is a recent diagnosis of pancreatic cancer arriving today to the emergency department for evaluation of dehydration, generalized fatigue, weakness.  Upon arrival patient currently noted to be alert.  Presently afebrile and hemodynamically stable with mild tachycardia.  She appears to be uncomfortable is nontoxic.  GCS 15.  No sign of obvious neurovascular deficit.  She is presently speaking in full sentences without signs of increased work of breathing.  She does report some exertional dyspnea.  Lower clinical suspicion for PE, pneumothorax, lower respiratory tract disease such as pneumonia.  Unlikely to be pneumothorax.  She does have some discomfort in the abdomen predominantly upper abdomen.  She reports constipation which certainly may be possible in conjunction with utilization of oxycodone and dehydration.  Lower suspicion for bowel obstruction, perforated viscus, diverticulitis or colitis.  Unlikely to be significant progression of cancer since recent CT.  I have reviewed medical chart including recent clinic visits and CT imaging with radiologic report.  She would benefit from laboratory studies, hydration, EKG, troponin,  imaging of the chest.    Laboratory studies in general reassuring with no significant lecture light abnormality, anemia, or TORITO appreciated.  Modest leukocytosis.  I did review chart with note of similar elevation in white blood cell count over the past several lab values.  No clinical evidence of obvious infection.  I did draw cultures but would hold on antibiotics at this time.    Otherwise troponin modestly elevated.  I did send a delta level which demonstrates no significant elevation.  Low suspicion for ACS.  With recent prolonged flight to [place] and exertional dyspnea with ongoing chest pain I did add a D-dimer which is fairly significant elevated I do believe the patient would benefit from CT imaging of the chest.  With constellation of symptoms including increasing weakness, poor oral intake, persistent fairly severe nausea, poor distal output and ongoing chest discomfort with elevation troponin we did discuss plan for acute hospitalization for continued monitoring and workup.  Pending at time of signout is CTA discussed with my colleague.    I have reviewed the nursing notes.    I have reviewed the findings, diagnosis, plan and need for follow up with the patient.    New Prescriptions    No medications on file       Final diagnoses:   Weakness   Dehydration   Chest pain, unspecified type   Shortness of breath   Abdominal pain, unspecified abdominal location   Malignant neoplasm of pancreas, unspecified location of malignancy (H)       JOSE HURLEY MD    3/24/2024   [facility] EMERGENCY DEPARTMENT     Jose Hurley MD  03/24/24 1941

## 2024-03-25 PROBLEM — Z17.0 MALIGNANT NEOPLASM OF OVERLAPPING SITES OF RIGHT BREAST IN FEMALE, ESTROGEN RECEPTOR POSITIVE (H): Status: ACTIVE | Noted: 2024-01-01

## 2024-03-25 PROBLEM — C50.811 MALIGNANT NEOPLASM OF OVERLAPPING SITES OF RIGHT BREAST IN FEMALE, ESTROGEN RECEPTOR POSITIVE (H): Status: ACTIVE | Noted: 2024-01-01

## 2024-03-25 NOTE — CONSULTS
Palliative Care Consultation Note  [facility]      Patient: Abigail Lam  Date of Admission:  3/24/2024    Requesting Clinician / Team: Internal medicine  Reason for consult: Symptom management  Goals of care  Patient and family support     Recommendations & Counseling     ADVANCE CARE PLANNING:  No health care directive on file. Per  informed consent policy, next of kin should be involved if patient becomes unable.  There is no POLST form on file, defer to patient and/or next of kin for decisions   Code status: Full Code    MEDICAL MANAGEMENT:   SKYLAR symptoms in detail today due to pt feeling fatigued. Will attempt to address again tomorrow.     #Pain,  Epigastric and bilat spine at ~T12 level. Metastatic pancreatic cancer with mets to liver, lymph nodes and peritoneum.   Tylenol 1000mg q6h prn - rec scheduling   Butrans patch 5mgc/hr/week   Oxycodone 5mg q3h prn  Robaxin TID   Decadron 4mg daily - started today, had not started OP yet d/t concerns with side effects.   Lidocaine patch daily prn   Physical Therapy  She is being evaluated for celiac axis plexus block and will have that done in the near future via endoscopy.     #Anxiety, Generalized Anxiety  Notes has used hydroxyzine in the past, caused worsening of her dry mouth thus wants to avoid.   PTA Clonazepam 0.5mg bedtime (per home regimen)  Offered hydroxyzine as prn throughout day, but pt declined due to previously caused dry mouth.   Could consider prn clonazepam for anxiety.     # Weight loss  Poor intake past 3 weeks per patient.   Dietician consulted  Briefly discussed trial of olanzapine at bedtime to help with nausea, sleep, and appetite - pt wants to defer for now.     # Dry Mouth  Medication related vs poor PO intake/dehydration.  Biotene prn - pt reports she is allergic. Will discontinue.  Frequent sips of water, ice chips  Sugarless gums and candies can stimulate salivary reflexes.    Avoid  medications that could cause worsening of dry mouth.      #Nausea  Long-standing hx of chronic nausea, dyspepsia, and heartburn. Is on PPI and H2B daily long term and zofran. Oxycodone worsened her nausea.   -Pharmacologic management   Ondansetron (Zofran) 4mg q6h prn   Prochlorperazine (COMPAZINE) 5mg q6h prn   Decadron 4mg daily  Pepsid 40mg daily  Protonix 40mg daily + sodium bicarb    -Nonpharmacologic management  Small, frequent intake  Assess and avoid aggravating factors  Accupressure (C-band)  Aromatherapy, alcohol swabs known to be effective    #Constipation,OIC  Sennoside (Senokot) prn   Polyethylene glycol (MiraLAX) daily  Colace daily   Dulcolax suppository prn     PSYCHOSOCIAL/SPIRITUAL SUPPORT:  Family:  at bedside, very involved.   Jenny community: [facility]     Palliative Care will continue to follow. Thank you for the consult and allowing us to aid in the care of Abigail Lam.    These recommendations have been discussed with primary team.    DONNA Jimenez CNP  Securely message with [employer] (more info)  Text page via [facility] Paging/Directory     Assessment      Abigail Lam is a 68-year-old female with past medical history of ER positive breast cancer on extended therapy since about 2/2006 s/p textured breast implants, low bone density/osteoporosis, GERD with a very recent diagnosis of metastatic pancreatic cancer presented to ED 3/24 with nausea, vomiting, constipation, intractable back pain and shortness of breath.  Found to have acute pulmonary embolism, several nonocclusive subsegmental pulmonary emboli.     Today, the patient was seen for:  Abdominal pain, generalized  Malignant neoplasm of body of pancreas  Nausea related to cancer  Dehydration  Goals of Care  Palliative, initial consult    Palliative Care Summary:   Met with patient, , and son at bedside.   I introduced our role as an extra layer of support and how we help patients and families dealing with serious,  potentially life-limiting illnesses. I explained the composition of the palliative care team.  Palliative care helps patients and families navigate their care while focusing on the whole person; providing emotional, social and spiritual support  Palliative care often assists with symptom management, information sharing about what to expect from the illness, available treatment options and what effect those options may have on the disease course, and provide effective communication and caring support.    Patient voices various frustrations with her hospital stay today.  States they are struggling with understanding what the options available are.  She is wondering what others with similar cancer have experienced -such as average life expectancy (with and without treatment).  She reports that fluids have helped her symptoms/helped she is hopeful to be able to receive IV fluids to help her feel better after discharge. States she wants to focus on quality of life, and not quantity. Her mother in law chose not to treat her pancreatic cancer and Abigail observed that she had an excellent qol for most of that time.     Has not been able to eat or drink anything for the past 3 weeks.  She has had experiences with nausea and reports she struggled with this during pregnancies as well. Zofran works, but feels the effects are wearing off with time/usage. Has not tried compazine.  Started the steroids this morning, has not noticed any side effects.     She states that the fleets enema she received this morning helped her nausea.  We discussed senna, MiraLAX -she notes that the enemas have worked the best.  She is wondering about having this on a schedule.    Did not have time to discuss pain, nausea, or constipation in detail today - patient reports feeling fatigued, would like me to come back later.     Prognosis, Goals, & Planning:    Functional Status just prior to this current hospitalization:  ECOG1 (Restricted in physically  strenuous activity but ambulatory and able to carry out work of a light or sedentary nature)    Prognosis, Goals, and/or Advance Care Planning:  We discussed general treatment options (full/restorative, selective/conservatives, and comfort only/hospice). We then discussed how these specifically apply to patient. I.e. discussed options for receiving IVF in the outpatient setting, although difficult to determine how this would work. May better be able to make a plan for this once we know what patient's overall goals are   Discussed that there are more than 2 options - option 1 being to pursue chemotherapy treatments.  Discussed that chemotherapy treatments would be palliative, meaning the intent would not be a cure, but rather more time/prevention of the cancer from becoming worse.  Another option would be more focusing on comfort, more Hospice-based approach.  Did not go into detail about hospice philosophy/what this may look like.   Another option could be not pursuing chemotherapy, focusing on comfort and quality of life.  This may include supporting Abigail with medications to manage symptoms, IVF, focusing on QOL. Going to the hospital if needed.   Family has questions about treatment options - what that would look like, how much time it may give her, side effects. They are also wondering what to expect with not pursuing treatments. Discussed that I do not know the answer to these, but can reach out to oncology and find out.     Code Status was addressed today:   No      Patient's decision making preferences: shared with support from loved ones        Patient has decision-making capacity today for complex decisions:Intact            Coping, Meaning, & Spirituality:   Mood, coping, and/or meaning in the context of serious illness were addressed today: Yes    Social:   Living situation: Lives with  Luis F. 2 children; grandchildren    Medications:  I have reviewed this patient's medication profile and  medications from this hospitalization. Notable medications:    Miralax daily   Protonix daily with sodium bicarb   Robaxin TID   Lidocaine patch   Butrans patch     PRNs/24 hour usage   Senna prn    Oxycodone 5mg prn - x2  Zofran x3  Ativan 0.5mg x1  Klonopin x1  APAP 1g x1    [place] Board of Pharmacy Data Base Reviewed: Yes:   reviewed - bup script 3/18, oxycodone script for 30tabs 3/11. Has been receiving Clonazepam scripts for awhile, last time 3/8. Receiving those monthly over last year.     ROS:  Comprehensive ROS is reviewed and is negative except as here & per HPI:     Physical Exam   Vital Signs with Ranges  Temp:  [97.7  F (36.5  C)-98.4  F (36.9  C)] 98.1  F (36.7  C)  Pulse:  [] 87  Resp:  [12-18] 18  BP: (139-169)/() 146/67  SpO2:  [93 %-99 %] 93 %  89 lbs 0 oz    PHYSICAL EXAM:  Constitutional: healthy, alert, no distress, and underweight   Cardiovascular: negative, negative findings: regular rate and rhythm  Respiratory: negative findings: normal respiratory rate and rhythm  Psychiatric: mentation appears normal, affect normal/bright, and anxious    Data reviewed:  Recent Labs   Lab 03/25/24  0904 03/24/24  2309 03/24/24  1629 03/19/24  1031   WBC 18.5* 14.7* 17.0* 16.4*   HGB 12.0 11.4* 12.4 13.0   MCV 95 94 92 91    339 381 379   INR 1.45*  --   --   --      --  134* 135   POTASSIUM 3.6  --  4.3 3.7   CHLORIDE 101  --  95* 95*   CO2 22  --  26 29   BUN 6.1*  --  8.3 8.5   CR 0.52  --  0.62 0.56   ANIONGAP 13  --  13 11   DENA 8.4*  --  9.0 9.2   GLC 87  --  112* 160*   ALBUMIN 3.5  --  3.8 4.2   PROTTOTAL 5.9*  --  6.6 7.2   BILITOTAL 0.8  --  0.8 0.8   ALKPHOS 384*  --  454* 429*   ALT 23  --  29 27   AST 65*  --  79* 57*     ECHO 3/25/2024  Interpretation Summary   Technically difficult study.Extremely poor acoustic windows.   Global and regional left ventricular function is normal with an EF of 60-65%.   Right ventricular function, chamber size, wall motion, and  thickness are normal.   Pulmonary artery systolic pressure cannot be assessed.   The inferior vena cava is normal.   No pericardial effusion is present.     CT Chest 3/24/2024:  IMPRESSION:   1. Exam is positive for acute pulmonary embolism. Subsegmental pulmonary emboli involving the right lower and left lower lobes.    Evidence for right heart strain or increased right heart pressures? is not present.   2. Please refer to separate dictated CT abdomen and pelvis for dedicated findings    Medical Decision Making       MANAGEMENT DISCUSSED with the following over the past 24 hours: IM, oncology   NOTE(S)/MEDICAL RECORDS REVIEWED over the past 24 hours: oncology, IM, nursing notes, ED notes, outpatient palliative, outpatient oncology  Tests ORDERED & REVIEWED in the past 24 hours:  - See lab/imaging results included in the data section of the note  SUPPLEMENTAL HISTORY, in addition to the patient's history, over the past 24 hours obtained from:   - Spouse or significant other  - Son  Medical complexity over the past 24 hours:  - Decision to DE-ESCALATE CARE based on prognosis  - Prescription DRUG MANAGEMENT performed  80 MINUTES SPENT BY ME on the date of service doing chart review, history, exam, documentation & further activities per the note.

## 2024-03-25 NOTE — CONSULTS
Care Management Initial Consult    General Information  Assessment completed with: Patient Patient's spouse Kevin, Patient's daughter Violet, Patient's son Dada,    Type of CM/SW Visit: Initial Assessment    Primary Care Provider verified and updated as needed:   Yes. Patient's PCP is Lizette Finn at [facility].   Readmission within the last 30 days: no previous admission in last 30 days      Reason for Consult: discharge planning  Advance Care Planning: Advance Care Planning Reviewed:  Patient states she has a completed Health Care Directive at home. Patient's spouse will try to bring the Health Care HCD spouse will bring it in to the hospital to scan into the chart.    Communication Assessment  Patient's communication style: spoken language (English or Bilingual)    Hearing Difficulty or Deaf: no   Wear Glasses or Blind: yes    Cognitive  Cognitive/Neuro/Behavioral: WDL                      Living Environment:   People in home: spouse     Current living Arrangements: house      Able to return to prior arrangements: yes       Family/Social Support:  Care provided by:  Patient states that she and her  take care of each other.  Provides care for:  Patient states that she and her  take care of each other.  Marital Status:    (Kevin Phone [phone] Dada Son [phone], Violet daughter [phone])          Description of Support System: Supportive, Involved         Current Resources:   Patient receiving home care services: No     Community Resources:  None identified during this Care Management Assessment.   Equipment currently used at home: none  Supplies currently used at home:  None.     Employment/Financial:  Employment Status: employed part-time        Financial Concerns:  Not assessed at this time.     Does the patient's insurance plan have a 3 day qualifying hospital stay waiver?  No    Lifestyle & Psychosocial Needs:  Social Determinants of Health     Food  "Insecurity: Not on file   Depression: Not at risk (7/7/2021)    PHQ-2     PHQ-2 Score: 0   Housing Stability: Not on file   Tobacco Use: Low Risk  (3/22/2024)    Patient History     Smoking Tobacco Use: Never     Smokeless Tobacco Use: Never     Passive Exposure: Never   Financial Resource Strain: Not on file   Alcohol Use: Not on file   Transportation Needs: Not on file   Physical Activity: Not on file   Interpersonal Safety: Not on file   Stress: Not on file   Social Connections: Not on file       Functional Status:  Prior to admission patient needed assistance:   Dependent ADLs:: Independent  Dependent IADLs:: Independent       Mental Health Status:  Mental Health Status: No Current Concerns       Chemical Dependency Status:  Chemical Dependency Status: No Current Concerns       Values/Beliefs:  Spiritual, Cultural Beliefs, [employer] Practices, Values that affect care:  Not assessed at this time.           Additional Information: Per H&P, \"Abigail Lam is a 68-year-old female with past medical history of ER positive breast cancer on extended therapy since about 2/2006 s/p textured breast implants, low bone density/osteoporosis, GERD with a very recent diagnosis of metastatic pancreatic cancer presented to ED with nausea, vomiting, constipation, intractable back pain and shortness of breath.  Found to have acute pulmonary embolism, several nonocclusive subsegmental pulmonary emboli\"    Care management team consulted d/t elevated unplanned hospital readmission risk score (PAULINA). Chart reviewed. This writer met with patient at bedside to complete the care management assessment. Writer introduced self and the role of the .     Social work provided the following support for the patient during this patient interaction: active listening, facilitating a calm, non-judgmental conversation.     SW and RNCC to continue to follow and assist with discharge plan as appropriate.     Aretha Marie MSW, LGSW  [facility] Social " Worker Covering 5A beds:[id number]  5C beds [id number] (no BMT pt's)   Unit  Phone Number [phone]  [facility]  Social Work & Care Management Department

## 2024-03-25 NOTE — PLAN OF CARE
Goal Outcome Evaluation:      Plan of Care Reviewed With: patient, spouse (patient's daughter and patient's son)      Outcome Evaluation: Discharge needs are not known at this time. SW and RNCC will continue to follow to assist with any discharge needs.    Aretha GONZALEZ, [facility]  Covering 5A beds:[phone]  5C beds [id number] (no BMT pt's)   Unit  Phone Number [phone]  [facility]  Social Work & Care Management Department

## 2024-03-25 NOTE — PLAN OF CARE
Goal Outcome Evaluation:           Overall Patient Progress: no changeOverall Patient Progress: no change    Outcome Evaluation: See RD note 3/25    YNES Verde, RD, LD  Available on [employer]: M-F (7:00-3:30) 5A/7B Clinical Dietitian  Weekend/Holiday (7:00-3:30) - Weekend Clinical Dietitian      **Clinical Dietitians are no longer available by pager

## 2024-03-25 NOTE — CONSULTS
Hematology / Oncology  Initial Consultation Note   Date of Service: 03/25/2024  Patient: Abigail Lam  MRN: [id number]  Admission Date: 3/24/2024  Hospital Day # 1  Cancer Diagnosis: Metastatic Pancreatic Adenocarcinoma      Assessment & Plan:  69 y/o female with previously treated ER+ Stage I Breast cancer presenting with n/v/ fatigue, dyspnea, found to have pulmonary embolism and newly recently diagnosed Stage IV Pancreatic Adenocarcinoma    # Stage IV Pancreatic Adenocarcinoma  - Extensive conversation with family today during rounds (see below for summary)  - Recommend Palliative Medicine consult to assist with symptom management  - We will continue to follow and as patient approaches discharge we will arrange follow up for her to discuss further treatment options. Continue supportive cares per Palliatives Medicine and Primary care  - Recommend nutrition consult given her lack of appetite   - Family communication: We will defer communication with family to primary team unless otherwise requested or noted here. We discussed with Patient's  and Son at bedside, diagnosis, prognosis, and treatment options  -We will continue to follow along and support as needed    # Acute Pulmonary Embolism  -On Heparin gtt, remains on room air this AM.   -Anticoagulation per primary team    Oncologic History (Copied and updated)  She has a past  history of Stage I, node negative, breast cancer.  It was multifocal.  ER, IL positive, grade 1, negative for angiolymphatic invasion. One tumor was 2 cm, another was 0.5.  Had a low Oncotype score of 7.  She was treated with bilateral mastectomy, oophorectomy, started Tamoxifen in 2006 and took it for 5 years.  She has now been on extended endocrine therapy with Arimidex since June 2011.  She had been receiving q 6 month zolendronate but this was stopped Dec 2013 as it might have been causing her rib musculskeletal symptoms.  She switched to tamoxifen in June 2017. She  stopped tamoxifen in December 2021.     She had routine CT Scans that showed multiple liver lesions with concern for Pancreatic Cancer.      On March 12, 2024 she had ultrasound directed biopsy of her liver.  This showed an adenocarcinoma with focal mucin production moderately differentiated.  It was felt that this was an origin from pancreas, upper GI tract and had a biliary size.  It was felt not to be a breast cancer.     She also had a PET/CT done on March 15, 2024.  This demonstrated multiple liver lesions and a small area in the sacrum.  There are also a necrotic lesion in her distal pancreas body/tail which is 3.5 x 2.0 cm.  She in addition had some lymph node involvement as well as some small peritoneal nodules.    Patient was seen and plan of care was discussed with attending physician Dr. Little.    Thank you for the opportunity to partake in this patient's plan of care. Please do not hesitate to page with questions. We will continue to follow .     Frank Don MD  Hematology/Oncology/BMT Fellow  Pager: [phone]    ___________________________________________________________________    History of Present Illness:      Extensive discussion today about patients diagnosis, prognosis, and treatment options. She has made it clear that quality of life is important to her and that she wishes to spend as much time with her kids and her 3 grand children. Her  was present throughout our conversation and her Son arrived in the middle and was updated with all questions answered.    She relates that her mother in law had pancreatic cancer and did not opt for therapy and she has personally known a couple of friends that have unfortunately passed from pancreatic cancer after about 2 years of therapy. We discussed that she has a Stage IV pancreatic cancer and newly discovered splenic infarcts and pulmonary Embolism, for which she is being treated by primary team. Given her metastatic disease we discussed that  "surgery and radiation is likely not an options for cure, but radiation may be offered for palliation in areas causing pain or symptoms. We further discussed that systemic therapy with chemotherapy can be in option for palliative intent as given her metastatic disease, this is not curable. Without chemotherapy we discussed Overall survival is on the matter of months,  and with chemotherapy can provide mostly palliation and hopefully provide some alleviation of pain and reduce her symptoms if tumor responds, and chemotherapy may be able to prolong life by weeks-months while providing palliation, but this would be depending on how her tumor responds after restaging scans. Patient and family are understand and will discuss and think about what next steps they wish to take    She will be set up with one of the GI oncologists after discharge, and currently her main concerns are pain and appetite. She used to follow up with Palliative Care who has been helpful in helping with symptoms management which we encouraged to continue follow up with palliative medicine.      Physical Exam:    Blood pressure (!) 155/62, pulse 92, temperature 97.8  F (36.6  C), temperature source Oral, resp. rate 16, height 1.562 m (5' 1.5\"), weight 40.4 kg (89 lb), SpO2 97%, not currently breastfeeding.    Constitutional: awake, alert, cooperative  Respiratory: No increased work of breathing, good air exchange  GI:  soft, non-distended  Neurologic: Awake, alert, oriented to name, place and time.  Neuropsychiatric: Anxious and normal eye contact.    Labs & Studies: I personally reviewed the following studies:  ROUTINE LABS (Last four results):  CMP  Recent Labs   Lab 03/25/24  0904 03/24/24  1629 03/19/24  1031    134* 135   POTASSIUM 3.6 4.3 3.7   CHLORIDE 101 95* 95*   CO2 22 26 29   ANIONGAP 13 13 11   GLC 87 112* 160*   BUN 6.1* 8.3 8.5   CR 0.52 0.62 0.56   GFRESTIMATED >90 >90 >90   DENA 8.4* 9.0 9.2   MAG  --  1.8  --    PROTTOTAL 5.9* " 6.6 7.2   ALBUMIN 3.5 3.8 4.2   BILITOTAL 0.8 0.8 0.8   ALKPHOS 384* 454* 429*   AST 65* 79* 57*   ALT 23 29 27     CBC  Recent Labs   Lab 03/25/24  0904 03/24/24  2309 03/24/24  1629 03/19/24  1031   WBC 18.5* 14.7* 17.0* 16.4*   RBC 3.80 3.67* 4.07 4.17   HGB 12.0 11.4* 12.4 13.0   HCT 36.1 34.3* 37.4 38.0   MCV 95 94 92 91   MCH 31.6 31.1 30.5 31.2   MCHC 33.2 33.2 33.2 34.2   RDW 12.9 12.6 12.7 12.5    339 381 379     INR  Recent Labs   Lab 03/25/24  0904   INR 1.45*     Medications list for reference:  Current Facility-Administered Medications   Medication    acetaminophen (TYLENOL) tablet 1,000 mg    atorvastatin (LIPITOR) tablet 10 mg    buprenorphine (BUTRANS) 5 MCG/HR WK patch 1 patch    And    buprenorphine (BUTRANS) Patch in Place    calcium carbonate (TUMS) chewable tablet 1,000 mg    clonazePAM (klonoPIN) tablet 0.5 mg    dexAMETHasone (DECADRON) tablet 4 mg    docusate sodium (COLACE) capsule 100 mg    famotidine (PEPCID) tablet 40 mg    [Held by provider] fluticasone (FLONASE) 50 MCG/ACT spray 1 spray    heparin infusion 25,000 units in D5W 250 mL ANTICOAGULANT    lactated ringers infusion    Lidocaine (LIDOCARE) 4 % Patch 1 patch    lidocaine (LMX4) cream    lidocaine 1 % 0.1-1 mL    methocarbamol (ROBAXIN) tablet 500 mg    naloxone (NARCAN) injection 0.2 mg    Or    naloxone (NARCAN) injection 0.4 mg    Or    naloxone (NARCAN) injection 0.2 mg    Or    naloxone (NARCAN) injection 0.4 mg    omeprazole (PriLOSEC) CR capsule 20 mg    ondansetron (ZOFRAN ODT) ODT tab 4 mg    Or    ondansetron (ZOFRAN) injection 4 mg    oxyCODONE (ROXICODONE) tablet 5 mg    Patient is already receiving anticoagulation with heparin, enoxaparin (LOVENOX), warfarin (COUMADIN)  or other anticoagulant medication    polyethylene glycol (MIRALAX) Packet 17 g    prochlorperazine (COMPAZINE) injection 5 mg    Or    prochlorperazine (COMPAZINE) tablet 5 mg    Or    prochlorperazine (COMPAZINE) suppository 12.5 mg     senna-docusate (SENOKOT-S/PERICOLACE) 8.6-50 MG per tablet 1 tablet    Or    senna-docusate (SENOKOT-S/PERICOLACE) 8.6-50 MG per tablet 2 tablet    sodium chloride (PF) 0.9% PF flush 3 mL    sodium chloride (PF) 0.9% PF flush 3 mL    sodium phosphate (FLEET ENEMA) 1 enema    Vitamin D3 (CHOLECALCIFEROL) tablet 50 mcg     Facility-Administered Medications Ordered in Other Encounters   Medication    lactated ringers BOLUS 1,000 mL    ondansetron (ZOFRAN) injection 4 mg              MEDICAL ONCOLOGY ATTENDING PHYSICIAN CONSULTATION NOTE - Solid Tumor Oncology Team 2  I evaluated this patient's case separately and also with the oncology fellow, Dr. Frank Don. The note above reflects my assessment and plan. I have personally reviewed lab results, and vital and radiology results. >50% of time was spent in assessment and coordination of care; >=35 minutes.    Patient:  Abigail Lam  Admission Date: 3/24/24  Hospital Day # 2  Cancer Diagnosis:  metastatic/stage IV pancreatic cancer  Primary Outpatient GI Oncologist:  Dr. Jonathan De La Garza; patient also wishes to consult with Dr. Preet Rangel  Outpatient breast cancer Oncologist: Dr. Addy Matthew  Current Treatment Plan:  TBD    Ms. Lam is a 68-year-old woman with a history of ER/ME positive stage I node negative breast cancer, first diagnosed nearly 2 decades ago, and for which she has been under the longitudinal care of oncology colleague Dr. Lefty Matthew.  More recently, earlier this month, she was diagnosed with a metachronous cancer, a stage IV/metastatic form of pancreas adenocarcinoma.    She developed symptoms that led to a March 12, 2024 biopsy of a liver mass found on scans; biopsy was diagnostic of moderately differentiated adenocarcinoma with focal mucin production.  This diagnosis, in combination with scans that detected the pancreatic tail mass, with numerous liver masses with peritoneal carcinomatosis, are most consistent with a stage IV/metastatic form of pancreas  adenocarcinoma.  For this reason, she was referred to [facility] oncology by her breast oncologist Dr. Matthew.  Last Friday, March 22, she met with [facility]. oncologist Dr. Jonathan De La Garza for initial consultation; she described Dr. Matthew, and describes to be in detail again today during our inpatient consultation encounter, that she additionally wishes to meet in succession with other [facility] oncologists here at [facility], including Dr. Preet Rangel and Dr. Parmjit Foy.    Prior to the biopsy, she had taken a trip overseas to the , and in quick succession of the last few weeks has had workup that led to confirmation of pancreas cancer.  She developed dehydration and other symptoms that lead to ER evaluation, and a new finding as of the evening of March 24 was extensive pulmonary embolus.    Today her  is present throughout today's encounter, and her son Dada also arrived midway through the encounter.  I held in depth and broad discussion of the natural course, biology, diagnosis, and treatment of metastatic pancreas cancer, in response to her overall questions about treatment options, approaches to supportive care, and other issues, including nutrition and pain management.  She is long-established with Dr. Schmidt from our oncology palliative care clinic, and we recommend inpatient Palliative care consultation for urgent evaluation of current pain control and optimization of pain management, that can be bridged to the outpatient setting.  In the meantime she has a second medical oncology opinion already set up for next Monday, April 1, at which time she will meet with Dr. Rangel and decide further how and with what oncologist she wishes to establish care.  She would also benefit from nutrition consultation as inpatient if possible, to address her and her family's questions about nutritional aspect of care.    Santiago Little MD, PhD  Associate Professor of Medicine, Hematology, Oncology and Transplantation

## 2024-03-25 NOTE — PROGRESS NOTES
CLINICAL NUTRITION SERVICES - ASSESSMENT NOTE     Nutrition Prescription    RECOMMENDATIONS FOR MDs/PROVIDERS TO ORDER:  Pending pt's GOC and ability to tolerate oral intake, could consider nutrition support.     If nutrition support is within pt's GOC:  - Would recommend enteral nutrition if medically able to keep the GI tract stimulated, recommend post-pyloric FT placement d/t nausea.  - If ppFT is not medically appropriate, then could consider TPN    Malnutrition Status:    Unable to determine due to unable to complete NFPE    Recommendations already ordered by Registered Dietitian (RD):  None at this time    Future/Additional Recommendations:  - Monitor GOC/potential for nutrition support, appetite/po intake, wt trends, pertinent labs, bowel movements, GI symptoms    - If enteral nutrition becomes part of pt's nutrition POC, would recommend the following regimen:  Osmolite 1.5 Gage (or equivalent) @ goal of  40ml/hr  (960ml/day) provides: 1440 kcals, 60 g PRO, 731 ml free H20, 195 g CHO, and 0 g fiber daily.  Initiate at 10 mL/hr and advance by 10 mL/hr q8 hrs to goal rate of 40 mL/hr  Monitor lytes and do not initiate or adv unless WNL, pt is at risk for refeeding  Recommend Thiamine 100 mg x7 days    - If parenteral nutrition becomes part of pt's nutrition POC, would recommend the following regimen:  Dosing weight:  40.4 kg  Access: Central (would need placement)  Initial parameters (per day)  Volume:  1080 mL or per PharmD  Dextrose: 60 g  AA: 60 g  Lipids: 250 mL 20%, 5 days per week   Dextrose titration:   Monitor lytes and if within acceptable parameters (Mg++ > or = 1.5, K+ is > or = 3, and PO4 > or = 1.9), increase dextrose by 45 g/day to goal of 150 g dextrose.  Additives: Standard vitamins/minerals per PharmD + 100 mg Thiamine x7 days    Goal PN provides 150 g dextrose, 60 g AA, and 250 mL 20% lipids 5 days per week for total provision of 1107 Kcals (27 Kcals/kg), 1.5 g/kg protein, GIR 2.58  "mg/kg/minute, and 32% fat kcals on average daily.        REASON FOR ASSESSMENT  Abigail Lam is a/an 68 year old female assessed by the dietitian for Admission Nutrition Risk Screen for 14-23 lb wt loss and decreased appetite and Provider Order - Poor PO intake, nausea, weight loss over 3 week period     CLINICAL HISTORY  PMH of ER positive breast cancer on extended therapy since about 2/2006 s/p textured breast implants, low bone density/osteoporosis, GERD with a very recent diagnosis of metastatic pancreatic cancer presented to ED with nausea, vomiting, constipation, intractable back pain and shortness of breath.  Found to have acute pulmonary embolism, several nonocclusive subsegmental pulmonary emboli.     NUTRITION HISTORY  - Attempted to visit w/ pt x2, but pt was not available. Information obtained from chart review.  - Per H&P: \"Endorses significant weight loss and decreased appetite. Hasn't been able to eat or drink since the past 3 weeks.\"  - \"She has made it clear that quality of life is important to her\"    GI: 1x BM since admission, hard/brown. Nausea noted.    CURRENT NUTRITION ORDERS  Diet: Regular  Intake/Tolerance: Has ordered 1 meal since admission    LABS  BUN 6.1 (L), Cr 0.52 (WNL)    MEDICATIONS  Biotene  Decadron  Colace  Pepcid  Omeprazole  Miralax  Fleet enema  Vitamin D3 50 mcg  PRN: Zofran    ANTHROPOMETRICS  Height: 156.2 cm (5' 1.5\")  Most Recent Weight: 40.4 kg (89 lb)    IBW: 50 kg  BMI: Underweight BMI <18.5  Weight History:   Wt Readings from Last 15 Encounters:   03/25/24 40.4 kg (89 lb)   03/22/24 38.8 kg (85 lb 9.6 oz)   03/20/24 39.5 kg (87 lb)   03/15/24 39.6 kg (87 lb 4.8 oz)   03/15/24 39.6 kg (87 lb 3.2 oz)   03/12/24 40.5 kg (89 lb 4.6 oz)   03/08/24 40.6 kg (89 lb 8 oz)   01/08/24 42.5 kg (95 lb 11.2 oz)   10/13/23 41.7 kg (92 lb)   04/07/23 43.4 kg (95 lb 11.2 oz)   12/16/22 42.2 kg (93 lb)   09/30/22 42.8 kg (94 lb 4.8 oz)   06/10/22 42.9 kg (94 lb 8 oz)   12/17/21 42.5 kg " (93 lb 9.6 oz)   06/25/21 42.8 kg (94 lb 6.4 oz)   12/20/19 42.3 kg (93 lb 4.8 oz)   5% wt loss in ~2 months.     Dosing Weight: 40.4 kg (actual wt)    ASSESSED NUTRITION NEEDS  Estimated Energy Needs: 1215 - 1415+ kcals/day (30 - 35+ kcals/kg )  Justification: Repletion and Underweight  Estimated Protein Needs: 48 - 61 grams protein/day (1.2 - 1.5 grams of pro/kg)  Justification: Repletion  Estimated Fluid Needs: 1 mL/kcal  Justification: Maintenance or Per provider pending fluid status    PHYSICAL FINDINGS  See malnutrition section below.     MALNUTRITION  % Intake: </= 50% for >/= 5 days (severe) -> anticipated based on H&P  % Weight Loss: Weight loss does not meet criteria  Subcutaneous Fat Loss: Unable to assess  Muscle Loss: Unable to assess  Fluid Accumulation/Edema: None noted  Malnutrition Diagnosis: Unable to determine due to unable to complete NFPE    NUTRITION DIAGNOSIS  Inadequate oral intake related to decreased appetite as evidenced by pt report per H&P, 5% wt loss in 2 months.      INTERVENTIONS  Implementation  Nutrition Education: Unable to complete due to unable to meet with pt today      Goals  Patient to consume % of nutritionally adequate meal trays TID, or the equivalent with supplements/snacks.     Monitoring/Evaluation  Progress toward goals will be monitored and evaluated per protocol.    YNES Verde, RD, LD  Available on [place]: M-F (7:00-3:30) 5A/7B Clinical Dietitian  Weekend/Holiday (7:00-3:30) - Weekend Clinical Dietitian      **Clinical Dietitians are no longer available by pager

## 2024-03-25 NOTE — CONSULTS
Discharge Pharmacy Test Claim    Patient's Federal BCBS plan covers Eliquis with a $99.73 monthly copay.    Test Claim Copay   Eliquis 99.73     Patient is eligible to use copay savings cards which reduces the copay of eliquis to $10/month. Visit the link below to print a copay card. Patient must call the phone number on the copay card or visit the 's website to activate. [place] pharmacy also has one-time use 30-day free trial vouchers available for eliquis.      Copay Card Link    eliquis  [email or web address]        Nicolle Aden  [facility] Liaison  Phone: [phone] Fax: [phone]  Available on Teams

## 2024-03-25 NOTE — PLAN OF CARE
Goal Outcome Evaluation:    HD 2: ED admit for progressive fatigue and shortness of breath and was found to have acute bilateral PE and started on heparin gtt. Recently diagnosed pancreatic adenocarcinoma metastatic to liver and peritoneum.     Hypertensive, but not within parameters to notify. Other VS stable on room. On tele. Heparin gtt infusing at 700 units/hr. Hep 10a within goal x2, recheck tomorrow morning. Lung sounds diminished. Pain somewhat managed with Oxycodone and Butrans patch. Regular diet but poor appetite. Received Colace, Miralax and Fleets enema. Patient had a large formed BM, followed by a medium loose BM. Patient reports much relief after the BMs. PIV x1, had 1 PIV infiltrate this morning and patient declined another one. , son and daughter at the bedside. Many consults today and patient expressed feeling tired and overwhelmed by all the information. Emotional support provided. Patient hopeful to discharge tomorrow. Plan to switch to oral anticoagulants tomorrow, continue with Heparin gtt for now.

## 2024-03-25 NOTE — H&P
[facility]    History and Physical - Medicine Service, [facility] TEAM        Date of Admission:  3/24/2024    Assessment & Plan   Abigail Lam is a 68-year-old female with past medical history of ER positive breast cancer on extended therapy since about 2/2006 s/p textured breast implants, low bone density/osteoporosis, GERD with a very recent diagnosis of metastatic pancreatic cancer presented to ED with nausea, vomiting, constipation, intractable back pain and shortness of breath.  Found to have acute pulmonary embolism, several nonocclusive subsegmental pulmonary emboli.    Acute pulmonary embolism  Several bilateral nonocclusive subsegmental pulmonary emboli  Chronic splenic vein thrombosis with collateralisation  New Splenic infarcts  Patient presented to the emergency department after recent short-trip to  for evaluation of dehydration, generalized fatigue and weakness likely secondary to her metastatic pancreatic cancer.  Endorses that she has been having chest discomfort and shortness of breath since last 3 weeks.  Denies palpitations.  Denies chest pain.  On workup, found to have leukocytosis, CT PE notable for acute pulmonary embolism, several subsegmental bilateral pulmonary emboli. Pt hemodynamically stable.  No tachycardia.  No tachypnea.  Breathing very well on room air.  She has been hypertensive to 160s over 70s.  EKG within normal limits.  Troponin elevated but downtrending. CT chest not notable for any right heart strain.  Pulmonary emboli might be secondary to hypercoagulability 2/2 metastatic malignancy and recent travel. PESI score 98. Hence Risk class III with 7% 30 day mortality risk.  Hence, inpatient management of PE is considered.  -Heparin GTT  -Echo pending  -Consider DOAC once the patient is more comfortable    Intractable pain in the epigastric region and spine  Nausea, vomiting  Constipation  Failure to thrive  Metastatic pancreatic cancer  with mets to liver, lymph nodes, peritoneum  Abigail was recently diagnosed with metastatic pancreatic cancer with mets to liver, lymph nodes and peritoneum presented with severe nausea, constipation with significant stool burden on CT, epigastric abdominal pain, acute back pain likely 2/2 metastatic cancer. Endorses significant weight loss and decreased appetite. Hasn't been able to eat or drink since the past 3 weeks.  She is yet to be seen by Dr. Rangel regarding cancer treatment options and strategies. She is very anxious and overwhelmed with the new diagnosis. Abigail and her family are still processing the news. She recently saw Dr. Schmidt (Palliative care team) for her symptom management. Would do everything to make her comfortable. Work up notable for mild leukocytosis, elevated ALP. UA notable for trace positive LE with hyaline casts. Lactate WNL. CXR with no consolidation. Low concern for infection, symptoms more concerning for acute stress 2/2 metastatic disease.   -Blood cx pending  -Urine Cx pending  -S/p IV fluids in the ED. Patient refused IV fluids overnight  -Pain control   -Continue PTA Dexamethasone 4 mg    -Continue PRN Tylenol 1000 mg q6h   -Continue PRN Oxycodone 5 mg q3h    -Continue PTA Butrans    -Continue PTA Robaxin  -Anxiety   - Continue PTA PRN Clonazepam 0.5 mg at bedtime   - Continue    -Constipation   - Continue PTA Mix 17 g daily   - Continue PTA fleet enema   - PRN Senna  -Nausea/Vomiting   -  PRN Compazine, Zofran  -Oncology and palliative teams consulted. Appreciate the recs.  -PT/OT consulted.    Hyperlipidemia  -Continue PTA Atorvastatin    GERD  -Continue PTA Famotidine at bedtime  -Continue PTA Omeprazole-sodium bicarb          Diet:Regular diet  DVT Prophylaxis: Heparin gtt  Terrell Catheter: Not present  Fluids:  IVF  Lines: None     Cardiac Monitoring: None  Code Status:  Full code     Clinically Significant Risk Factors Present on Admission                       # Cachexia:  "Estimated body mass index is 16.29 kg/m  as calculated from the following:    Height as of 3/22/24: 1.544 m (5' 0.79\").    Weight as of 3/22/24: 38.8 kg (85 lb 9.6 oz).              Disposition Plan      Expected Discharge Date: 03/26/2024                The patient's care was discussed with the Attending Physician, Dr. Tomas .      Ernst Tam MD  Medicine Service, [facility]  Securely message with [email or web address] (more info)  Text page via [facility] Paging/Directory   See signed in provider for up to date coverage information  ______________________________________________________________________    Chief Complaint   -Nausea, vomiting  -Constipation  -Failure to thrive  -bilateral subsegmental PE    History is obtained from the patient    History of Present Illness   Abigail Lam is a 68 year old female with a recent diagnosis of metastatic pancreatic cancer presented to the ED for the evaluation of generalized fatigue, weakness, nausea, vomiting, constipation started 3 weeks ago.  Patient was in [place] when she started noticing all the symptoms and she had to cut short her trip because of her symptoms.  Endorses 15 pounds weight loss in the last 3 weeks.  Denies fever, night sweats, chills, rigors, cough, chest pain, burning micturition.  Endorses epigastric upper abdominal pain and significant back pain started couple of weeks ago.  Mentions that she has seen palliative care team outpatient for the symptom management.  Patient also endorses significant exertional dyspnea and chest discomfort until couple of days ago, but now feeling better.  No melena, hemetemesis, hemoptysis or hematochezia.    In the ED, Pt received, IV Ativan 0.25 mg, IV Fluids, Zofran, Tylenol, Dulcolax suppository for symptom management    Past Medical History    No past medical history on file.    Past Surgical History   Past Surgical History:   Procedure Laterality Date    IR " LIVER BIOPSY PERCUTANEOUS  3/12/2024       Prior to Admission Medications   Prior to Admission Medications   Prescriptions Last Dose Informant Patient Reported? Taking?   Cholecalciferol (VITAMIN D) 2000 UNIT tablet   Yes No   Sig: Take 1 tablet by mouth daily.   Cyanocobalamin (VITAMIN B-12 IJ)   Yes No   Sig: Inject 1,000 mcg as directed Monthly   EPINEPHrine (ANY BX GENERIC EQUIV) 0.3 MG/0.3ML injection 2-pack   Yes No   Patient not taking: Reported on 3/15/2024   LORazepam (ATIVAN) 0.5 MG tablet   Yes No   Sig: Take 1 tablet by mouth as needed Sleep/anxiety   Lysine 500 MG CAPS   Yes No   Sig: Take 500 mg by mouth   Omeprazole-Sodium Bicarbonate  MG CAPS   Yes No   Pseudoephedrine HCl (SUDAFED PO)   Yes No   Sig: Take  by mouth as needed.   Psyllium (FIBER) 0.52 G CAPS   Yes No   Sig: Take 1 tablet by mouth daily   SODIUM FLUORIDE 5000 ENAMEL 1.1-5 % GEL   Yes No   Sig: USE TO BRUSH TEETH ONCE DAILY AT NIGHT TIME- DO NOT RINSE, DRINK OR EAT FOR 30 MINS AFTER USE   acetaminophen (TYLENOL) 500 MG tablet   Yes No   Sig: Take 1,000 mg by mouth   atorvastatin (LIPITOR) 10 MG tablet   No No   Sig: TAKE 1 TABLET BY MOUTH EVERY OTHER DAY   bethanechol (URECHOLINE) 25 MG tablet   Yes No   buprenorphine (BUTRANS) 5 MCG/HR WK patch   No No   Sig: Place 1 patch onto the skin every 7 days   clobetasol (TEMOVATE) 0.05 % ointment   Yes No   Sig: Apply topically daily as needed   clonazePAM (KLONOPIN) 0.5 MG tablet   No No   Sig: TAKE 1 TABLET BY MOUTH AT NIGHT AS NEEDED FOR ANXIETY   clotrimazole-betamethasone (LOTRISONE) 1-0.05 % external cream   Yes No   dexAMETHasone (DECADRON) 4 MG tablet   No No   Sig: Take 1 tablet (4 mg) by mouth daily (with breakfast)   docusate sodium (COLACE) 100 MG capsule   Yes No   Sig: Take 100 mg by mouth   estradiol (VAGIFEM) 10 MCG TABS vaginal tablet   Yes No   Si tablet every other day.   famotidine (PEPCID) 40 MG tablet   Yes No   Sig: Take 40 mg by mouth daily   fluconazole  (DIFLUCAN) 100 MG tablet   Yes No   Sig: Take 1 tablet by mouth daily   fluticasone (FLONASE) 50 MCG/ACT spray   Yes No   Sig: Spray 1 Inhaler in nostril   lidocaine (LIDODERM) 5 % Patch   Yes No   Sig: as needed   methocarbamol (ROBAXIN) 500 MG tablet   No No   Sig: Take 1 tablet (500 mg) by mouth 3 times daily   ondansetron (ZOFRAN ODT) 4 MG ODT tab   No No   Sig: Place 1 tablet (4 mg) under the tongue every 6 hours as needed for nausea   oxyCODONE (ROXICODONE) 5 MG tablet   No No   Sig: Take 1 tablet (5 mg) by mouth every 6 hours as needed for pain   tacrolimus (PROTOPIC) 0.1 % external ointment   No No   Sig: Apply topically 2 times daily 5-6 times weekly on lips and maybe 1-2 times weely on lips Mometasone      Facility-Administered Medications: None        Review of Systems    The 10 point Review of Systems is negative other than noted in the HPI or here.      Physical Exam   Vital Signs: Temp: 98  F (36.7  C)   BP: (!) 139/109 Pulse: 87   Resp: 13 SpO2: 95 % O2 Device: None (Room air)    Weight: 0 lbs 0 oz    Constitutional: awake, alert, cooperative, in apparent distress, and looks very uncomfortable.   Respiratory: No increased work of breathing, good air exchange, clear to auscultation bilaterally, no crackles or wheezing  Cardiovascular: Regular rate and rhythm, no murmur noted  GI: Soft , Normal bowel sounds, soft, non-distended  Neurologic: Awake, alert, oriented to name, place and time.  Neuropsychiatric: Anxious and normal eye contact.      Medical Decision Making       Please see A&P for additional details of medical decision making.      Data   ------------------------- PAST 24 HR DATA REVIEWED -----------------------------------------------    I have personally reviewed the following data over the past 24 hrs:    14.7 (H)  \   11.4 (L)   / 339     134 (L) 95 (L) 8.3 /  112 (H)   4.3 26 0.62 \     ALT: 29 AST: 79 (H) AP: 454 (H) TBILI: 0.8   ALB: 3.8 TOT PROTEIN: 6.6 LIPASE: N/A     Trop: 70 (H)  BNP: N/A     Procal: N/A CRP: N/A Lactic Acid: 1.6       INR:  N/A PTT:  N/A   D-dimer:  >20.00 (HH) Fibrinogen:  N/A       Imaging results reviewed over the past 24 hrs:   Recent Results (from the past 24 hour(s))   XR Chest 2 Views    Narrative    EXAM: XR CHEST 2 VIEWS  3/24/2024 5:20 PM      HISTORY: SOB    COMPARISON: Radiograph 3/8/2024. CT 3/8/2024.    FINDINGS: Two views of the chest. Trachea is midline. Normal  cardiomediastinal silhouette. No pleural effusion, consolidation or  pneumothorax. Soft tissue density overlying the mid hemithoraces  bilaterally on the PA view correlates with breast implants. No acute  osseous abnormalities.      Impression    IMPRESSION: No acute cardiopulmonary findings.    I have personally reviewed the examination and initial interpretation  and I agree with the findings.    MOE HORTON MD         SYSTEM ID:  [id number]   CT Chest Pulmonary Embolism w Contrast   Result Value    Radiologist flags Acute pulmonary embolus (Urgent)    Narrative    EXAMINATION: CTA pulmonary angiogram, 3/24/2024 7:52 PM     COMPARISON: CT 3/8/2024    HISTORY: Short of breath, elevated d-dimer    TECHNIQUE: Volumetric helical acquisition of CT images of the chest  from the lung apices to the kidneys were acquired after the  administration of 80 mL of Isovue-370 IV contrast.  Post-processed  multiplanar and/or MIP reformations were obtained, archived to PACS  and used in interpretation of this study.     FINDINGS:      Contrast bolus is: adequate.  Exam is positive for acute pulmonary  embolism. Several nonocclusive subsegmental pulmonary emboli in  branches supplying the right lower and left lower lobes. For example,  in the right lower lobe (series 9, image 145 and 178). In the left  lower lobe (series 9, image 150).      The largest right ventricle transaxial diameter is (measured from  endocardium to endocardium): 3.1 cm   The largest left ventricle transaxial diameter is (measured  from  endocardium to endocardium): 3.1 cm  RV/LV ratio is: Less than 1.1 (if ratio greater than 1.1 then sign is  suspicious for right heart strain)  Reflux of contrast into the IVC? no  Paradoxical bowing of the interventricular septum to the left? no  Pericardial effusion?: not present    Remainder of exam:    Devices: None.    Lower neck and axillae: No enlarged supraclavicular nodes are present.  No actionable nodule is present in the imaged portion of the thyroid  lobes. No axillary lymphadenopathy.    Heart: Normal heart size. No pericardial fluid or thickening.    Mediastinum and chantal: No mediastinal mass is present. No enlarged  lymph nodes are present.    Vessels: Normal caliber of the aorta and main pulmonary artery.  Aberrant right subclavian artery. No significant atherosclerotic  disease.    Airways: The central tracheobronchial tree is clear.    Lungs: The lungs are clear. No pleural effusion or pneumothorax    Upper abdomen: Please refer to separately dictated CT of the abdomen  for dedicated findings.    Bones: No acute or aggressive osseous abnormality.    Soft tissues: Bilateral breast implants.      Impression    IMPRESSION:   1. Exam is positive for acute pulmonary embolism. Subsegmental  pulmonary emboli involving the right lower and left lower lobes.      Evidence for right heart strain or increased right heart pressures?   is not present.     2. Please refer to separate dictated CT abdomen and pelvis for  dedicated findings.      [Urgent Result: Acute pulmonary embolus]    Finding was identified on 3/24/2024 8:46 PM.     Dr. Ayala was contacted by Dr. Maldonado at 3/24/2024 8:55 PM and  verbalized understanding of the urgent finding.     In the event of a positive result for acute pulmonary embolism  resulting in right heart strain, consider calling the   [facility] hospital  for PERT (Pulmonary Embolism Response Team)  Activation?    PERT -- Pulmonary Embolism Response Team  (Multidisciplinary team  including cardiology, interventional radiology, critical care,  hematology)    I have personally reviewed the examination and initial interpretation  and I agree with the findings.    MOE HORTON MD         SYSTEM ID:  [id number]   CT Abdomen Pelvis w Contrast    Narrative    EXAMINATION: CT ABDOMEN PELVIS W CONTRAST, 3/24/2024 7:53 PM    INDICATION: abd pain. History of breast cancer with recent suspicion  of primary pancreatic neoplasm with metastatic disease on PET/CT.    COMPARISON STUDY: PET CT 3/15/2024.    TECHNIQUE: CT scan of the abdomen and pelvis was performed on  multidetector CT scanner using volumetric acquisition technique and  images were reconstructed in multiple planes with variable thickness  and reviewed on dedicated workstations.     CONTRAST: 53 mL Isovue-370 injected IV without oral contrast    CT scan radiation dose is optimized to minimum requisite dose using  automated dose modulation techniques.    FINDINGS:    Lower thorax: Please see separate CT pulmonary embolus study.    Liver: Extensive metastatic disease throughout the liver present as  described on recent PET/CT without significant change given short  interval between the studies. Mild periportal edema. Portal vein  patent.    Biliary System: Normal gallbladder. No extrahepatic biliary ductal  dilation.    Pancreas: The lesion in the pancreatic tail described on PET/CT is  again noted. This is heterogeneously hypoattenuating and measures  approximately 3.1 x 2.1 cm, series 5 image 56. Remainder of the gland  is atrophic. No dilatation of the pancreatic duct. The mass abuts the  posterior gastric wall. The main splenic vein has thrombosed with  collateralization. There is soft tissue density along the celiac axis  and to a lesser degree SMA which may represent tumor infiltration  given extensive metastatic disease or may be edema. This is not a  dedicated staging study therefore no arterial phase  available.    Adrenal glands: Mild thickening of the left adrenal gland. No right  adrenal nodules.    Spleen: Spleen is not enlarged. There are however wedge-shaped defects  consistent with infarcts which are new from prior PET/CT. These are  noted in the anterior mid spleen, series 5 image 39 and inferior  spleen series 5 image 56. Trace perisplenic fluid. The splenic vein as  discussed above has been thrombosed with collateralization in the left  upper quadrant.    Kidneys: Large cyst in the lower pole of the left kidney appears  simple measuring 5.7 cm. Right extrarenal pelvis. Mild left  pelvocaliectasis noted, increased from PET/CT. The left ureter however  is difficult to follow but does not appear significantly distended.  Small cyst in the mid right kidney.  No renal calculi.    Gastrointestinal tract: Normal appendix. Bowel wall thickening  involving small and large bowel demonstrated. This is most marked  within the colonic region from ascending through transverse. There is  also some mild wall thickening of the descending and possibly  rectosigmoid colon.    Mesentery/peritoneum/retroperitoneum: Small volume of ascites in the  abdomen and pelvis has increased from PET/CT. Extensive peritoneal  nodularity throughout the mesentery and omentum present consistent  with peritoneal carcinomatosis. This appears to have worsened compared  to recent study. No free air.    Lymph nodes: No definite enlarged retroperitoneal lymph nodes.  Scattered small mesenteric nodes versus metastatic deposits. Small  mildly enlarged portal nodes. On coronal imaging there also appears to  be some mildly enlarged gastrohepatic ligament nodes.    Vasculature: Midline vessels are normal in caliber with  atherosclerosis of the aorta. As above there is attenuation/chronic  thrombosis of the splenic vein secondary to the pancreatic lesion with  collateralization and varices.    Pelvis: Urinary bladder is normal.   Hysterectomy.    Osseous structures: The lesion in the right para-midline sacrum seen  on PET/CT is difficult to visualize on CT. Degenerative changes.      Soft tissues: Partially visualized breast implants. See separate CT  chest. Mild body wall edema.      Impression    IMPRESSION:   1.  As described on recent PET/CT, there is a mass in the pancreatic  tail abutting the posterior gastric wall with numerous metastatic  lesions to the liver, overall similar in appearance to recent study.  2.  Findings consistent with peritoneal carcinomatosis also present as  described on PET/CT with increase in size and distribution of the  nodules and increasing volume of ascites.  3.  Wall thickening involving small and large bowel loops may be due  to serosal peritoneal metastatic disease or may be reactive with  overall third spacing of fluid. Correlation with any GI symptoms for  enterocolitis.  4.  Splenic infarcts are demonstrated, new from PET/CT. Chronic  attenuation/thrombosis of the splenic vein with collateralization.  5.  A few enlarged portal nodes may be reactive versus metastatic.  There are also some gastrohepatic ligament nodes suspected. Small  mesenteric nodes versus peritoneal deposits.  6.  No definite CT correlate identified for osseous metastatic lesion  in the sacrum PET/CT.  7.  Please see separate CT chest.    MOE HORTON MD         SYSTEM ID:  [id number]

## 2024-03-25 NOTE — PROGRESS NOTES
Resident/Fellow Attestation   I, Brandie Spears MD, was present with the medical/ANDREI student who participated in the service and in the documentation of the note.  I have verified the history and personally performed the physical exam and medical decision making.  I agree with the assessment and plan of care as documented in the note.      Brandie Spears MD  PGY3  Date of Service (when I saw the patient): 03/25/24    [facility]    Progress Note - Medicine Service, MAROON TEAM 4       Date of Admission:  3/24/2024    Assessment & Plan   Abigail Lam is a 68 year old female with PMHx of ER+ breast cancer (2/2006) s/p bilateral mastectomy and extended chemotherapy, low bone density/osteoporosis, GERD, and very recent diagnosis of metastatic pancreatic cancer (3/8/2024) who was admitted on 3/24/2024 for nausea, vomiting, constipation, intractable back pain, and shortness of breath of 3 week duration. CT (3/24/24) findings c/w bilateral pulmonary embolisms, extensive pancreatic disease, and splenic infarcts.     Today  - Discussed case with GI; no obstructions observed on imaging; pt also on anti-coagulation, so any interventions would be contraindicated  - Nutrition consulted for failure to thrive presentation  - Oncology consulted for coordination of outpatient care  - Palliative consulted for intractable nausea, pain, and GOC conversations  - Continue LR mIVF 100 ml/hr  - s/p 1x fleet enema  - Ordered biotene artificial saliva spray QID  - Echo (3/25/24) with normal left and right ventricular function, no right heart strain  - Continue heparin gtt > transition to DOAC tomorrow if remains HDS  - Pharmacy liaison consult for DOAC coverage    #Acute pulmonary embolism  #Several bilateral nonocclusive subsegmental pulmonary emboli  #Chronic splenic vein thrombosis with collateralisation  #New splenic infarcts  Patient presented to the emergency department after  recent short-trip to  for evaluation of dehydration, generalized fatigue and weakness likely secondary to her metastatic pancreatic cancer. Endorses that she has been having chest discomfort and shortness of breath since last 3 weeks. CT PE notable for acute pulmonary embolism, several subsegmental bilateral pulmonary emboli, and new splenic infarcts. Pt hemodynamically stable. Breathing very well on room air. EKG within normal limits. Troponin elevated but downtrending. CT chest not notable for any right heart strain. Pulmonary emboli likely secondary to hypercoagulability 2/2 metastatic malignancy and recent travel. PESI score intermediate risk. Echo (3/25/24) with normal left and right ventricular function, no right heart strain. If remains HDS overnight will transition to DOAC tomorrow.  - Heparin gtt   > Transition to DOAC tomorrow if patient remains HDS  - Pharmacy liaison consult for DOAC coverage     #Metastatic pancreatic cancer with mets to liver, lymph nodes, peritoneum  #Intractable pain in the epigastric region and spine  #Nausea w/o vomiting  #Constipation  #Anorexia  Recently diagnosed with metastatic pancreatic cancer with mets to liver, lymph nodes and peritoneum presented with severe nausea, constipation with significant stool burden on CT, epigastric abdominal pain, acute back pain likely 2/2 metastatic cancer. Endorses significant weight loss and decreased appetite. Hasn't been able to eat or drink since the past 3 weeks.  She is yet to be seen by Dr. Rangel regarding cancer treatment options and strategies. She is very anxious and overwhelmed with the new diagnosis. Abigail and her family are still processing the news. She recently saw Dr. Schmidt (Palliative care team) for her symptom management. Work up notable for mild leukocytosis, elevated ALP. UA notable for trace positive LE with hyaline casts. Lactate WNL. CXR with no consolidation. Low concern for infection, symptoms more concerning for  acute stress 2/2 metastatic disease. Discussed imaging with Panc-Bili team- pancreatic mass dose not appear to be causing gastric outlet obstruction (just abutting the wall) to explain patient's symptoms, therefore, there would be likely no benefit with stenting. Furthermore, if stenting was an option she would need to have anticoagulation safely held in order to get procedure. Celiac plexus neurolysis is an option for pain relief, however, this would also need to be done with anticoagulation held ponce-procedurally. Given acute PE and ability to titrate medications for pain/nausea relief will defer to outpatient for now.  - Palliative care, Oncology, & Nutrition consulted  - Blood and urine cultures pending  - Continue LR mIVF 100 ml/hr given poor PO intake  - Pain control:              - Start PTA Dexamethasone 4 mg (prescribed outpatient, but pt had not started taking yet)              - Continue PRN Tylenol 1000 mg q6h              - Continue PRN Oxycodone 5 mg q3h               - Continue PTA Butrans               - Continue PTA Robaxin  -Anxiety:              - Continue PTA PRN Clonazepam 0.5 mg at bedtime  -Constipation:              - S/p 1x fleet enema              - Continue PTA Miralax daily              - PRN Senna  -Nausea:              - PRN Compazine, Zofran   - Start PTA decadron 4 mg as per above   - Biotene artificial saliva spray QID for dry mouth  - Oncology consulted   - Discussed with pt and family the diagnosis, prognosis, and treatment options   - Radiation may be option for palliation in areas causing pain or symptoms   - Chemotherapy can also be an option for palliative intent, and my prolong life by weeks-months   - Without chemotherapy, overall survival is on the matter of months   - Plan to follow-up with outpatient oncologist as scheduled    #Hyperlipidemia  - Continue PTA Atorvastatin     #GERD  - Continue PTA Famotidine at bedtime  - Continue PTA Omeprazole-sodium bicarb       Diet:  "Combination Diet Regular Diet Adult    DVT Prophylaxis: Heparin gtt  Terrell Catheter: Not present  Fluids:  ml/hr mIVF  Lines: None     Cardiac Monitoring: ACTIVE order. Indication: PE  Code Status: Full Code      Clinically Significant Risk Factors Present on Admission          # Hypocalcemia: Lowest Ca = 8.4 mg/dL in last 2 days, will monitor and replace as appropriate      # Coagulation Defect: INR = 1.45 (Ref range: 0.85 - 1.15) and/or PTT = 104 Seconds (Ref range: 22 - 38 Seconds), will monitor for bleeding         # Cachexia: Estimated body mass index is 16.54 kg/m  as calculated from the following:    Height as of this encounter: 1.562 m (5' 1.5\").    Weight as of this encounter: 40.4 kg (89 lb).              Disposition Plan      Expected Discharge Date: 04/01/2024        Discharge Comments: may start chemo        The patient's care was discussed with the Attending Physician, Dr. Espinoza .    Nighat Alaniz  Medical Student  Medicine Service, [facility] 4  [facility]  Securely message with mYwindow (more info)  Text page via [facility] Paging/Directory   See signed in provider for up to date coverage information  ______________________________________________________________________    Interval History   NAEO. Pt was really tired last night, so did not want mIVF overnight. This AM, she is feeling tired and anxious. Does feel somewhat dehydrated and poor appetite. Main symptoms are nausea and pain. Today wants to focus on rehydration via IV fluids and symptomatic management.     Physical Exam   Vital Signs: Temp: 97.8  F (36.6  C) Temp src: Oral BP: (!) 155/62 Pulse: 92   Resp: 16 SpO2: 97 % O2 Device: None (Room air)    Weight: 89 lbs 0 oz    Constitutional: awake, alert, anxious, no apparent distress, and appears stated age  Respiratory: No increased work of breathing, good air exchange, clear to auscultation bilaterally, no crackles or wheezing  Cardiovascular: " Regular rate and rhythm, normal S1 and S2, and no murmur noted  GI: No scars, normal bowel sounds, soft, non-distended, non-tender    Medical Decision Making       Please see A&P for additional details of medical decision making.      Data     I have personally reviewed the following data over the past 24 hrs:    18.5 (H)  \   12.0   / 309     136 101 6.1 (L) /  87   3.6 22 0.52 \     ALT: 23 AST: 65 (H) AP: 384 (H) TBILI: 0.8   ALB: 3.5 TOT PROTEIN: 5.9 (L) LIPASE: N/A     Trop: 70 (H) BNP: N/A     Procal: N/A CRP: N/A Lactic Acid: 1.6       INR:  1.45 (H) PTT:  104 (H)   D-dimer:  >20.00 (HH) Fibrinogen:  N/A       Imaging results reviewed over the past 24 hrs:   Recent Results (from the past 24 hour(s))   XR Chest 2 Views    Narrative    EXAM: XR CHEST 2 VIEWS  3/24/2024 5:20 PM      HISTORY: SOB    COMPARISON: Radiograph 3/8/2024. CT 3/8/2024.    FINDINGS: Two views of the chest. Trachea is midline. Normal  cardiomediastinal silhouette. No pleural effusion, consolidation or  pneumothorax. Soft tissue density overlying the mid hemithoraces  bilaterally on the PA view correlates with breast implants. No acute  osseous abnormalities.      Impression    IMPRESSION: No acute cardiopulmonary findings.    I have personally reviewed the examination and initial interpretation  and I agree with the findings.    MOE HORTON MD         SYSTEM ID:  [id number]   CT Chest Pulmonary Embolism w Contrast   Result Value    Radiologist flags Acute pulmonary embolus (Urgent)    Narrative    EXAMINATION: CTA pulmonary angiogram, 3/24/2024 7:52 PM     COMPARISON: CT 3/8/2024    HISTORY: Short of breath, elevated d-dimer    TECHNIQUE: Volumetric helical acquisition of CT images of the chest  from the lung apices to the kidneys were acquired after the  administration of 80 mL of Isovue-370 IV contrast.  Post-processed  multiplanar and/or MIP reformations were obtained, archived to PACS  and used in interpretation of this study.      FINDINGS:      Contrast bolus is: adequate.  Exam is positive for acute pulmonary  embolism. Several nonocclusive subsegmental pulmonary emboli in  branches supplying the right lower and left lower lobes. For example,  in the right lower lobe (series 9, image 145 and 178). In the left  lower lobe (series 9, image 150).      The largest right ventricle transaxial diameter is (measured from  endocardium to endocardium): 3.1 cm   The largest left ventricle transaxial diameter is (measured from  endocardium to endocardium): 3.1 cm  RV/LV ratio is: Less than 1.1 (if ratio greater than 1.1 then sign is  suspicious for right heart strain)  Reflux of contrast into the IVC? no  Paradoxical bowing of the interventricular septum to the left? no  Pericardial effusion?: not present    Remainder of exam:    Devices: None.    Lower neck and axillae: No enlarged supraclavicular nodes are present.  No actionable nodule is present in the imaged portion of the thyroid  lobes. No axillary lymphadenopathy.    Heart: Normal heart size. No pericardial fluid or thickening.    Mediastinum and chantal: No mediastinal mass is present. No enlarged  lymph nodes are present.    Vessels: Normal caliber of the aorta and main pulmonary artery.  Aberrant right subclavian artery. No significant atherosclerotic  disease.    Airways: The central tracheobronchial tree is clear.    Lungs: The lungs are clear. No pleural effusion or pneumothorax    Upper abdomen: Please refer to separately dictated CT of the abdomen  for dedicated findings.    Bones: No acute or aggressive osseous abnormality.    Soft tissues: Bilateral breast implants.      Impression    IMPRESSION:   1. Exam is positive for acute pulmonary embolism. Subsegmental  pulmonary emboli involving the right lower and left lower lobes.      Evidence for right heart strain or increased right heart pressures?   is not present.     2. Please refer to separate dictated CT abdomen and pelvis  for  dedicated findings.      [Urgent Result: Acute pulmonary embolus]    Finding was identified on 3/24/2024 8:46 PM.     Dr. Ayala was contacted by Dr. Maldonado at 3/24/2024 8:55 PM and  verbalized understanding of the urgent finding.     In the event of a positive result for acute pulmonary embolism  resulting in right heart strain, consider calling the   [facility] hospital  for PERT (Pulmonary Embolism Response Team)  Activation?    PERT -- Pulmonary Embolism Response Team (Multidisciplinary team  including cardiology, interventional radiology, critical care,  hematology)    I have personally reviewed the examination and initial interpretation  and I agree with the findings.    MOE HORTON MD         SYSTEM ID:  [id number]   CT Abdomen Pelvis w Contrast    Narrative    EXAMINATION: CT ABDOMEN PELVIS W CONTRAST, 3/24/2024 7:53 PM    INDICATION: abd pain. History of breast cancer with recent suspicion  of primary pancreatic neoplasm with metastatic disease on PET/CT.    COMPARISON STUDY: PET CT 3/15/2024.    TECHNIQUE: CT scan of the abdomen and pelvis was performed on  multidetector CT scanner using volumetric acquisition technique and  images were reconstructed in multiple planes with variable thickness  and reviewed on dedicated workstations.     CONTRAST: 53 mL Isovue-370 injected IV without oral contrast    CT scan radiation dose is optimized to minimum requisite dose using  automated dose modulation techniques.    FINDINGS:    Lower thorax: Please see separate CT pulmonary embolus study.    Liver: Extensive metastatic disease throughout the liver present as  described on recent PET/CT without significant change given short  interval between the studies. Mild periportal edema. Portal vein  patent.    Biliary System: Normal gallbladder. No extrahepatic biliary ductal  dilation.    Pancreas: The lesion in the pancreatic tail described on PET/CT is  again noted. This is heterogeneously hypoattenuating  and measures  approximately 3.1 x 2.1 cm, series 5 image 56. Remainder of the gland  is atrophic. No dilatation of the pancreatic duct. The mass abuts the  posterior gastric wall. The main splenic vein has thrombosed with  collateralization. There is soft tissue density along the celiac axis  and to a lesser degree SMA which may represent tumor infiltration  given extensive metastatic disease or may be edema. This is not a  dedicated staging study therefore no arterial phase available.    Adrenal glands: Mild thickening of the left adrenal gland. No right  adrenal nodules.    Spleen: Spleen is not enlarged. There are however wedge-shaped defects  consistent with infarcts which are new from prior PET/CT. These are  noted in the anterior mid spleen, series 5 image 39 and inferior  spleen series 5 image 56. Trace perisplenic fluid. The splenic vein as  discussed above has been thrombosed with collateralization in the left  upper quadrant.    Kidneys: Large cyst in the lower pole of the left kidney appears  simple measuring 5.7 cm. Right extrarenal pelvis. Mild left  pelvocaliectasis noted, increased from PET/CT. The left ureter however  is difficult to follow but does not appear significantly distended.  Small cyst in the mid right kidney.  No renal calculi.    Gastrointestinal tract: Normal appendix. Bowel wall thickening  involving small and large bowel demonstrated. This is most marked  within the colonic region from ascending through transverse. There is  also some mild wall thickening of the descending and possibly  rectosigmoid colon.    Mesentery/peritoneum/retroperitoneum: Small volume of ascites in the  abdomen and pelvis has increased from PET/CT. Extensive peritoneal  nodularity throughout the mesentery and omentum present consistent  with peritoneal carcinomatosis. This appears to have worsened compared  to recent study. No free air.    Lymph nodes: No definite enlarged retroperitoneal lymph  nodes.  Scattered small mesenteric nodes versus metastatic deposits. Small  mildly enlarged portal nodes. On coronal imaging there also appears to  be some mildly enlarged gastrohepatic ligament nodes.    Vasculature: Midline vessels are normal in caliber with  atherosclerosis of the aorta. As above there is attenuation/chronic  thrombosis of the splenic vein secondary to the pancreatic lesion with  collateralization and varices.    Pelvis: Urinary bladder is normal.  Hysterectomy.    Osseous structures: The lesion in the right para-midline sacrum seen  on PET/CT is difficult to visualize on CT. Degenerative changes.      Soft tissues: Partially visualized breast implants. See separate CT  chest. Mild body wall edema.      Impression    IMPRESSION:   1.  As described on recent PET/CT, there is a mass in the pancreatic  tail abutting the posterior gastric wall with numerous metastatic  lesions to the liver, overall similar in appearance to recent study.  2.  Findings consistent with peritoneal carcinomatosis also present as  described on PET/CT with increase in size and distribution of the  nodules and increasing volume of ascites.  3.  Wall thickening involving small and large bowel loops may be due  to serosal peritoneal metastatic disease or may be reactive with  overall third spacing of fluid. Correlation with any GI symptoms for  enterocolitis.  4.  Splenic infarcts are demonstrated, new from PET/CT. Chronic  attenuation/thrombosis of the splenic vein with collateralization.  5.  A few enlarged portal nodes may be reactive versus metastatic.  There are also some gastrohepatic ligament nodes suspected. Small  mesenteric nodes versus peritoneal deposits.  6.  No definite CT correlate identified for osseous metastatic lesion  in the sacrum PET/CT.  7.  Please see separate CT chest.    MOE HORTON MD         SYSTEM ID:  [id number]   Echo Complete   Result Value    LVEF  60-65%    Narrative     [id number]  [id number]  [id number]  [id number]^RHODA^YESENIA     [facility]  Echocardiography Laboratory  [address] [place] [id number]     Name: ERICA FERNANDES  MRN: [id number]  : 1955  Study Date: 2024 07:16 AM  Age: 68 yrs  Gender: Female  Patient Location: [facility]  Reason For Study: Pulmonary Emboli  Ordering Physician: YESENIA DUONG  Performed By: Yary Hoyt RDCS     BSA: 1.3 m2  Height: 61 in  Weight: 88 lb  ______________________________________________________________________________  Procedure  Complete Portable Echo Adult. Contrast Optison. Technically difficult  study.Extremely poor acoustic windows. Optison (NDC #4890-7197-71) given  intravenously. Patient was given 6 ml mixture of 3 ml Optison and 6 ml saline.  3 ml wasted.  ______________________________________________________________________________  Interpretation Summary  Technically difficult study.Extremely poor acoustic windows.  Global and regional left ventricular function is normal with an EF of 60-65%.  Right ventricular function, chamber size, wall motion, and thickness are  normal.  Pulmonary artery systolic pressure cannot be assessed.  The inferior vena cava is normal.  No pericardial effusion is present.  ______________________________________________________________________________  Left Ventricle  Global and regional left ventricular function is normal with an EF of 60-65%.  Left ventricular wall thickness is normal. Left ventricular size is normal.  Left ventricular diastolic function is normal. No regional wall motion  abnormalities are seen.     Right Ventricle  Right ventricular function, chamber size, wall motion, and thickness are  normal.     Atria  Both atria appear normal.     Mitral Valve  The mitral valve is normal.     Aortic Valve  The valve leaflets are not well visualized. On Doppler interrogation, there is  no significant stenosis or regurgitation.     Tricuspid  Valve  The valve leaflets are not well visualized. On Doppler interrogation, there is  no significant stenosis or regurgitation. Pulmonary artery systolic pressure  cannot be assessed.     Pulmonic Valve  The pulmonic valve cannot be assessed.     Vessels  The pulmonary artery cannot be assessed. The inferior vena cava is normal.  Dilated aortic root and ascending aorta for BSA of 1.3m2.     Pericardium  No pericardial effusion is present.  ______________________________________________________________________________  MMode/2D Measurements & Calculations  IVSd: 0.64 cm     LVIDd: 3.7 cm  LVIDs: 2.6 cm  LVPWd: 0.67 cm  FS: 30.2 %  LV mass(C)d: 63.4 grams  LV mass(C)dI: 47.6 grams/m2  Ao root diam: 2.9 cm  asc Aorta Diam: 2.9 cm  LVOT diam: 2.0 cm  LVOT area: 3.1 cm2  Ao root diam index Ht(cm/m): 1.9  Ao root diam index BSA (cm/m2): 2.2  Asc Ao diam index BSA (cm/m2): 2.2  Asc Ao diam index Ht(cm/m): 1.9  RWT: 0.36     Doppler Measurements & Calculations  MV E max андрей: 50.0 cm/sec  MV A max андрей: 75.8 cm/sec  MV E/A: 0.66  MV dec time: 0.21 sec  E/E' av.8     Lateral E/e': 3.9  Medial E/e': 5.7     ______________________________________________________________________________  Report approved by: Fatimah Jack 2024 09:27 AM

## 2024-03-25 NOTE — CONSULTS
SPIRITUAL HEALTH SERVICES Consult Note  [facility] ([place] 5A    I visited Abigail per routine consult.  Abigail's family was at bedside.  Abigail denied any needs at this time stating she is very close to her rabbi.  I let her know how she could request spiritual care in the future if her needs change.    Amaya Gonzalez  Chaplain Resident  Pager [phone]    * [facility] remains available 24/7 for emergent requests/referrals, either by having the switchboard page the on-call  or by entering an ASAP/STAT consult in Epic (this will also page the on-call ). Routine Epic consults receive an initial response within 24 hours.*

## 2024-03-25 NOTE — ED PROVIDER NOTES
I took signout on the patient from Dr. Juárez.  This is a 68F with chest pain, recent dx of metastatic pancreatic cancer. Awaiting CT results. Plan for admission.      9:21 PM received call from radiology that CT of the chest was positive for bilateral pulmonary embolisms.  Patient was seen by the medicine team and they stated they did not wish to be admitted.  Results of CT imaging were not available to patient and family.  I sat at the bedside and had an extensive discussion with patient and family about the findings of bilateral pulmonary embolism, the elevated troponin level, the patient's cardiac symptoms and her recent cancer history.  At the request of the patient I did discuss the findings with a family friend who is a cardiologist.  After discussing the concerns and recommendations he conversed privately with the patient and the family.  Upon reassessment patient and family state that they are in agreement with plan for admission.    CT imaging unfortunately shows extensive pancreatic disease.  There are also findings of splenic infarcts.  Her embolic and thrombotic disease is likely related to hypercoagulability in the setting of cancer.  After conferring with the internal medicine service we are starting heparin, maintenance fluids.  At the request of patient and family I provided 0.5 mg of IV Ativan for some anxiolysis.    Internal medicine team was present and discussing plan with the patient.  She would very much like to see Dr. Foy of oncology if possible.    Stable for admission.         Gustabo Arzate MD  03/24/24 [id number]

## 2024-03-26 NOTE — PROGRESS NOTES
PALLIATIVE CARE PROGRESS NOTE  [facility]     Patient Name: Abigail Lam  Date of Admission: 3/24/2024   Today the patient was seen for: symptoms, GOC     Recommendations & Counseling       GOALS OF CARE:   Patient wants to prioritize quality of life over quantity.   Has not yet decided if she wants to pursue chemotherapy.   Hopeful to get more answers once outpatient, follow-ups scheduled with oncology and palliative care.   Planning to seek second opinion outside of state as well.   Interested in setting up IVF in the outpatient setting as these have helped her symptoms. Likely would need some type of IV access for this as she is a very hard stick per IV team here. Discussed port vs PICC today. May be beneficial to determine goals first (if wanting to pursue chemo vs not), as that may change recommendation.     ADVANCE CARE PLANNING:  No health care directive on file. Per  informed consent policy, next of kin should be involved if patient becomes unable.  There is no POLST form on file, defer to patient and/or next of kin for decisions   Code status: Full Code    MEDICAL MANAGEMENT:   #Pain  Epigastric and bilat spine at ~T12 level. Metastatic pancreatic cancer with mets to liver, lymph nodes and peritoneum.   Tylenol 1g q8h - pt has been refusing  Butrans patch 5mgc/hr/week - has not noticed this has helped pain, wants to defer changing today and discuss at outpatient visit tomorrow.   Oxycodone 5mg q3h prn - using 1-2 tablets AM and bedtime  Robaxin TID - has not used during hospitalization.   Decadron 4mg daily - started 3/25  Lidocaine patch daily prn   Physical Therapy  Consider celiac axis plexus block - discussed with patient today and would recommend considering this in the near future - primary team did talk to GI who stated this could be an option, anticoagulation would need to be held.      #Anxiety, Generalized Anxiety  Follows with outpatient psychiatry,  has visit planned in upcoming weeks.   PTA Clonazepam 0.5mg bedtime (per home regimen)  Offered hydroxyzine as prn throughout day, but pt declined due to previously caused dry mouth.   Patient would like to discuss any addition of psychiatric medications with her psychiatrist outpatient. Plans to see them soon. May benefit from selective serotonin reuptake inhibitor or buspar.      #Nausea  #Decreased appetite  Long-standing hx of chronic nausea, dyspepsia, and heartburn. Is on PPI and H2B daily long term and zofran. Oxycodone worsened her nausea.   -Pharmacologic management   Ondansetron (Zofran) 4mg q6h prn - states this works, not as well as previously.   Prochlorperazine (COMPAZINE) 5mg q6h prn - has not used inpatient   Ativan 0.5mg prn - notes this has been helpful   Considered adding olanzapine 2.5mg bedtime daily (could increase to 5mg daily as tolerated) for nausea, appetite, anxiety. Patient wants to talk to psychiatry about this.   Decadron 4mg daily  Pepsid 40mg daily  Protonix 40mg daily + sodium bicarb    -Nonpharmacologic management  Small, frequent intake  Assess and avoid aggravating factors  Accupressure (C-band)  Aromatherapy, alcohol swabs known to be effective     #Constipation, chronic/OIC  Recommended having a regimen of Sennoside (Senokot), Polyethylene glycol (MiraLAX) daily - titrating to have 1 BM every 1-3 days. Using fleets enema as needed if that is not working.     # Dry Mouth  Medication related vs poor PO intake/dehydration.  Frequent sips of water, ice chips  Sugarless gums and candies can stimulate salivary reflexes.    Avoid medications that could cause worsening of dry mouth.     Palliative Care will continue to follow. Thank you for the consult and allowing us to aid in the care of Abigail Lam.    These recommendations have been discussed with medicine team.    DONNA Jimenez CNP  Securely message with [employer] (more info)  Text page via [employer] Paging/Directory           Interval History:     Multidisciplinary collaboration:  Plans to discharge today with close follow-up outpatient    Patient/family narrative  Patient reports she has been having chest tightness/heaviness all morning. They did an EKG, unsure what next steps are. Offered GI cocktail to see if that would help, patient declines and feels it does not feel like reflux to her.     Nausea has been tolerable, feeling much better overall after received 1L LR today. Pain manageable with oxycodone 1-2 tabs AM and again at bedtime. She does not feel the bup patch has made any difference. Offered to increase dose of this or try something else, she defers today. We also discussed some additional medications we could consider for nausea, such as zyprexa. She states she would like to talk to her psychiatrist about any psych medications first. Zofran works somewhat, ativan also works well.      Palliative Summary/HPI          Abigail Lam is a 68-year-old female with past medical history of ER positive breast cancer on extended therapy since about 2/2006 s/p textured breast implants, low bone density/osteoporosis, GERD with a very recent diagnosis of metastatic pancreatic cancer presented to ED 3/24 with nausea, vomiting, constipation, intractable back pain and shortness of breath.  Found to have acute pulmonary embolism, several nonocclusive subsegmental pulmonary emboli.      Today, the patient was seen for:  Abdominal pain, generalized  Malignant neoplasm of body of pancreas  Nausea related to cancer  Dehydration  Goals of Care         Medications:      I have reviewed this patient's medication profile and medications during this hospitalization. Pertinent medications today include:       APAP 1g q8h - pt refusing  Butrans 5mcg  Decadron 4mg daily  Colace daily  Pepsid 40mg daily   Robaxin - refused  Zofran 4mg q6h prn - x3  Oxycodone 5mg prn x3  Miralax daily   Compazine prn - not used  Senna prn - not used  Fleet enema x1  yesterday     LR bolus this AM          Physical Exam:   Temp:  [97.8  F (36.6  C)-98.1  F (36.7  C)] 97.8  F (36.6  C)  Pulse:  [85-92] 85  Resp:  [16-18] 18  BP: (148-164)/(62-82) 156/70  SpO2:  [97 %-100 %] 99 %  87 lbs 0 oz    Physical Exam  General: NAD, lying in bed. Appears comfortable.   Pulmonary: Unlabored. Speaking in full sentences.  Neuro: Speech fluent. Alert and oriented x4.  Mental status/Psych: Asks/answers questions appropriately. Affect normal.             Current Problem List:   Principal Problem:    Malignant neoplasm of pancreas, unspecified location of malignancy (H)  Active Problems:    Shortness of breath    Dehydration    Weakness    Abdominal pain, unspecified abdominal location    Chest pain, unspecified type      Allergies   Allergen Reactions    Bee Venom      Other reaction(s): Edema  Swelling    Diphtheria Toxoid-Tetanus Tox-Thimerosal      Other reaction(s): Edema    Ketorolac      Other reaction(s): Hypertension    Ketorolac Tromethamine     Ketorolac Tromethamine      Other reaction(s): Other (See Comments)  Creates high blood pressure    Nickel Other (See Comments)     Per patch test      Nitrofurantoin      Other reaction(s): Edema  Lower lip swelling.    Propolis      Other reaction(s): Edema  Swelling    Scopolamine Hives            Data Reviewed:     Recent Labs   Lab 03/26/24  0649 03/25/24  0904 03/24/24  2309 03/24/24  1629   WBC 20.9* 18.5* 14.7* 17.0*   HGB 12.8 12.0 11.4* 12.4   MCV 92 95 94 92   * 309 339 381   INR  --  1.45*  --   --    * 136  --  134*   POTASSIUM 4.3 3.6  --  4.3   CHLORIDE 99 101  --  95*   CO2 23 22  --  26   BUN 7.8* 6.1*  --  8.3   CR 0.54 0.52  --  0.62   ANIONGAP 12 13  --  13   DENA 9.0 8.4*  --  9.0   * 87  --  112*   ALBUMIN 3.7 3.5  --  3.8   PROTTOTAL 6.5 5.9*  --  6.6   BILITOTAL 0.7 0.8  --  0.8   ALKPHOS 425* 384*  --  454*   ALT 23 23  --  29   AST 61* 65*  --  79*         Medical Decision Making       MANAGEMENT  DISCUSSED with the following over the past 24 hours: IM   NOTE(S)/MEDICAL RECORDS REVIEWED over the past 24 hours: nursing notes, IM, dietitian, oncology  Tests REVIEWED in the past 24 hours:  - See lab/imaging results included in the data section of the note  - EKG  Medical complexity over the past 24 hours:  - Prescription DRUG MANAGEMENT performed  60 MINUTES SPENT BY ME on the date of service doing chart review, history, exam, documentation & further activities per the note.

## 2024-03-26 NOTE — PLAN OF CARE
Occupational Therapy Defer: Orders received and chart reviewed. Discussed the patient's functional performance with PT. Patient is currently mobilizing and completing ADLs IND, no cognitive concerns. Patient is primarily limited by activity tolerance and pain. No acute OT needs present at this time. Patient planning to discharge home this PM with assist. As patient has a safe discharge plan, OT will sign off and defer discharge recommendations to PT.    JOSEPH Torres, OTR/L  Call or text via [employer]

## 2024-03-26 NOTE — PROGRESS NOTES
"   03/26/24 0900   Appointment Info   Signing Clinician's Name / Credentials (PT) Brigette Arechiga PT, DPT   Living Environment   People in Home spouse   Current Living Arrangements house   Home Accessibility stairs to enter home;stairs within home   Number of Stairs, Main Entrance 5   Stair Railings, Main Entrance railing on left side (ascending)   Number of Stairs, Within Home, Primary greater than 10 stairs   Stair Railings, Within Home, Primary railing on right side (ascending)   Transportation Anticipated family or friend will provide   Self-Care   Usual Activity Tolerance moderate   Current Activity Tolerance moderate   Regular Exercise No   Equipment Currently Used at Home none   Fall history within last six months yes   Number of times patient has fallen within last six months 1   Activity/Exercise/Self-Care Comment Was previously IND with all mobility and ADLs, fall related to missing curb while walking. Spouse is able to help PRN   General Information   Onset of Illness/Injury or Date of Surgery 03/24/24   Referring Physician Ernst Tam MD   Patient/Family Therapy Goals Statement (PT) wants to return home   Pertinent History of Current Problem (include personal factors and/or comorbidities that impact the POC) Per pt's chart, \"Abigail Lam is a 68 year old female with PMHx of ER+ breast cancer (2/2006) s/p bilateral mastectomy and extended chemotherapy, low bone density/osteoporosis, GERD, and very recent diagnosis of metastatic pancreatic cancer (3/8/2024) who was admitted on 3/24/2024 for nausea, vomiting, constipation, intractable back pain, and shortness of breath of 3 week duration. CT (3/24/24) findings c/w bilateral pulmonary embolisms, extensive pancreatic disease, and splenic infarcts.\"   Existing Precautions/Restrictions fall   General Observations activity   Cognition   Affect/Mental Status (Cognition) WFL;anxious   Pain Assessment   Patient Currently in Pain Yes, see Vital Sign " flowsheet   Integumentary/Edema   Integumentary/Edema no deficits were identifed   Posture    Posture Forward head position   Range of Motion (ROM)   Range of Motion ROM is WFL   Strength (Manual Muscle Testing)   Strength (Manual Muscle Testing) strength is WFL   Strength Comments Did not formally assess on this date but pt demo antigravity strength by completing STS [place]   Bed Mobility   Comment, (Bed Mobility) IND   Transfers   Comment, (Transfers) [place] with no AD   Gait/Stairs (Locomotion)   Comment, (Gait/Stairs) amb [place] with no AD, stairs with R railing and [place]   Balance   Balance Comments good seated EOB balance   Sensory Examination   Sensory Perception patient reports no sensory changes   Clinical Impression   Criteria for Skilled Therapeutic Intervention Evaluation only   PT Diagnosis (PT) impaired functional mobility secondary to deconditioning   Influenced by the following impairments decreased activity tolerance, pain   Functional limitations due to impairments community ambulation   Clinical Presentation (PT Evaluation Complexity) stable   Clinical Presentation Rationale clinical reasoning   Clinical Decision Making (Complexity) low complexity   Planned Therapy Interventions (PT) balance training;bed mobility training;gait training;home exercise program;motor coordination training;neuromuscular re-education;patient/family education;postural re-education;ROM (range of motion);stair training;strengthening;stretching;transfer training;progressive activity/exercise;risk factor education;home program guidelines   Risk & Benefits of therapy have been explained evaluation/treatment results reviewed;care plan/treatment goals reviewed;risks/benefits reviewed;current/potential barriers reviewed;participants voiced agreement with care plan;participants included;patient;spouse/significant other   Clinical Impression Comments Pt is mobilizing near/at IND baseline, mildly limited by decreased activity tolerance  and pain. No further acute PT indicated, will benefit from continued PT upon d/c.   PT Total Evaluation Time   PT Eval, Low Complexity Minutes (09253) 10   Physical Therapy Goals   PT Frequency One time eval and treatment only   PT Predicted Duration/Target Date for Goal Attainment 03/26/24   PT Goals Bed Mobility;Transfers;Gait;Stairs   PT: Bed Mobility Independent;Supine to/from sit;Goal Met   PT: Transfers Independent;Sit to/from stand;Goal Met   PT: Gait Independent;Greater than 200 feet;Goal Met   PT: Stairs Modified independent;Greater than 10 stairs;Rail on right;Goal Met   Interventions   Interventions Quick Adds Therapeutic Activity   Therapeutic Activity   Therapeutic Activities: dynamic activities to improve functional performance Minutes (60330) 8   Treatment Detail/Skilled Intervention Following eval, initiated tx. Discussion with pt's spouse on trialing low/mid back stretches and exercises to relieve some pain, edu on trialing heat/ice to alleviate pain. Discussed HH vs OP PT, pt reported preferring HH PT. Ended session supine, all needs met, call light within reach,  in room upon exit.   PT Discharge Planning   PT Plan d/c   PT Discharge Recommendation (DC Rec) home with home care physical therapy   PT Rationale for DC Rec Pt is mobilizing near/at IND baseline, slightly limited by decreased activity tolerance and pain. Anticipate safe d/c home with  once medically appropriate. Will benefit from HH PT to improve activity tolerance and strength.   PT Brief overview of current status IND   Total Session Time   Timed Code Treatment Minutes 8

## 2024-03-26 NOTE — PHARMACY-ADMISSION MEDICATION HISTORY
Pharmacist Admission Medication History    Admission medication history is complete. The information provided in this note is only as accurate as the sources available at the time of the update.    Information Source(s): Patient and CareEverywhere/SureScripts via in-person    Pertinent Information: Patient is considering stopping many of her medications given her recent diagnosis as it may explain some of the symptoms she was having.    Changes made to PTA medication list:  Added: None  Deleted: Tacrolimus ointment, fluconazole  Changed: added directions to bethanechol, docusate    Allergies reviewed with patient and updates made in EHR: yes    Medication History Completed By: Dre Watts [facility] 3/26/2024 12:55 PM    PTA Med List   Medication Sig Last Dose    acetaminophen (TYLENOL) 500 MG tablet Take 2 tablets (1,000 mg) by mouth every 8 hours for 90 days     acetaminophen (TYLENOL) 500 MG tablet Take 500-1,000 mg by mouth every 6 hours as needed for mild pain Past Month at PRN    apixaban ANTICOAGULANT (ELIQUIS) 5 MG tablet Take 2 tablets (10 mg) by mouth 2 times daily for 7 days, THEN 1 tablet (5 mg) 2 times daily for 90 days.     atorvastatin (LIPITOR) 10 MG tablet TAKE 1 TABLET BY MOUTH EVERY OTHER DAY Past Week    bethanechol (URECHOLINE) 25 MG tablet Take 25 mg by mouth 3 times daily More than a month    buprenorphine (BUTRANS) 5 MCG/HR WK patch Place 1 patch onto the skin every 7 days 3/18/2024    Cholecalciferol (VITAMIN D) 2000 UNIT tablet Take 1 tablet by mouth daily. Past Month    clobetasol (TEMOVATE) 0.05 % ointment Apply topically daily as needed Past Month at PRN    clonazePAM (KLONOPIN) 0.5 MG tablet TAKE 1 TABLET BY MOUTH AT NIGHT AS NEEDED FOR ANXIETY Past Month at PRN    clotrimazole-betamethasone (LOTRISONE) 1-0.05 % external cream  Past Month at PRN    Cyanocobalamin (VITAMIN B-12 IJ) Inject 1,000 mcg as directed Monthly More than a month    dexAMETHasone (DECADRON) 4 MG tablet Take 1 tablet  (4 mg) by mouth daily (with breakfast) Unknown at Not started    docusate sodium (COLACE) 100 MG capsule Take 100 mg by mouth daily Past Month    EPINEPHrine (ANY BX GENERIC EQUIV) 0.3 MG/0.3ML injection 2-pack  Unknown    fluticasone (FLONASE) 50 MCG/ACT spray Spray 1 Inhaler in nostril daily as needed Past Month    glycerin (LAXATIVE) 1.2 g suppository Place 1 suppository rectally daily as needed (constipation)     hydrOXYzine HCl (ATARAX) 25 MG tablet Take 1 tablet (25 mg) by mouth every 8 hours as needed for anxiety     lidocaine (LIDODERM) 5 % Patch Place onto the skin daily as needed for moderate pain Past Month    LORazepam (ATIVAN) 0.5 MG tablet Take 1 tablet by mouth as needed Sleep/anxiety Past Month    Lysine 500 MG CAPS Take 500 mg by mouth 2 times daily as needed Past Month    methocarbamol (ROBAXIN) 500 MG tablet Take 1-2 tablets (500-1,000 mg) by mouth 3 times daily as needed for muscle spasms     OLANZapine (ZYPREXA) 2.5 MG tablet Take 1 tablet (2.5 mg) by mouth at bedtime for 90 days For weight loss & diminished appetite     ondansetron (ZOFRAN ODT) 4 MG ODT tab Place 1 tablet (4 mg) under the tongue every 6 hours as needed for nausea Past Month    oxyCODONE (ROXICODONE) 5 MG tablet Take 1 tablet (5 mg) by mouth every 6 hours as needed for pain Past Month    polyethylene glycol (MIRALAX) 17 GM/Dose powder Take 17 g by mouth daily for 90 days     prochlorperazine (COMPAZINE) 5 MG tablet Take 1 tablet (5 mg) by mouth every 6 hours as needed for vomiting     Pseudoephedrine HCl (SUDAFED PO) Take 60 mg by mouth as needed More than a month    Psyllium (FIBER) 0.52 G CAPS Take 1 tablet by mouth daily Past Month    senna-docusate (SENOKOT-S/PERICOLACE) 8.6-50 MG tablet Take 1 tablet by mouth 2 times daily as needed for constipation     SODIUM FLUORIDE 5000 ENAMEL 1.1-5 % GEL USE TO BRUSH TEETH ONCE DAILY AT NIGHT TIME- DO NOT RINSE, DRINK OR EAT FOR 30 MINS AFTER USE Past Month

## 2024-03-26 NOTE — PROGRESS NOTES
"Care Management Discharge Note    Discharge Date: 03/26/2024       Discharge Disposition:  Home    Discharge Services:  OP    Discharge DME:  None    Discharge Transportation: family or friend will provide    Private pay costs discussed: Not applicable    Does the patient's insurance plan have a 3 day qualifying hospital stay waiver?  No    PAS Confirmation Code:    Patient/family educated on Medicare website which has current facility and service quality ratings:      Education Provided on the Discharge Plan:  Yes  Persons Notified of Discharge Plans: Patient and spouse  Patient/Family in Agreement with the Plan:  YES    Handoff Referral Completed: Yes EHO    Additional Information:  RNCC met with patient and spouse per request of primary team. Patient stated that she is very clear about her goals of care. Patient stated she wants quality over quantity. Patient stated she does \"not want a lot of chemicals stuck in my veins so I can feel awful\". Patient stated she has a lot of decisions to make quickly (tearful). Patient has an appt with Palliative tomorrow. RNCC educated pt on what Palliative does. Patient stated that the idea of managing her pain so she can spend damon time with her family is the route she wants to go. RNCC encouraged patient to also listen to Oncology on Monday to see if there are options that align with her GOC. Patient stated she will listen to all the providers and make the best choice for her and her family. Patient stated she has 2 children that live in different states and they are willing to rent a place nearby to spend time with her. Patient was tearful throughout conversation. Patient has a long term psychiatrist Holly Hebert MD with whom she is following. Palliative OP appt tomorrow and Onc 4/1.   Patient is discharging to home- son is picking up pt and spouse.     Future Appointments   Date Time Provider Department Center   3/27/2024  4:05 PM Hunter Schmidt MD [facility] "   4/1/2024  7:00 AM Preet Rangel MD [facility] [place]   4/1/2024  1:15 PM Ludy Barnes [facility]   10/4/2024 10:00 AM Lefty Matthew MD [facility] [place]     Magnolia Albarran RNCC float  Nurse Coordinator    Covering for 5A  Phone [phone]  Pager [phone]  Social Work and Care Management Department   SEARCHABLE in [employer] - search CARE COORDINATOR     [place] [employer] [place] ([phone]) Saturday & Sunday; ([phone]) [facility] Recognized Holidays     Units: 5A Onc 5201 thru 5219 RNCC, 5A Onc 5220 thru 5240 RNCC, 5C OFFSERVICE [phone] RNCC & 5C OFF SERVICE [phone] RNCC Pager: [phone]    Units: 6B Vocera, 6C Card 6401 thru 6420 RNCC, 6C Card 6502 thru 6514 RNCC, & 6C Card 6515 thru 6519 RNCC  Pager: [phone]    Units: 7A SOT RNCC Vocera, 7B Med Surg Vocera, 7C Med Surg 7401 thru 7418 RNCC & 7C Med Surg 7502 thru 7521 RNCC Pager: [phone]    Units: 6A Vocera & 4A CVICU Vocera, 4C MICU Vocera, and 4E SICU Vocera   Pager: [phone]    Units: 5 Ortho Vocera & 5 Med Surg Vocera  Pager: [phone]    Units: 6 Med Surg Vocera & 8 Med Surg Vocera  Pager [phone]

## 2024-03-26 NOTE — PLAN OF CARE
Goal Outcome Evaluation:    /78, OVSS on RA. Tele discontinued; continuous pulse ox monitoring discontinued. 1L LR bolus given. C/o tightness/pain in chest, providers aware. EKG ordered and completed. Pt c/o anxiety, one time order for ativan obtained and given.

## 2024-03-26 NOTE — PROGRESS NOTES
[id number] VSS. A&Ox4. C/O mild pain, controlled w/ PRN oxycodone. Denies nausea. Heparin infusing @ 700 units/hr, within therapeutic range. Pt resting comfortably, no other complaints at this time. Will continue w/ POC.

## 2024-03-26 NOTE — PLAN OF CARE
Physical Therapy Discharge Summary    Reason for therapy discharge:    Discharged to home with home therapy.    Progress towards therapy goal(s). See goals on Care Plan in [facility] electronic health record for goal details.  Goals partially met.  Barriers to achieving goals:   discharge from facility.    Therapy recommendation(s):    Continued therapy is recommended.  Rationale/Recommendations:  Recommend pt continue improving impairments with HH PT.

## 2024-03-26 NOTE — DISCHARGE SUMMARY
"[facility]  Discharge Summary - Medicine & Pediatrics       Date of Admission:  3/24/2024  Date of Discharge:  3/26/2024  Discharging Provider: Jacob Hebert MD  Discharge Service: Medicine Service, BRENDA TEAM 4    Discharge Diagnoses   #Acute pulmonary embolism  #Several bilateral nonocclusive subsegmental pulmonary emboli  #Chronic splenic vein thrombosis with collateralisation  #New splenic infarcts  #Metastatic pancreatic cancer with mets to liver, lymph nodes, peritoneum  #Intractable pain in the epigastric region and spine  #Nausea w/o vomiting  #Constipation  #Anorexia  #Hyperlipidemia   #GERD     Clinically Significant Risk Factors     # Cachexia: Estimated body mass index is 16.17 kg/m  as calculated from the following:    Height as of this encounter: 1.562 m (5' 1.5\").    Weight as of this encounter: 39.5 kg (87 lb).       Follow-ups Needed After Discharge   Follow-up Appointments     Adult [facility] Follow-up and recommended labs and tests      Follow up with primary care provider, KAYCEE HARRINGTON, within 7 days to   evaluate medication change, for hospital follow- up, and regarding new   diagnosis.  No follow up labs or test are needed.    Follow up with Oncology on Monday as scheduled, and Palliative Care   tomorrow as scheduled.      Appointments on [place] and/or [facility] (with [facility] or [facility]   provider or service). Call [phone] if you haven't heard regarding   these appointments within 7 days of discharge.            Unresulted Labs Ordered in the Past 30 Days of this Admission       Date and Time Order Name Status Description    3/24/2024  4:55 PM Blood Culture Peripheral Blood Preliminary     3/24/2024  4:55 PM Blood Culture Peripheral Blood Preliminary     3/15/2024  4:30 PM Tumor profile In process         These results will be followed up by PCP and oncology.     Discharge Disposition   Discharged to home  Condition at discharge: " Stable    Hospital Course   Abigail Lam was admitted on 3/24/2024 for bilateral pulmonary embolisms and splenic infarcts. The following problems were addressed during her hospitalization:    #Acute pulmonary embolism  #Several bilateral nonocclusive subsegmental pulmonary emboli  #Chronic splenic vein thrombosis with collateralisation  #New splenic infarcts  Patient presented to the emergency department after recent short-trip to  for evaluation of dehydration, generalized fatigue and weakness likely secondary to her metastatic pancreatic cancer. Endorses that she has been having chest discomfort and shortness of breath since last 3 weeks prior to admission. CT PE notable for acute pulmonary embolism, several subsegmental bilateral pulmonary emboli, and new splenic infarcts. EKG (3/24/24 and 3/26/24) within normal limits. Troponin elevated but downtrending. CT chest not notable for any right heart strain. Echo (3/25/24) with normal left and right ventricular function, no right heart strain. Pulmonary emboli likely secondary to hypercoagulability 2/2 metastatic malignancy and recent travel. PESI score intermediate risk. Therapeutic heparin gtt started on 3/24/24 and transitioned to DOAC on 3/26/24. Pt remained hemodynamically stable, on room air during course anti-coagulation therapy.   - Continue apixaban 10 mg BID for 7 days (3/6/24 - 3/12/24), then reduce dose to 5 mg BID   - Will need to hold anti-coagulation before and after any invasive procedures, such as celiac plexus neurolysis (given significance of pain and limited time for therapeutic relief encouraged patient to schedule when able)      #Metastatic pancreatic cancer with mets to liver, lymph nodes, peritoneum  #Intractable pain in the epigastric region and spine  #Nausea w/o vomiting  #Constipation  #Anorexia  Recently diagnosed with metastatic pancreatic cancer with mets to liver, lymph nodes and peritoneum presented with severe nausea and  constipation with significant stool burden on CT; epigastric abdominal pain, acute back pain likely 2/2 metastatic cancer. Endorsed significant weight loss, poor PO intake, and decreased appetite in the past 3 weeks prior to admission. She is yet to be seen by Dr. Rangel regarding cancer treatment options and strategies. She is very anxious and overwhelmed with the new diagnosis. Abigail and her family are still processing the news. She recently saw Dr. Schmidt (Palliative care team) for her symptom management. Work up notable for mild leukocytosis, elevated ALP. UA notable for trace positive LE with hyaline casts. Lactate WNL. CXR with no consolidation. Low concern for infection, symptoms more concerning for acute stress 2/2 metastatic disease. Discussed imaging with Panc-Bili team; pancreatic mass dose not appear to be causing gastric outlet obstruction (just abutting the wall) to explain patient's symptoms, therefore, there would be likely no benefit with stenting. Furthermore, if stenting was an option she would need to have anticoagulation safely held in order to get procedure. Celiac plexus neurolysis is an option for pain relief, however, this would also need to be done with anticoagulation held ponce-procedurally. Given acute PE and ability to titrate medications for pain/nausea relief will defer to outpatient for now. Palliative care, Oncology, & Nutrition consulted while inpatient for assistance.  - Pain control:              - Continue PTA Dexamethasone 4 mg              - Increase Tylenol to 1000 mg TID               - Continue PTA Oxycodone 5 mg Q3H PRN               - Continue PTA Butrans patch              - Start Robaxin 500-1000 TID PRN  - Anxiety:              - Continue PTA Clonazepam 0.5 mg at bedtime PRN   - Can trial atarax 25 mg TID PRN (prescribed)  - Constipation:   - Continue PTA colace daily              - Start Miralax daily              - Start senokot 1 tab BID PRN   - Start glycerin  suppository daily PRN  - Nausea:              - Continue PTA zofran PRN   - Start compazine PRN              - Continue PTA decadron 4 mg as per above   - Continue PTA omeprazole-sodium bicarbonate & pepcid daily  - Oncology consulted              - Discussed with pt and family the diagnosis, prognosis, and treatment options              - Radiation may be option for palliation in areas causing pain or symptoms              - Chemotherapy can also be an option for palliative intent, and my prolong life by weeks-months              - Without chemotherapy, overall survival is on the matter of months              - Plan to follow-up with outpatient oncologist as scheduled (Monday, 4/1)     #Hyperlipidemia  - Continue PTA Atorvastatin     #GERD  - Continue PTA pepcid  - Continue PTA Omeprazole-sodium bicarb      Consultations This Hospital Stay   PHARMACY IP CONSULT  ONCOLOGY ADULT IP CONSULT  PHYSICAL THERAPY ADULT IP CONSULT  OCCUPATIONAL THERAPY ADULT IP CONSULT  PALLIATIVE CARE ADULT IP CONSULT  NURSING TO CONSULT FOR VASCULAR ACCESS CARE IP CONSULT  NURSING TO CONSULT FOR VASCULAR ACCESS CARE IP CONSULT  NUTRITION SERVICES ADULT IP CONSULT  GI PANCREATICOBILIARY ADULT IP CONSULT  NURSING TO CONSULT FOR VASCULAR ACCESS CARE IP CONSULT  VASCULAR ACCESS FOR PICC PLACEMENT ADULT IP CONSULT  SPIRITUAL HEALTH SERVICES IP CONSULT  PHARMACY LIAISON FOR MEDICATION COVERAGE CONSULT  CARE MANAGEMENT / SOCIAL WORK IP CONSULT  GI LUMINAL ADULT IP CONSULT    Code Status   Full Code     The patient was discussed with Dr. Emilee Spears PGY-3  [address] 5A ONCOLOGY  [address] [facility] [address]  Phone: [phone]  ______________________________________________________________________    Physical Exam   Vital Signs: Temp: 98  F (36.7  C) Temp src: Oral BP: (!) 168/78 Pulse: 91   Resp: 16 SpO2: 97 % O2 Device: None (Room air)    Weight: 87 lbs 0 oz  Constitutional: awake, alert,  cooperative, no apparent distress, and appears stated age  Respiratory: No increased work of breathing, good air exchange, clear to auscultation bilaterally, no crackles or wheezing  Cardiovascular: Regular rate and rhythm, normal S1 and S2, and no murmur noted  GI: No scars, normal bowel sounds, soft, non-distended, non-tender      Primary Care Physician   KAYCEE HARRINGTON    Discharge Orders      Reason for your hospital stay    You were hospitalized for a pulmonary embolism. You were started on anticoagulation treatment (blood thinners). Please start taking eliquis (apixaban) as prescribed. You were seen by the Palliative Care specialists while hospitalized. Please continue taking prescribed medications for pain and nausea. You should add scheduled tylenol 1000 milligrams three times daily to your regimen. While taking oxycodone as needed and having the butrans patch in place it is important to maintain a good bowel regimen. Use colace and miralax daily, with senna tablets as needed.     Activity    Your activity upon discharge: activity as tolerated     Adult [facility] Follow-up and recommended labs and tests    Follow up with primary care provider, KAYCEE HARRINGTON, within 7 days to evaluate medication change, for hospital follow- up, and regarding new diagnosis.  No follow up labs or test are needed.    Follow up with Oncology on Monday as scheduled, and Palliative Care tomorrow as scheduled.      Appointments on [place] and/or [facility] (with [facility] or [facility] provider or service). Call [phone] if you haven't heard regarding these appointments within 7 days of discharge.     Brief Discharge Instructions    - Pain: Take tylenol, oxycodone, robaxin (methocarbamol), decadron (dexamethasone) for pain as prescribed. Use lidocaine patches as prescribed for pain as well.  - Anxiety: Use klonipin (clonazepam) as prescribed. You can trial atarax (hydroxyzine) as well.  - Weight loss/Poor appetite: Take decadron  (dexamethasone) and zyprexa (olanzapine) as prescribed. You can discuss zyprexa with your psychiatrist before starting if needed.  - Constipation: Use colace and miralax daily. You can use senna tablets and glycerin suppositories as well. The goal is to have a soft bowel movement daily.  - Nausea: Use zofran (odansetron) and prochlorperazine (compazine) as needed for nausea. Continue taking pepcid and omeprazole/sodium bicarbonate daily.     Diet    Follow this diet upon discharge: Orders Placed This Encounter      Combination Diet Regular Diet Adult       Significant Results and Procedures   Results for orders placed or performed during the hospital encounter of 03/24/24   XR Chest 2 Views    Narrative    EXAM: XR CHEST 2 VIEWS  3/24/2024 5:20 PM      HISTORY: SOB    COMPARISON: Radiograph 3/8/2024. CT 3/8/2024.    FINDINGS: Two views of the chest. Trachea is midline. Normal  cardiomediastinal silhouette. No pleural effusion, consolidation or  pneumothorax. Soft tissue density overlying the mid hemithoraces  bilaterally on the PA view correlates with breast implants. No acute  osseous abnormalities.      Impression    IMPRESSION: No acute cardiopulmonary findings.    I have personally reviewed the examination and initial interpretation  and I agree with the findings.    MOE HORTON MD         SYSTEM ID:  [id number]   CT Chest Pulmonary Embolism w Contrast     Value    Radiologist flags Acute pulmonary embolus (Urgent)    Narrative    EXAMINATION: CTA pulmonary angiogram, 3/24/2024 7:52 PM     COMPARISON: CT 3/8/2024    HISTORY: Short of breath, elevated d-dimer    TECHNIQUE: Volumetric helical acquisition of CT images of the chest  from the lung apices to the kidneys were acquired after the  administration of 80 mL of Isovue-370 IV contrast.  Post-processed  multiplanar and/or MIP reformations were obtained, archived to PACS  and used in interpretation of this study.     FINDINGS:      Contrast bolus is:  adequate.  Exam is positive for acute pulmonary  embolism. Several nonocclusive subsegmental pulmonary emboli in  branches supplying the right lower and left lower lobes. For example,  in the right lower lobe (series 9, image 145 and 178). In the left  lower lobe (series 9, image 150).      The largest right ventricle transaxial diameter is (measured from  endocardium to endocardium): 3.1 cm   The largest left ventricle transaxial diameter is (measured from  endocardium to endocardium): 3.1 cm  RV/LV ratio is: Less than 1.1 (if ratio greater than 1.1 then sign is  suspicious for right heart strain)  Reflux of contrast into the IVC? no  Paradoxical bowing of the interventricular septum to the left? no  Pericardial effusion?: not present    Remainder of exam:    Devices: None.    Lower neck and axillae: No enlarged supraclavicular nodes are present.  No actionable nodule is present in the imaged portion of the thyroid  lobes. No axillary lymphadenopathy.    Heart: Normal heart size. No pericardial fluid or thickening.    Mediastinum and chantal: No mediastinal mass is present. No enlarged  lymph nodes are present.    Vessels: Normal caliber of the aorta and main pulmonary artery.  Aberrant right subclavian artery. No significant atherosclerotic  disease.    Airways: The central tracheobronchial tree is clear.    Lungs: The lungs are clear. No pleural effusion or pneumothorax    Upper abdomen: Please refer to separately dictated CT of the abdomen  for dedicated findings.    Bones: No acute or aggressive osseous abnormality.    Soft tissues: Bilateral breast implants.      Impression    IMPRESSION:   1. Exam is positive for acute pulmonary embolism. Subsegmental  pulmonary emboli involving the right lower and left lower lobes.      Evidence for right heart strain or increased right heart pressures?   is not present.     2. Please refer to separate dictated CT abdomen and pelvis for  dedicated findings.      [Urgent Result:  Acute pulmonary embolus]    Finding was identified on 3/24/2024 8:46 PM.     Dr. Ayala was contacted by Dr. Maldonado at 3/24/2024 8:55 PM and  verbalized understanding of the urgent finding.     In the event of a positive result for acute pulmonary embolism  resulting in right heart strain, consider calling the   [facility] hospital  for PERT (Pulmonary Embolism Response Team)  Activation?    PERT -- Pulmonary Embolism Response Team (Multidisciplinary team  including cardiology, interventional radiology, critical care,  hematology)    I have personally reviewed the examination and initial interpretation  and I agree with the findings.    MOE HORTON MD         SYSTEM ID:  [id number]   CT Abdomen Pelvis w Contrast    Narrative    EXAMINATION: CT ABDOMEN PELVIS W CONTRAST, 3/24/2024 7:53 PM    INDICATION: abd pain. History of breast cancer with recent suspicion  of primary pancreatic neoplasm with metastatic disease on PET/CT.    COMPARISON STUDY: PET CT 3/15/2024.    TECHNIQUE: CT scan of the abdomen and pelvis was performed on  multidetector CT scanner using volumetric acquisition technique and  images were reconstructed in multiple planes with variable thickness  and reviewed on dedicated workstations.     CONTRAST: 53 mL Isovue-370 injected IV without oral contrast    CT scan radiation dose is optimized to minimum requisite dose using  automated dose modulation techniques.    FINDINGS:    Lower thorax: Please see separate CT pulmonary embolus study.    Liver: Extensive metastatic disease throughout the liver present as  described on recent PET/CT without significant change given short  interval between the studies. Mild periportal edema. Portal vein  patent.    Biliary System: Normal gallbladder. No extrahepatic biliary ductal  dilation.    Pancreas: The lesion in the pancreatic tail described on PET/CT is  again noted. This is heterogeneously hypoattenuating and measures  approximately 3.1 x 2.1 cm,  series 5 image 56. Remainder of the gland  is atrophic. No dilatation of the pancreatic duct. The mass abuts the  posterior gastric wall. The main splenic vein has thrombosed with  collateralization. There is soft tissue density along the celiac axis  and to a lesser degree SMA which may represent tumor infiltration  given extensive metastatic disease or may be edema. This is not a  dedicated staging study therefore no arterial phase available.    Adrenal glands: Mild thickening of the left adrenal gland. No right  adrenal nodules.    Spleen: Spleen is not enlarged. There are however wedge-shaped defects  consistent with infarcts which are new from prior PET/CT. These are  noted in the anterior mid spleen, series 5 image 39 and inferior  spleen series 5 image 56. Trace perisplenic fluid. The splenic vein as  discussed above has been thrombosed with collateralization in the left  upper quadrant.    Kidneys: Large cyst in the lower pole of the left kidney appears  simple measuring 5.7 cm. Right extrarenal pelvis. Mild left  pelvocaliectasis noted, increased from PET/CT. The left ureter however  is difficult to follow but does not appear significantly distended.  Small cyst in the mid right kidney.  No renal calculi.    Gastrointestinal tract: Normal appendix. Bowel wall thickening  involving small and large bowel demonstrated. This is most marked  within the colonic region from ascending through transverse. There is  also some mild wall thickening of the descending and possibly  rectosigmoid colon.    Mesentery/peritoneum/retroperitoneum: Small volume of ascites in the  abdomen and pelvis has increased from PET/CT. Extensive peritoneal  nodularity throughout the mesentery and omentum present consistent  with peritoneal carcinomatosis. This appears to have worsened compared  to recent study. No free air.    Lymph nodes: No definite enlarged retroperitoneal lymph nodes.  Scattered small mesenteric nodes versus  metastatic deposits. Small  mildly enlarged portal nodes. On coronal imaging there also appears to  be some mildly enlarged gastrohepatic ligament nodes.    Vasculature: Midline vessels are normal in caliber with  atherosclerosis of the aorta. As above there is attenuation/chronic  thrombosis of the splenic vein secondary to the pancreatic lesion with  collateralization and varices.    Pelvis: Urinary bladder is normal.  Hysterectomy.    Osseous structures: The lesion in the right para-midline sacrum seen  on PET/CT is difficult to visualize on CT. Degenerative changes.      Soft tissues: Partially visualized breast implants. See separate CT  chest. Mild body wall edema.      Impression    IMPRESSION:   1.  As described on recent PET/CT, there is a mass in the pancreatic  tail abutting the posterior gastric wall with numerous metastatic  lesions to the liver, overall similar in appearance to recent study.  2.  Findings consistent with peritoneal carcinomatosis also present as  described on PET/CT with increase in size and distribution of the  nodules and increasing volume of ascites.  3.  Wall thickening involving small and large bowel loops may be due  to serosal peritoneal metastatic disease or may be reactive with  overall third spacing of fluid. Correlation with any GI symptoms for  enterocolitis.  4.  Splenic infarcts are demonstrated, new from PET/CT. Chronic  attenuation/thrombosis of the splenic vein with collateralization.  5.  A few enlarged portal nodes may be reactive versus metastatic.  There are also some gastrohepatic ligament nodes suspected. Small  mesenteric nodes versus peritoneal deposits.  6.  No definite CT correlate identified for osseous metastatic lesion  in the sacrum PET/CT.  7.  Please see separate CT chest.    MOE HORTON MD         SYSTEM ID:  [id number]   Echo Complete     Value    LVEF  60-65%    Narrative    [id number]  [id number]  [id number]  [id number]^RHODA^[facility]  [facility],[place]  Echocardiography Laboratory  [address] [place] [address]     Name: ERICA FERNANDES  MRN: [id number]  : 1955  Study Date: 2024 07:16 AM  Age: 68 yrs  Gender: Female  Patient Location: [facility]  Reason For Study: Pulmonary Emboli  Ordering Physician: YESENIA DUONG  Performed By: Yary Hoyt RDCS     BSA: 1.3 m2  Height: 61 in  Weight: 88 lb  ______________________________________________________________________________  Procedure  Complete Portable Echo Adult. Contrast Optison. Technically difficult  study.Extremely poor acoustic windows. Optison (NDC #2576-5693-98) given  intravenously. Patient was given 6 ml mixture of 3 ml Optison and 6 ml saline.  3 ml wasted.  ______________________________________________________________________________  Interpretation Summary  Technically difficult study.Extremely poor acoustic windows.  Global and regional left ventricular function is normal with an EF of 60-65%.  Right ventricular function, chamber size, wall motion, and thickness are  normal.  Pulmonary artery systolic pressure cannot be assessed.  The inferior vena cava is normal.  No pericardial effusion is present.  ______________________________________________________________________________  Left Ventricle  Global and regional left ventricular function is normal with an EF of 60-65%.  Left ventricular wall thickness is normal. Left ventricular size is normal.  Left ventricular diastolic function is normal. No regional wall motion  abnormalities are seen.     Right Ventricle  Right ventricular function, chamber size, wall motion, and thickness are  normal.     Atria  Both atria appear normal.     Mitral Valve  The mitral valve is normal.     Aortic Valve  The valve leaflets are not well visualized. On Doppler interrogation, there is  no significant stenosis or regurgitation.     Tricuspid Valve  The valve leaflets are not well visualized. On Doppler  interrogation, there is  no significant stenosis or regurgitation. Pulmonary artery systolic pressure  cannot be assessed.     Pulmonic Valve  The pulmonic valve cannot be assessed.     Vessels  The pulmonary artery cannot be assessed. The inferior vena cava is normal.  Dilated aortic root and ascending aorta for BSA of 1.3m2.     Pericardium  No pericardial effusion is present.  ______________________________________________________________________________  MMode/2D Measurements & Calculations  IVSd: 0.64 cm     LVIDd: 3.7 cm  LVIDs: 2.6 cm  LVPWd: 0.67 cm  FS: 30.2 %  LV mass(C)d: 63.4 grams  LV mass(C)dI: 47.6 grams/m2  Ao root diam: 2.9 cm  asc Aorta Diam: 2.9 cm  LVOT diam: 2.0 cm  LVOT area: 3.1 cm2  Ao root diam index Ht(cm/m): 1.9  Ao root diam index BSA (cm/m2): 2.2  Asc Ao diam index BSA (cm/m2): 2.2  Asc Ao diam index Ht(cm/m): 1.9  RWT: 0.36     Doppler Measurements & Calculations  MV E max андрей: 50.0 cm/sec  MV A max андрей: 75.8 cm/sec  MV E/A: 0.66  MV dec time: 0.21 sec  E/E' av.8     Lateral E/e': 3.9  Medial E/e': 5.7     ______________________________________________________________________________  Report approved by: Fatimah Jack 2024 09:27 AM             Discharge Medications   Current Discharge Medication List        START taking these medications    Details   apixaban ANTICOAGULANT (ELIQUIS) 5 MG tablet Take 2 tablets (10 mg) by mouth 2 times daily for 7 days, THEN 1 tablet (5 mg) 2 times daily for 90 days.  Qty: 208 tablet, Refills: 0    Associated Diagnoses: Other acute pulmonary embolism, unspecified whether acute cor pulmonale present (H)      glycerin (LAXATIVE) 1.2 g suppository Place 1 suppository rectally daily as needed (constipation)  Qty: 25 suppository, Refills: 0    Associated Diagnoses: Constipation, unspecified constipation type      hydrOXYzine HCl (ATARAX) 25 MG tablet Take 1 tablet (25 mg) by mouth every 8 hours as needed for anxiety  Qty: 60 tablet, Refills: 0     Associated Diagnoses: Malignant neoplasm of pancreas, unspecified location of malignancy (H)      OLANZapine (ZYPREXA) 2.5 MG tablet Take 1 tablet (2.5 mg) by mouth at bedtime for 90 days For weight loss & diminished appetite  Qty: 90 tablet, Refills: 0    Associated Diagnoses: Malignant neoplasm of body of pancreas (H)      polyethylene glycol (MIRALAX) 17 GM/Dose powder Take 17 g by mouth daily for 90 days  Qty: 510 g, Refills: 0    Associated Diagnoses: Constipation, unspecified constipation type      prochlorperazine (COMPAZINE) 5 MG tablet Take 1 tablet (5 mg) by mouth every 6 hours as needed for vomiting  Qty: 30 tablet, Refills: 0    Associated Diagnoses: Malignant neoplasm of body of pancreas (H)      senna-docusate (SENOKOT-S/PERICOLACE) 8.6-50 MG tablet Take 1 tablet by mouth 2 times daily as needed for constipation  Qty: 30 tablet, Refills: 0    Associated Diagnoses: Constipation, unspecified constipation type           CONTINUE these medications which have CHANGED    Details   !! acetaminophen (TYLENOL) 500 MG tablet Take 2 tablets (1,000 mg) by mouth every 8 hours for 90 days  Qty: 540 tablet, Refills: 0    Associated Diagnoses: Malignant neoplasm of body of pancreas (H)      methocarbamol (ROBAXIN) 500 MG tablet Take 1-2 tablets (500-1,000 mg) by mouth 3 times daily as needed for muscle spasms  Qty: 21 tablet, Refills: 0    Associated Diagnoses: Other chest pain       !! - Potential duplicate medications found. Please discuss with provider.        CONTINUE these medications which have NOT CHANGED    Details   !! acetaminophen (TYLENOL) 500 MG tablet Take 500-1,000 mg by mouth every 6 hours as needed for mild pain      atorvastatin (LIPITOR) 10 MG tablet TAKE 1 TABLET BY MOUTH EVERY OTHER DAY  Qty: 90 tablet, Refills: 3    Associated Diagnoses: Personal history of malignant neoplasm of breast      bethanechol (URECHOLINE) 25 MG tablet Take 25 mg by mouth 3 times daily      buprenorphine (BUTRANS) 5  MCG/HR WK patch Place 1 patch onto the skin every 7 days  Qty: 4 patch, Refills: 0    Associated Diagnoses: Neoplasm related pain      Cholecalciferol (VITAMIN D) 2000 UNIT tablet Take 1 tablet by mouth daily.      clobetasol (TEMOVATE) 0.05 % ointment Apply topically daily as needed      clonazePAM (KLONOPIN) 0.5 MG tablet TAKE 1 TABLET BY MOUTH AT NIGHT AS NEEDED FOR ANXIETY  Qty: 60 tablet, Refills: 0    Associated Diagnoses: Personal history of malignant neoplasm of breast      clotrimazole-betamethasone (LOTRISONE) 1-0.05 % external cream       Cyanocobalamin (VITAMIN B-12 IJ) Inject 1,000 mcg as directed Monthly      dexAMETHasone (DECADRON) 4 MG tablet Take 1 tablet (4 mg) by mouth daily (with breakfast)  Qty: 26 tablet, Refills: 0    Associated Diagnoses: Neoplasm related pain; Pancreatic cancer metastasized to liver (H); Anorexia symptom      docusate sodium (COLACE) 100 MG capsule Take 100 mg by mouth daily      EPINEPHrine (ANY BX GENERIC EQUIV) 0.3 MG/0.3ML injection 2-pack       fluticasone (FLONASE) 50 MCG/ACT spray Spray 1 Inhaler in nostril daily as needed      lidocaine (LIDODERM) 5 % Patch Place onto the skin daily as needed for moderate pain      LORazepam (ATIVAN) 0.5 MG tablet Take 1 tablet by mouth as needed Sleep/anxiety      Lysine 500 MG CAPS Take 500 mg by mouth 2 times daily as needed      estradiol (VAGIFEM) 10 MCG TABS vaginal tablet 1 tablet every other day.      famotidine (PEPCID) 40 MG tablet Take 40 mg by mouth daily      ondansetron (ZOFRAN ODT) 4 MG ODT tab Place 1 tablet (4 mg) under the tongue every 6 hours as needed for nausea  Qty: 60 tablet, Refills: 3    Associated Diagnoses: Pancreatic cancer metastasized to liver (H); Nausea      oxyCODONE (ROXICODONE) 5 MG tablet Take 1 tablet (5 mg) by mouth every 6 hours as needed for pain  Qty: 30 tablet, Refills: 0    Associated Diagnoses: Pancreatic mass      Pseudoephedrine HCl (SUDAFED PO) Take  by mouth as needed.      Psyllium  (FIBER) 0.52 G CAPS Take 1 tablet by mouth daily      SODIUM FLUORIDE 5000 ENAMEL 1.1-5 % GEL USE TO BRUSH TEETH ONCE DAILY AT NIGHT TIME- DO NOT RINSE, DRINK OR EAT FOR 30 MINS AFTER USE      tacrolimus (PROTOPIC) 0.1 % external ointment Apply topically 2 times daily 5-6 times weekly on lips and maybe 1-2 times weely on lips Mometasone  Qty: 30 g, Refills: 0    Associated Diagnoses: Allergic contact dermatitis due to other agents       !! - Potential duplicate medications found. Please discuss with provider.        STOP taking these medications       fluconazole (DIFLUCAN) 100 MG tablet Comments:   Reason for Stopping:             Allergies   Allergies   Allergen Reactions    Bee Venom      Other reaction(s): Edema  Swelling    Diphtheria Toxoid-Tetanus Tox-Thimerosal      Other reaction(s): Edema    Ketorolac      Other reaction(s): Hypertension    Ketorolac Tromethamine     Ketorolac Tromethamine      Other reaction(s): Other (See Comments)  Creates high blood pressure    Nickel Other (See Comments)     Per patch test      Nitrofurantoin      Other reaction(s): Edema  Lower lip swelling.    Propolis      Other reaction(s): Edema  Swelling    Scopolamine Hives

## 2024-03-26 NOTE — PLAN OF CARE
Goal Outcome Evaluation:    Discharge  D: Orders for discharge and outpatient medications written.  I: Home medications and return to clinic schedule reviewed with patient. Discharge instructions and parameters for calling Health Care Provider reviewed. Patient left at 1:25pm accompanied by .   A: Patient/family verbalized understanding and was ready for discharge.   P: Patient instructed to  medications in Pharmacy. Follow up as scheduled.

## 2024-03-27 NOTE — NURSING NOTE
"Oncology Rooming Note    March 27, 2024 4:29 PM   Abigail Lam is a 68 year old female who presents for:    Chief Complaint   Patient presents with    Oncology Clinic Visit     [facility] RETURN - PANCREATIC METS TO LIVER     Initial Vitals: BP (!) 160/95 (BP Location: Right arm, Patient Position: Chair, Cuff Size: Adult Small)   Pulse 102   Temp 97.7  F (36.5  C) (Oral)   Resp 16   Ht 1.562 m (5' 1.5\")   Wt 39.1 kg (86 lb 1.6 oz)   SpO2 98%   BMI 16.01 kg/m   Estimated body mass index is 16.01 kg/m  as calculated from the following:    Height as of this encounter: 1.562 m (5' 1.5\").    Weight as of this encounter: 39.1 kg (86 lb 1.6 oz). Body surface area is 1.3 meters squared.  Mild Pain (3) Comment: Data Unavailable   No LMP recorded. Patient is postmenopausal.  Allergies reviewed: Yes  Medications reviewed: Yes    Medications: Medication refills not needed today.  Pharmacy name entered into [employer]:    [facility] - [place], [place] - [address] [id number] - [place], [place] - [address] [facility]  [id number] - CLOSED - [place], [place] - [address] [id number] - [place], [place] - [address]    Frailty Screening:   Is the patient here for a new oncology consult visit in cancer care? 2. No    Adonay Galvan LPN              "

## 2024-03-27 NOTE — PROGRESS NOTES
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  3/2024 found to have metastatic pancreatic adenocarcinoma  3/24-26th admitted with bilateral PEs     Social - Lives with  Luis F. 2 children; grandchildren     Care Planning -       History:   History gathered today from: patient, family/loved ones, medical chart  Luis F [place] with her    Just was in the hospital for PEs, minimal PO intake.  Now on Eliquis    Back home: eating and drinking minimally; asking for fluids.  We set some up for tomorrow for her.     Pain remains severe  Thinks Butrans 5 is helping  Oxycodone--can't tolerate (nausea).     Nausea remains severe  Hasn't tried olanzapine 2.5 mg; wants to talk with her psychiatrist about this first; sees her tomorrow.  Zofran too drying    Constipation severe; on senna, can't manage PEG 2/2 oral volume, doing suppositories and enemas    We talked a lot about her shock, grief.  She is doubtful she'd do any treatment but is still not sure; gathering info; sees DR Rangel and a pancreatic oncologist at [facility] on video next week.  Her biggest priority is qol; she'd contemplate some anticancer tx if it was going to allow her qol to be acceptable.     Anxiety--long standing; wants to switch from clonazepam to ativan--works better. \"I am going to be having panic attacks I assume because I'm dying and I think ativan is better for me for that.\"    PE: There were no vitals taken for this visit.   Wt Readings from Last 3 Encounters:   03/26/24 39.5 kg (87 lb)   03/22/24 38.8 kg (85 lb 9.6 oz)   03/20/24 39.5 kg (87 lb)     Alert NAD      Data reviewed:  I reviewed recent labs and imaging, my comments:  Na 134  AlkP 425  CA 19.9 " 320k  Bili 0.7    IMPRESSION:   1.  As described on recent PET/CT, there is a mass in the pancreatic  tail abutting the posterior gastric wall with numerous metastatic  lesions to the liver, overall similar in appearance to recent study.  2.  Findings consistent with peritoneal carcinomatosis also present as  described on PET/CT with increase in size and distribution of the  nodules and increasing volume of ascites.  3.  Wall thickening involving small and large bowel loops may be due  to serosal peritoneal metastatic disease or may be reactive with  overall third spacing of fluid. Correlation with any GI symptoms for  enterocolitis.  4.  Splenic infarcts are demonstrated, new from PET/CT. Chronic  attenuation/thrombosis of the splenic vein with collateralization.  5.  A few enlarged portal nodes may be reactive versus metastatic.  There are also some gastrohepatic ligament nodes suspected. Small  mesenteric nodes versus peritoneal deposits.  6.  No definite CT correlate identified for osseous metastatic lesion  in the sacrum PET/CT.  7.  Please see separate CT chest.     database reviewed:       Impression & Recommendations:  69 yo with recently diagnosed metastatic pancreatic cancer    Pain  Increase Butrans to 10 mcg/hr    Nausea  Encouraged her to try the Zyprexa    Constipation  Let's add lubiprostone    Anxiety  Ok to switch clonazepam to ativan    Dehydration; poor PO intake  We arranged IVF for her tomorrow; will try to get them Monday too  I communicated with a hospcie doc today about her getting subcut fluids in hospice, would they do that; he thought so but was going to confirm by the end of the week.  I think if she doesn't pursue chemo we should get her fluids in hospice subcut; it's far more convenient and comfortable than a PICC anyway.  If she does pursue chemo probably she'll get central access and IVF at home should be able to be arranged as long as her PO intake is so poor. I communicated with  Dr Rangel today about this discussion I had with her.     Cancer  She sees Dr Rangel here 4/1 and the [facility] doc next week too.  I had a long and wide ranging discussion with them.  I told them I worry she has a very aggressive pancreatic cancer: her sx onset has been relatively rapid, she already is having sig issues with PO intake, PE, splenic infarct, very high CA 19.9 suggesting high metabolic activity for her cancer. While I do not ever know for sure, I cautioned her she may only live a couple months with this, and she should plan her priorities in light of that. If she is able to receive chemo that is successful in treating the cancer, obviously that may well add many months to her life.   I would recommend hospice enrollment soon if she doesn't pursue anticancer tx    Follow-up 2 weeks    100 minutes spent on the date of the encounter doing chart review, history and exam, patient education & counseling, documentation and other activities as noted above.    Thank you for involving us in the patient's care.   Hunter Schmidt MD / Palliative Medicine / Text me via [employer]  This note may have been composed with voice recognition software and there may be mistranscriptions.

## 2024-03-27 NOTE — LETTER
"3/27/2024       RE: Abigail Lam  [address]  [facility] [id number]         Dear Colleague,    Thank you for referring your patient, [facility]ail Lam[facility]. Please see a copy of my visit note below.    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  3/2024 found to have metastatic pancreatic adenocarcinoma  3/24-26th admitted with bilateral PEs     Social - Lives with  Luis F. 2 children; grandchildren     Care Planning -       History:   History gathered today from: patient, family/loved ones, medical chart  [place] with her    Just was in the hospital for PEs, minimal PO intake.  Now on Eliquis    Back home: eating and drinking minimally; asking for fluids.  We set some up for tomorrow for her.     Pain remains severe  Thinks Butrans 5 is helping  Oxycodone--can't tolerate (nausea).     Nausea remains severe  Hasn't tried olanzapine 2.5 mg; wants to talk with her psychiatrist about this first; sees her tomorrow.  Zofran too drying    Constipation severe; on senna, can't manage PEG 2/2 oral volume, doing suppositories and enemas    We talked a lot about her shock, grief.  She is doubtful she'd do any treatment but is still not sure; gathering info; sees DR Rangel and a pancreatic oncologist at [facility] on video next week.  Her biggest priority is qol; she'd contemplate some anticancer tx if it was going to allow her qol to be acceptable.     Anxiety--long standing; wants to switch from clonazepam to ativan--works better. \"I am going to be having panic attacks I assume because I'm dying and I think ativan is better " "for me for that.\"    PE: There were no vitals taken for this visit.   Wt Readings from Last 3 Encounters:   03/26/24 39.5 kg (87 lb)   03/22/24 38.8 kg (85 lb 9.6 oz)   03/20/24 39.5 kg (87 lb)     Alert NAD      Data reviewed:  I reviewed recent labs and imaging, my comments:  Na 134  AlkP 425  CA 19.9 320k  Bili 0.7    IMPRESSION:   1.  As described on recent PET/CT, there is a mass in the pancreatic  tail abutting the posterior gastric wall with numerous metastatic  lesions to the liver, overall similar in appearance to recent study.  2.  Findings consistent with peritoneal carcinomatosis also present as  described on PET/CT with increase in size and distribution of the  nodules and increasing volume of ascites.  3.  Wall thickening involving small and large bowel loops may be due  to serosal peritoneal metastatic disease or may be reactive with  overall third spacing of fluid. Correlation with any GI symptoms for  enterocolitis.  4.  Splenic infarcts are demonstrated, new from PET/CT. Chronic  attenuation/thrombosis of the splenic vein with collateralization.  5.  A few enlarged portal nodes may be reactive versus metastatic.  There are also some gastrohepatic ligament nodes suspected. Small  mesenteric nodes versus peritoneal deposits.  6.  No definite CT correlate identified for osseous metastatic lesion  in the sacrum PET/CT.  7.  Please see separate CT chest.     database reviewed:       Impression & Recommendations:  67 yo with recently diagnosed metastatic pancreatic cancer    Pain  Increase Butrans to 10 mcg/hr    Nausea  Encouraged her to try the Zyprexa    Constipation  Let's add lubiprostone    Anxiety  Ok to switch clonazepam to ativan    Dehydration; poor PO intake  We arranged IVF for her tomorrow; will try to get them Monday too  I communicated with a hospcie doc today about her getting subcut fluids in hospice, would they do that; he thought so but was going to confirm by the end of the " week.  I think if she doesn't pursue chemo we should get her fluids in hospice subcut; it's far more convenient and comfortable than a PICC anyway.  If she does pursue chemo probably she'll get central access and IVF at home should be able to be arranged as long as her PO intake is so poor. I communicated with Dr Rangel today about this discussion I had with her.     Cancer  She sees Dr Rangel here 4/1 and the [facility] doc next week too.  I had a long and wide ranging discussion with them.  I told them I worry she has a very aggressive pancreatic cancer: her sx onset has been relatively rapid, she already is having sig issues with PO intake, PE, splenic infarct, very high CA 19.9 suggesting high metabolic activity for her cancer. While I do not ever know for sure, I cautioned her she may only live a couple months with this, and she should plan her priorities in light of that. If she is able to receive chemo that is successful in treating the cancer, obviously that may well add many months to her life.   I would recommend hospice enrollment soon if she doesn't pursue anticancer tx    Follow-up 2 weeks    100 minutes spent on the date of the encounter doing chart review, history and exam, patient education & counseling, documentation and other activities as noted above.    Thank you for involving us in the patient's care.   Hunter Schmidt MD / Palliative Medicine / Text me via [employer]  This note may have been composed with voice recognition software and there may be mistranscriptions.      Again, thank you for allowing me to participate in the care of your patient.      Sincerely,    Hunter Schmidt MD

## 2024-03-27 NOTE — PROGRESS NOTES
[facility]: [facility]    Background: Transitional Care Management program identified per system criteria and reviewed by [facility] team for possible outreach.    Assessment: Upon chart review, [facility] Team member will not proceed with patient outreach related to this episode of Transitional Care Management program due to reason below:    Patient has a follow up appointment with an appropriate provider today for hospital discharge    Plan: Transitional Care Management episode addressed appropriately per reason noted above.      ROCIO Victoria  [facility]    *[facility]cted Care Resource Team does NOT follow patient ongoing. Referrals are identified based on internal discharge reports and the outreach is to ensure patient has an understanding of their discharge instructions.

## 2024-03-28 NOTE — PROGRESS NOTES
Infusion Nursing Note:  Abigail Lam presents today for 2 liters NS.    Patient seen by provider today: No   present during visit today: Not Applicable.    Note: Patient arrives feeling very weak and complains of chest, abdomen, shoulder, and back pain. She was just discharged from the hospital and was diagnosed with bilateral PEs and recently found to have metastatic pancreatic edenocarcinoma. She states that the pain came up very sudden and unexpected while she was traveling in [place] with her . She had to cut her trip short a week and come home. She has no energy, no appetite. She has been trying to drink fluids but nothing tastes good. She is here for 2 liters of NS to hydrate today. Hot packs given for patients back and shoulder pain.     Pt and  would like IVF again before Monday if possible. Secure chat sent to Dr. Schmidt and family also plans to follow up with him as patient has not established care with an oncologist yet.       Intravenous Access:  Peripheral IV placed by vascular access.    Treatment Conditions:  Not Applicable.      Post Infusion Assessment:  Patient tolerated infusion without incident.  Blood return noted pre and post infusion.  Site patent and intact, free from redness, edema or discomfort.  No evidence of extravasations.  Access discontinued per protocol.       Discharge Plan:   Patient declined prescription refills.  Patient and/or family verbalized understanding of discharge instructions and all questions answered.  AVS to patient via [employer].  Patient will return 4/1/24 to see Dr. Rangel for next appointment.   Patient discharged in stable condition accompanied by: .  Departure Mode: Ambulatory.      Jessica Wheeler RN

## 2024-03-28 NOTE — TELEPHONE ENCOUNTER
Retail Pharmacy Prior Authorization Team   Phone: [phone]    PA Initiation    Medication: LUBIPROSTONE 24 MCG PO CAPS  Insurance Company: [employer] - Phone [phone] Fax [phone]  Pharmacy Filling the Rx: [id number] [place], [place] - [address]  Filling Pharmacy Phone:    Filling Pharmacy Fax:    Start Date: 3/28/2024

## 2024-03-28 NOTE — PATIENT INSTRUCTIONS
[facility] Triage and after hours / weekends / holidays:  [phone]    Please call the triage or after hours line if you experience a temperature greater than or equal to 100.4, shaking chills, have uncontrolled nausea, vomiting and/or diarrhea, dizziness, shortness of breath, chest pain, bleeding, unexplained bruising, or if you have any other new/concerning symptoms, questions or concerns.      If you are having any concerning symptoms or wish to speak to a provider before your next infusion visit, please call triage to notify them so we can adequately serve you.     If you need a refill on a narcotic prescription or other medication, please call before your infusion appointment.                March 2024 Sunday Monday Tuesday Wednesday Thursday Friday Saturday                            1     2       3     4     5     6     7     8    RETURN CCSL  12:15 PM   (30 min.)   Lefty Matthew MD   [facility]    CT CHEST WWO   1:45 PM   (20 min.)   UCSCCT2   [facility] [place]    XR RIBS LEFT 2 VIEWS   5:05 PM   (20 min.)   UCSCXR1   [facility] [place]    XR CHEST 2 VIEWS   5:30 PM   (20 min.)   UCSCXR1   [facility] [place]    XR THORACIC SPINE 3 VIEWS   5:50 PM   (20 min.)   [facility]   [facility] [place] 9       10     11     12    Admission   7:25 AM   Reinaldo Mott MD   [facility] Unit [address] [place] [address]   (Discharge: 3/12/2024)    PROCEDURE - 6.5 HR   7:30 AM   (390 min.)   U2A ROOM 8   [facility] [address] [place] [address]    IR LIVER BIOPSY PERCUTANEOUS   7:30 AM   (90 min.)   UUIR6   [facility] Interventional Radiology 13     14     15    BREAST TUMOR CONFERENCE   7:30 AM   (10 min.)   BREAST TUMOR CONFERENCE   [facility] Tumor Conference Virtual Scheduling    PE [facility]/ ONCOLOGY   7:45 AM   (45 min.)   [facility] PET CT 1   M  [facility]    CT CHEST/ABDOMEN/PELVIS W   8:30 AM   (20 min.)   [facility] PET CT 1   M [facility]    RETURN CCSL  10:45 AM   (60 min.)   Lefty Matthew MD   [facility]    NEW PALLIATIVE  12:45 PM   (80 min.)   Hunter Schmidt MD   [facility] 16       17     18     19    LAB  10:15 AM   (15 min.)   UC LAB   [facility] [place] 20    RETURN CCSL   8:45 AM   (30 min.)   Lefty Matthew MD   [facility] 21     22    NEW ONCOLOGY  12:45 PM   (60 min.)   Jonathan De La Gazra MD   [facility] 23       24    Admission   4:14 PM   Shubham Tomas MD   [facility] 5A Oncology   (Discharge: 3/26/2024)    XR CHEST 2 VIEWS   4:45 PM   (20 min.)   UUXR3   [facility] Imaging    CT ABDOMEN PELVIS W   6:55 PM   (20 min.)   UUCT4   [facility]    CT CHEST PULMONARY EMBOLISM W   7:05 PM   (20 min.)   UUCT4   [facility] Imaging 25    ECHO COMPLETE   7:45 AM   (60 min.)   [id number]   [facility] 26    IP PT EVALUATION  10:00 AM   (45 min.)   Brigette Arechiga PT   [facility] Rehabilitation 27    RETURN CCSL   3:50 PM   (40 min.)   Hunter Schmidt MD   [facility] 28    ONC INFUSION 3 HR (180 MIN)   7:30 AM   (180 min.)    ONC INFUSION NURSE   [facility] 29     30       31 April 2024 Sunday Monday Tuesday Wednesday Thursday Friday Saturday        1    RETURN CCSL   6:45 AM   (60 min.)   Preet Rangel MD   [facility]    NEW ONCOLOGY   1:00 PM   (60 min.)   Ludy Barnes GC   [facility] 2     3     4     5     6       7     8     9     10     11     12     13        14     15     16     17     18     19     20       21     22     23     24     25     26     27       28     29     30                                         Lab Results:  No results found for this or any previous visit (from the past 12 hour(s)).

## 2024-03-29 NOTE — TELEPHONE ENCOUNTER
Received telephone call from patient's  requesting refill of oxycodone. Pt and  called this morning and reported a very difficult night with pain control. They requested an urgent hospice referral through [facility]. The hospice team met with them at 1 PM today. Luis F called me back after the hospice team left. Pt elected not to enroll today.    Last refill: 3/11/24  Last office visit: 3/27/24  Scheduled for follow up per check out request, planned in 2 weeks     Will route request to MD for review.     Reviewed [place]  Report.

## 2024-03-29 NOTE — TELEPHONE ENCOUNTER
PRIOR AUTHORIZATION DENIED    Medication: LUBIPROSTONE 24 MCG PO CAPS  Insurance Company: [employer] - Phone [phone] Fax [phone]  Denial Date: 3/28/2024  Denial Reason(s):       Appeal Information:   Must be initiated by patient. She can submit her letter to clinic to be copied into Letters tab and I can submit it, but it must be signed by her, and give permission for clinic to submit it on her behalf.

## 2024-04-01 NOTE — NURSING NOTE
Is the patient currently in the state of [place]? YES    Visit mode:VIDEO    If the visit is dropped, the patient can be reconnected by: VIDEO VISIT: Text to cell phone:   Telephone Information:   Mobile [phone]       Will anyone else be joining the visit? Yes,  Luis F  (If patient encounters technical issues they should call [phone] :[phone])    How would you like to obtain your AVS? MyChart    Are changes needed to the allergy or medication list? N/A    Are refills needed on medications prescribed by this physician? N/A    Reason for visit: Consult    ANA HENLEY

## 2024-04-01 NOTE — PROGRESS NOTES
4/1/2024    Virtual Visit Details  Type of service:  Video Visit   Video Start Time:  1:07pm  Video End Time: 1:29pm  Originating Location (pt. Location): Home  Distant Location (provider location):  Off-site  Platform used for Video Visit: Denise    Referring Provider: Jonathan De La Garza MD    Presenting Information:   Today Abigail and her  Kevin elected for a virtual genetic counseling visit through the Cancer Risk Management Program to discuss Abigail's personal and family history of cancer. We reviewed this history, cancer screening recommendations, and available genetic testing options.    Updated Personal History:  Abigail is a 68 year old female. She was first diagnosed with invasive ductal carcinoma with DCIS  (ER/WA+, Her2-) of her right breast around age 50; treatment included a bilateral mastectomy and several years of endocrine therapy. She has recently been diagnosed with metastatic pancreatic adenocarcinoma at age 68 and she is currently treatment versus hospice care. Of note, [facility] tumor testing performed on the pancreatic tumor identified KRAS variant p.G12D (43% variant allele frequency), APC variant c.3920T>A (47% variant allele frequency), and TP53 variant c.818G>A (40% variant allele frequency).    Abigail had a complete hysterectomy several years ago. Her most recent colonoscopy in April 2017 was normal and follow-up was recommended in 10 years. She does not regularly do any other cancer screening at this time.    Of note, Abigail has pursued genetic testing several times since her breast cancer diagnosis. Most recently, she met with Lizette Lim in December 2015 and pursued genetic testing using the BRCAplus panel offered by [facility]. No harmful mutations were identified in the following six genes: BRCA1, BRCA2, CDH1, PTEN, TP53, and PALB2. Given her most recent cancer diagnosis, Dr. Matthew recommended updated genetic testing through the [facility], but  "this testing was cancelled due to the anticipated high out of pocket cost through her insurance company.    Updated Family History: (Please see scanned pedigree for detailed family history information)  Abigail's mother was diagnosed with breast cancer at age 75 and passed away at age 76.  Of note, she had no siblings and limited information is available regarding her parents.  Abigail's maternal great grandmother (maternal grandmother's mother) was diagnosed with pancreatic cancer at an unknown age and passed away.  Abigail's father was diagnosed with bladder cancer in his 70's and passed away at age 85.  Abigail's paternal aunt was diagnosed with lung cancer and passed away in her 70's; she did not have a history of smoking.  Abigail reports that she is of Ashkenazi Hoahaoism ancestry on both sides of her family.    Discussion:  We reviewed the features of sporadic, familial, and hereditary cancers. In looking at Abigail's maternal family history, it is possible that a cancer susceptibility gene is present as Abigail, her mother, and her great grandmother were diagnosed with related cancers in multiple generations; Abigail has also been diagnosed with two primary cancers.  We discussed the natural history and genetics of hereditary breast and/or pancreatic cancer.   We discussed that though her prior testing included six genes that are often considered the most high risk for breast cancer, there are additional genes that could cause increased risk for breast and/or pancreatic cancer that were not included in her prior testing. For example, mutations in the SHREYAS gene are associated with increased risk for female breast, pancreatic, ovarian, and potentially other cancers.   We also briefly reviewed her [facility] tumor testing report. Though this testing is designed to identify somatic (i.e. acquired) genetic changes in a tumor that may guide treatment decisions, it can also \"incidentally\" identify germline (i.e. inherited) gene mutations. " Abigail's tumor was found to carry a specific mutation in the APC gene called c.3920T>A (also known as p.[id number]). This specific APC mutation is commonly found in individuals of Ashkenazi Denominational ancestry and does not cause classic Familial Adenomatous Polyposis, but instead a moderately increased risk for colon cancer. Taken together, it is very likely that this APC mutation is germline in Abigail, though this would need to be confirmed with germline genetic testing.  As many of these and other genes present with overlapping features in a family and accurate cancer risk cannot always be established based upon the pedigree analysis alone, it would be reasonable for Abigail to consider expanded genetic testing to analyze additional genes.  Based on her personal and family history, Abigail meets current National Comprehensive Cancer Network (NCCN) criteria for genetic testing of the APC gene and high penetrance pancreatic cancer genes (i.e. SHREYAS, CDKN2A, Browning syndrome genes, STK11, etc.).  A detailed handout regarding these genes/syndromes and the information we discussed was provided to Abigail at the end of our appointment today and can be found in the after visit summary. Topics included: inheritance pattern, cancer risks, cancer screening recommendations, and also risks, benefits and limitations of testing.  Abigail and her  explained that they would be interested in gathering as much information as possible from genetic testing. As such, we discussed the [employer] Multi-Cancer Panel + Preliminary Evidence Breast/Pancreatic Cancer Genes.  This panel analyzes 75 genes associated with increased risk for cancer: AIP, ALK, APC, SHREYAS, AXIN2, BAP1, BARD1, BLM, BMPR1A, BRCA1, BRCA2, BRIP1, CDC73, CDH1, CDK4, CDKN1B, CDKN2A, CHEK2, CTNNA1, DICER1, EGFR, EPCAM, FH, FLCN, GREM1, HOXB13, KIT, LZTR1, MAX, MBD4, MEN1, MET, MITF, MLH1, MSH2, MSH3, MSH6, MUTYH, NF1, NF2, NTHL1, PALB2, PDGFRA, PMS2, POLD1, POLE, POT1, ETYQT6I, PTCH1,  PTEN, RAD51C, RAD51D, RB1, RET, SDHA, SDHAF2, SDHB, SDHC, SDHD, SMAD4, SMARCA4, SMARCB1, SMARCE1, STK11, SUFU, HKNS066, TP53, TSC1, TSC2, VHL, FANCC, FANCM, NBN, RECQL, PALLD.  Consent was obtained over the video and she elected to schedule a lab appointment at her earliest convenience. Once her blood is drawn, it will be sent to [facility] for testing. Of note, testing will begin with the APC and SHREYAS genes with reflex to the complete panel. Turn around time: 2-3 weeks [facility].  Medical Management: For Abigail, we reviewed that the information from genetic testing may determine:  additional cancer screening for which Abigail and her relatives may qualify (i.e. regular pancreatic imaging, mammogram and breast MRI, more frequent colonoscopies, more frequent dermatologic exams, etc.),  options for risk reducing surgeries Abigail and her relatives could consider (i.e. bilateral mastectomy, surgery to remove ovaries and/or uterus, etc.),    and targeted chemotherapies for Abigail's active cancer, or if she were to develop certain cancers in the future (i.e. immunotherapy for individuals with Browning syndrome, PARP inhibitors, etc.).   These recommendations and possible targeted chemotherapies will be discussed in detail once genetic testing is completed.     Plan:  1) Today Abigail elected to proceed with testing of the APC and SHREYAS genes, with reflex to the [employer] Multi-Cancer Panel + Preliminary Evidence Breast/Pancreatic Cancer Genes, through [facility]. Abigail will schedule a lab appointment at her earliest convenience.  2) The results should be available 2-3 weeks after [facility] receives her blood sample.  3) Abigail will be contacted to schedule a virtual return visit with me to discuss the results. She also requested that I share the results with her  Kevin, if she is not available.    Ludy Barnes MS, [facility]  Licensed, Certified Genetic Counselor  Office:  [phone]  [email or web address]

## 2024-04-01 NOTE — TELEPHONE ENCOUNTER
"Writer received phone call from patient's  Kevin.  Reports before patient enrolls in hospice she would like to try the \"nerve block\" that Dr. Schmidt discussed with her at one of the first visits she had with him.  Patient met with Dr. Rangel today but did not mention this to him.    Dr. Schmidt is out of the clinic this week, so writer will route message to Dr. Rangel to review.    Kevin verbalized understanding and had no further questions.    Sandra Lewis RN  Palliative Care Nurse Clinician    [phone] (Direct)  [phone] (Main)  [phone] (Appointment Scheduling)      ADDENDUM:  Per the request of patient's  Kevin, message left on his voicemail letting him know that Dr. Rangel is okay with patient moving forward with nerve block.  Writer left GIs scheduling number on his voicemail to call and schedule.    Invited Loki to call back with any questions.      ADDENDUM:  Writer received phone call from patient's  Kevin reporting patient is scheduled for nerve block on April 16 at 11:30 AM.  Patient was placed on Eliquis for pulmonary embolisms during last hospital stay.  Per Kevin, patient needs guidance for tapering off Eliquis before procedure.    Writer encouraged Kevin to call patient's PCP for guidance and he said he tried to get an appointment with her and she has no openings for several months.  He is wondering if Dr. Rangel will give guidance on the Eliquis.    Patient is requesting a call from Dr. Rangel's nurse to discuss labs patient needs per genetics.    Will route message to Dr. Rangel and Dr. Rangel's nurse Cathy Staley.    Kevin verbalized understanding and had no further questions.  "

## 2024-04-01 NOTE — PROGRESS NOTES
Infusion Nursing Note:  Abigail Lam presents today for scheduled infusion.    Patient seen by provider today: Yes: Dr. Rangel   present during visit today: Not Applicable.    Note: Pt assessed by provider prior to infusion. Pt reports 6/10 right shoulder pain that started overnight. Pt took 5 mg Oxycodone and 1000mg Tylenol this AM, but was not effective. Also tried heat/ice packs and positioning in infusion w/ little relief. Dr. Rangel notified and Dilaudid ordered. Pt received 2L 0.9% NS and 0.5 mg Dilaudid IVP. Pt reported Dilaudid was not effective either, but after getting up and moving pt reported slight improvement in pain.       Intravenous Access:  Peripheral IV placed by vascular access.    Treatment Conditions:  Not Applicable.      Post Infusion Assessment:  Patient tolerated infusion without incident.  Blood return noted pre and post infusion.  Site patent and intact, free from redness, edema or discomfort.  No evidence of extravasations.  Access discontinued per protocol.       Discharge Plan:   Patient discharged in stable condition accompanied by:  and son.  Departure Mode: Ambulatory.      Patty Vogel RN

## 2024-04-01 NOTE — NURSING NOTE
"Oncology Rooming Note    April 1, 2024 7:08 AM   Abigail Lam is a 68 year old female who presents for:    Chief Complaint   Patient presents with    Oncology Clinic Visit     Pancreatic cancer metastasized to liver      Initial Vitals: BP (!) 142/83   Pulse 110   Temp 97.8  F (36.6  C) (Oral)   Resp 16   Wt 37.8 kg (83 lb 4.8 oz)   SpO2 100%   BMI 15.49 kg/m   Estimated body mass index is 15.49 kg/m  as calculated from the following:    Height as of 3/27/24: 1.562 m (5' 1.5\").    Weight as of this encounter: 37.8 kg (83 lb 4.8 oz). Body surface area is 1.28 meters squared.  Severe Pain (6) Comment: Data Unavailable   No LMP recorded. Patient is postmenopausal.  Allergies reviewed: Yes  Medications reviewed: Yes    Medications: Medication refills not needed today.  Pharmacy name entered into [email or web address]:    [facility] - [place], [place] - [address] [id number] - [place], [place] - [address]  [id number] - CLOSED - [place], [place] - [address] [id number] - [place], [place] - [address]    Frailty Screening:   Is the patient here for a new oncology consult visit in cancer care? 2. No      Clinical concerns: None       Consuelo Vivar CMA            "

## 2024-04-01 NOTE — LETTER
4/1/2024       RE: Abigail Lam  [address] [place] [facility] [address]      Dear Colleague,    Thank you for referring your patient, Abigail Lam, to the [facility]. Please see a copy of my visit note below.    4/1/2024    Virtual Visit Details  Type of service:  Video Visit   Video Start Time:  1:07pm  Video End Time: 1:29pm  Originating Location (pt. Location): Home  Distant Location (provider location):  Off-site  Platform used for Video Visit: [facility]    Referring Provider: Jonathan De La Garza MD    Presenting Information:   Today Abigail and her  Kevin elected for a virtual genetic counseling visit through the Cancer Risk Management Program to discuss Abigail's personal and family history of cancer. We reviewed this history, cancer screening recommendations, and available genetic testing options.    Updated Personal History:  Abigail is a 68 year old female. She was first diagnosed with invasive ductal carcinoma with DCIS  (ER/MA+, Her2-) of her right breast around age 50; treatment included a bilateral mastectomy and several years of endocrine therapy. She has recently been diagnosed with metastatic pancreatic adenocarcinoma at age 68 and she is currently treatment versus hospice care. Of note, Caris tumor testing performed on the pancreatic tumor identified KRAS variant p.G12D (43% variant allele frequency), APC variant c.3920T>A (47% variant allele frequency), and TP53 variant c.818G>A (40% variant allele frequency).    Abigail had a complete hysterectomy several years ago. Her most recent colonoscopy in April 2017 was normal and follow-up was recommended in 10 years. She does not regularly do any other cancer screening at this time.    Of note, Abigail has pursued genetic testing several times since her breast cancer diagnosis. Most recently, she met with Lizette Lim in December 2015 and pursued genetic testing using the BRCAplus panel offered by [employer]. No harmful  mutations were identified in the following six genes: BRCA1, BRCA2, CDH1, PTEN, TP53, and PALB2. Given her most recent cancer diagnosis, Dr. Matthew recommended updated genetic testing through the [facility], but this testing was cancelled due to the anticipated high out of pocket cost through her insurance company.    Updated Family History: (Please see scanned pedigree for detailed family history information)  Abigail's mother was diagnosed with breast cancer at age 75 and passed away at age 76.  Of note, she had no siblings and limited information is available regarding her parents.  Abigail's maternal great grandmother (maternal grandmother's mother) was diagnosed with pancreatic cancer at an unknown age and passed away.  Abigail's father was diagnosed with bladder cancer in his 70's and passed away at age 85.  Abigail's paternal aunt was diagnosed with lung cancer and passed away in her 70's; she did not have a history of smoking.  Abigail reports that she is of Ashkenazi [place] ancestry on both sides of her family.    Discussion:  We reviewed the features of sporadic, familial, and hereditary cancers. In looking at Abigail's maternal family history, it is possible that a cancer susceptibility gene is present as Abigail, her mother, and her great grandmother were diagnosed with related cancers in multiple generations; Abigail has also been diagnosed with two primary cancers.  We discussed the natural history and genetics of hereditary breast and/or pancreatic cancer.   We discussed that though her prior testing included six genes that are often considered the most high risk for breast cancer, there are additional genes that could cause increased risk for breast and/or pancreatic cancer that were not included in her prior testing. For example, mutations in the SHREYAS gene are associated with increased risk for female breast, pancreatic, ovarian, and potentially other cancers.   We also briefly  "reviewed her Caris tumor testing report. Though this testing is designed to identify somatic (i.e. acquired) genetic changes in a tumor that may guide treatment decisions, it can also \"incidentally\" identify germline (i.e. inherited) gene mutations. Abigail's tumor was found to carry a specific mutation in the APC gene called c.3920T>A (also known as p.[id number]). This specific APC mutation is commonly found in individuals of Ashkenazi [place] ancestry and does not cause classic Familial Adenomatous Polyposis, but instead a moderately increased risk for colon cancer. Taken together, it is very likely that this APC mutation is germline in Abigail, though this would need to be confirmed with germline genetic testing.  As many of these and other genes present with overlapping features in a family and accurate cancer risk cannot always be established based upon the pedigree analysis alone, it would be reasonable for Abigail to consider expanded genetic testing to analyze additional genes.  Based on her personal and family history, Abigail meets current National Comprehensive Cancer Network (NCCN) criteria for genetic testing of the APC gene and high penetrance pancreatic cancer genes (i.e. SHREYAS, CDKN2A, Browning syndrome genes, STK11, etc.).  A detailed handout regarding these genes/syndromes and the information we discussed was provided to Abigail at the end of our appointment today and can be found in the after visit summary. Topics included: inheritance pattern, cancer risks, cancer screening recommendations, and also risks, benefits and limitations of testing.  Abigail and her  explained that they would be interested in gathering as much information as possible from genetic testing. As such, we discussed the [facility] Multi-Cancer Panel + Preliminary Evidence Breast/Pancreatic Cancer Genes.  This panel analyzes 75 genes associated with increased risk for cancer: AIP, ALK, APC, SHREYAS, AXIN2, BAP1, BARD1, BLM, BMPR1A, BRCA1, " BRCA2, BRIP1, CDC73, CDH1, CDK4, CDKN1B, CDKN2A, CHEK2, CTNNA1, DICER1, EGFR, EPCAM, FH, FLCN, GREM1, HOXB13, KIT, LZTR1, MAX, MBD4, MEN1, MET, MITF, MLH1, MSH2, MSH3, MSH6, MUTYH, NF1, NF2, NTHL1, PALB2, PDGFRA, PMS2, POLD1, POLE, POT1, NBHSI4I, PTCH1, PTEN, RAD51C, RAD51D, RB1, RET, SDHA, SDHAF2, SDHB, SDHC, SDHD, SMAD4, SMARCA4, SMARCB1, SMARCE1, STK11, SUFU, GBHP885, TP53, TSC1, TSC2, VHL, FANCC, FANCM, NBN, RECQL, PALLD.  Consent was obtained over the video and she elected to schedule a lab appointment at her earliest convenience. Once her blood is drawn, it will be sent to "[facility] for testing. Of note, testing will begin with the APC and SHREYAS genes with reflex to the complete panel. Turn around time: 2-3 weeks after [facility] receives her blood sample.  Medical Management: For Abigail, we reviewed that the information from genetic testing may determine:  additional cancer screening for which Abigail and her relatives may qualify (i.e. regular pancreatic imaging, mammogram and breast MRI, more frequent colonoscopies, more frequent dermatologic exams, etc.),  options for risk reducing surgeries Abigail and her relatives could consider (i.e. bilateral mastectomy, surgery to remove ovaries and/or uterus, etc.),    and targeted chemotherapies for Abigail's active cancer, or if she were to develop certain cancers in the future (i.e. immunotherapy for individuals with Browning syndrome, PARP inhibitors, etc.).   These recommendations and possible targeted chemotherapies will be discussed in detail once genetic testing is completed.     Plan:  1) Today Abigail elected to proceed with testing of the APC and SHREYAS genes, with reflex to the [facility] Multi-Cancer Panel + Preliminary Evidence Breast/Pancreatic Cancer Genes, through "[facility]. Abigail will schedule a lab appointment at her earliest convenience.  2) The results should be available 2-3 weeks after [facility] receives her blood sample.  3) Abigail will be contacted  to schedule a virtual return visit with me to discuss the results. She also requested that I share the results with her  Kevin, if she is not available.    Ludy Barnes MS, [employer]  Licensed, Certified Genetic Counselor  Office: [phone]  [email or web address]

## 2024-04-01 NOTE — TELEPHONE ENCOUNTER
"Endoscopy Scheduling Screen    Have you had a positive Covid test in the last 14 days?  No    What is your communication preference for Instructions and/or Bowel Prep?   MyChart    What insurance is in the chart?  Other:  medicare/bcbs fed     Ordering/Referring Provider: Liam Das MD     (If ordering provider performs procedure, schedule with ordering provider unless otherwise instructed. )    BMI: Estimated body mass index is 15.49 kg/m  as calculated from the following:    Height as of 3/27/24: 1.562 m (5' 1.5\").    Weight as of an earlier encounter on 4/1/24: 37.8 kg (83 lb 4.8 oz).     Sedation Ordered  MAC/deep sedation.   BMI<= 45 45 < BMI <= 48 48 < BMI < = 50  BMI > 50   No Restrictions No [facility] ASC  No [facility]  [facility] with exceptions Hospital Only OR Only       Do you have a history of malignant hyperthermia?  No    (Females) Are you currently pregnant?   No     Have you been diagnosed or told you have pulmonary hypertension?   No    Do you have an LVAD?  No    Have you been told you have moderate to severe sleep apnea?  No    Have you been told you have COPD, asthma, or any other lung disease?  No    Do you have any heart conditions?  No     Have you ever had or are you waiting for an organ transplant?  No. Continue scheduling, no site restrictions.    Have you had a stroke or transient ischemic attack (TIA aka \"mini stroke\" in the last 6 months?   No    Have you been diagnosed with or been told you have cirrhosis of the liver?   No    Are you currently on dialysis?   No    Do you need assistance transferring?   No    BMI: Estimated body mass index is 15.49 kg/m  as calculated from the following:    Height as of 3/27/24: 1.562 m (5' 1.5\").    Weight as of an earlier encounter on 4/1/24: 37.8 kg (83 lb 4.8 oz).     Is patients BMI > 40 and scheduling location [facility]?  No    Do you take an injectable medication for weight loss or diabetes (excluding insulin)?  No    Do you take the medication " Naltrexone?  No    Do you take blood thinners?  Yes  - Eliquist     Are you taking Effient/Prasugrel?  No, you must contact your prescribing provider for direction on holding or bridging with a different medication.       Prep   Are you currently on dialysis or do you have chronic kidney disease?  No    Do you have a diagnosis of diabetes?  No    Do you have a diagnosis of cystic fibrosis (CF)?  No    On a regular basis do you go 3 -5 days between bowel movements?  Yes (Extended Prep)    BMI > 40?  No    Preferred Pharmacy:      [id number] - [place], [place] - [address]  [address] [facility] [address]  Phone: [phone] Fax: [phone]      Final Scheduling Details     Procedure scheduled  Endoscopic ultrasound (EUS)    Surgeon:  Thiago      Date of procedure:  04/16/2024     Pre-OP / PAC:   No - Not required for this site.    Location  [facility] - Per order.    Sedation   MAC/Deep Sedation - Per order.      Patient Reminders:   You will receive a call from a Nurse to review instructions and health history.  This assessment must be completed prior to your procedure.  Failure to complete the Nurse assessment may result in the procedure being cancelled.      On the day of your procedure, please designate an adult(s) who can drive you home stay with you for the next 24 hours. The medicines used in the exam will make you sleepy. You will not be able to drive.      You cannot take public transportation, ride share services, or non-medical taxi service without a responsible caregiver.  Medical transport services are allowed with the requirement that a responsible caregiver will receive you at your destination.  We require that drivers and caregivers are confirmed prior to your procedure.

## 2024-04-02 NOTE — PATIENT INSTRUCTIONS
Conditions discussed today:  SHREYAS gene mutations  APC gene mutations    Assessing Cancer Risk  Only about 5-10% of cancers are thought to be due to an inherited cancer susceptibility gene.    These families often have:  Several people with the same or related types of cancer  Cancers diagnosed at a young age (before age 50)  Individuals with more than one primary cancer  Multiple generations of the family affected with cancer    Genetic Testing  Genetic testing involves a blood or saliva test and will look at the genetic information in select genes for any harmful mutations that are associated with increased cancer risk.  If possible, it is recommended that the person(s) who has had cancer be tested before other family members.  That person will give us the most useful information about whether or not a specific gene is associated with the cancer in the family.     Results  There are three possible results of genetic testing:  Positive--a harmful mutation was identified  Negative--no mutation was identified  Variant of unknown significance--a variation in one of the genes was identified, but it is unclear how this impacts cancer risk in the family    Advantages and Disadvantages  There are advantages and disadvantages to genetic testing.    Advantages  May clarify your cancer risk  Can help you make medical decisions  May explain the cancers in your family  May give useful information to your family members (if you share your results)    Disadvantages  Possible negative emotional impact of learning about inherited cancer risk  Uncertainty in interpreting a negative test result in some situations  Possible genetic discrimination concerns (see below)    Inheritance   Mutations in most cancer risk genes are inherited in an autosomal dominant pattern.  This means that if a parent has a mutation, each of their children will have a 50% chance of inheriting that same mutation.  Therefore, each child would have a 50%  chance of being at increased risk for developing cancer.                                              Image obtained from Genetics Home Reference, 2013     Genetic Information Nondiscrimination Act (TRAVON)  TRAVON is a federal law that protects individuals from health insurance or employment discrimination based on a genetic test result alone.  Although rare, there are currently no legal protections in terms of life insurance, long term care, or disability insurances.  Visit the National Human Genome Research [place] at Genome.gov/14997324 to learn more.    Reducing Cancer Risk  If a harmful mutation is found in a cancer risk gene, there may be certain screening tests or preventative surgeries that can be offered. This information will be discussed after genetic testing is completed. If no mutations are found on genetic testing, screening is then recommended based on personal and/or family history of cancer.     Questions to Think About Regarding Genetic Testing  What effect will the test result have on me and my relationship with my family members if I have an inherited gene mutation?  If I don t have a gene mutation?  Should I share my test results, and how will my family react to this news, which may also affect them?  Are my children ready to learn new information that may one day affect their own health?    Resources  American Cancer Society (ACS) cancer.org   National Cancer [place] (NCI) cancer.gov     Please call us if you have any questions or concerns.   Cancer Risk Management Program [phone]-CANCER ([phone])  Augustin Green, MS [facility]  [phone]  Vida Olea, MS, [facility] [phone]  Laisha Reynolds, MS, [facility]  [phone]  Winter Saini, MS, [facility]  [phone]  Cyn Wolf, MS, [facility]  [phone]  Ludy Barnes, MS, [facility] [phone]  Yoon Winchester, MS, [facility] [phone]

## 2024-04-02 NOTE — PROGRESS NOTES
Medical Oncology Visit Note    Ms. Lam is a payton 67 yo woman with remote history of localized breast cancer not on active treatment.  Recent diagnosis of metastatic pancreatic ductal adenocarcinoma with VTE, cancer-associated pain, anorexia/ cachexia.  Significant peritoneal and metastatic disease burden.    She has met with Dr. De La Garza from our med onc team, and also met Dr. Little when hospitalized recently.  Long time patient of Dr. Matthew for breast cancer, and also has been seeing Dr. Schmidt of [facility] since diagnosis.    In person visit today with , Mr. Kerns and son, visiting from [place], [place].    She looks quite uncomfortable today and was writhing in pain at the start of our visit.  Encouraged her to take PO oxycodone which they were carrying, and she felt a little better after that.    We met and chatted for >70 minutes over the diagnosis, stage, what is meant, expected prognosis, treatment options, how chemotherapy works, expected risks, potential benefits.  We discussed how and why pancreatic cancer often causes clots and malnutrition.  We discussed role of germline and somatic testing.    She said multiple times she did not want suffering, especially physical suffering.    She has minimal appetite and appears dehydrated.  We discussed if deploying systemic treatment such as chemo, I would like to start asap and in fact even this week by PIV if needed (gem/ cis or gem/abraxane), without waiting for a port. We have a small window if we are going to treat,    At the end of our discussion, we decided to explore hospice care.  They had met with hospice on Friday last week but ultimately wanted to think more before enrolling.  She is leaning away from systemic cancer-directed treatment.    We arranged for urgent IVF (2L NS) in infusion.  Infusion RN reached out to me and we added IV hydromorphone and ondansetron for comfort.    I received a couple of messages from them after our visit re: EVER  paperwork and that they would like to pursue a celiac neurolysis before considering hospice.  I discussed with the palliative care team and agreed that this might be a good option for pain control.    She had a germline testing appt today.    I called Mr. Kerns at approx 6 pm today and chatted for 15 minutes about next steps, including potentially arranging IVF in the coming days (either Fri this week, or next Monday), holding DOAC 2 days before celiac block, and actively exploring hospice. He requested we try to move up the celiac procedure and I agreed this was important and I messaged the GI team, with no promise re: spots.    I hope she can enroll in hospice asap to get as much comfort and support as possible.      I spent a total of 110 minutes on the date of service including preparation time (e.g., review of records and interpretation of tests), visit time with the patient and care partners, requesting interventions, communicating with other health care professionals, and documentation.        Preet Rangel M.D.   of Medicine  Division of Hematology, Oncology and Transplantation  [facility]

## 2024-04-04 NOTE — TELEPHONE ENCOUNTER
FMLA FAMILY forms filled out and put in providers folder for review and signature.        Bee    152.4

## 2024-04-04 NOTE — TELEPHONE ENCOUNTER
FMLA FAMILY  forms received via EMAIL from [email or web address] (Aretha [email or web address]).      Forms to be completed and put in folder for provider to approve.    Bee

## 2024-04-08 NOTE — TELEPHONE ENCOUNTER
Caller: No call needed    Reason for Reschedule/Cancellation   (please be detailed, any staff messages or encounters to note?): Patient is now in hospice      Prior to reschedule please review:  Ordering Provider: CHAVEZ CLARKE   Sedation Determined: MAC  Does patient have any ASC Exclusions, please identify?: No      Notes on Cancelled Procedure:  Procedure: ENDOSCOPIC ULTRASOUND [EUS]   Date: 4/16/2024  Location: [facility]; [address] [place] [address], [address]rd Floor, [place] [id number]   Surgeon: Thiago      Rescheduled: No, not needed.        Did you cancel or rescheduled an EUS procedure? Yes, Send Inbasket to [employer]. Requested by Ellie via in basket.

## 2024-04-08 NOTE — TELEPHONE ENCOUNTER
[facility] Family paperwork completed, checked for accuracy, signed and e-mailed to [email or web address] A copy was made, sent to renetta Livingston

## 2024-04-08 NOTE — TELEPHONE ENCOUNTER
Sherri Cueto, Preet Arias MD; Sandra Lewis, JOSE; Cathy Staley, RN; P Endoscopy Scheduling Pool  Morning    Sorry to hear this. Scheduling team, ok to cancel the EUS with Dr Das on 4/16. No need to update patient of this cancellation, given Dr Rangel's message below.    Thank you,    lElie

## 2024-04-08 NOTE — TELEPHONE ENCOUNTER
Fmla Family forms received via Email from [email or web address].      Forms to be completed and put in folder for provider to approve.      Bee Livingston

## 2024-04-08 NOTE — TELEPHONE ENCOUNTER
Fmla family forms filled out and put in providers folder for review and signature.      Bee Livingston

## 2024-04-12 NOTE — TELEPHONE ENCOUNTER
[facility] FAMILY paperwork completed, checked for accuracy, signed and e-mailed to [email or web address]      Bee Livingston

## 2024-04-15 DIAGNOSIS — Z80.3 FAMILY HISTORY OF MALIGNANT NEOPLASM OF BREAST: ICD-10-CM

## 2024-04-15 DIAGNOSIS — Z80.0 FAMILY HISTORY OF PANCREATIC CANCER: ICD-10-CM

## 2024-04-15 DIAGNOSIS — C25.1 MALIGNANT NEOPLASM OF BODY OF PANCREAS (H): Primary | ICD-10-CM

## 2024-04-15 DIAGNOSIS — Z85.3 PERSONAL HISTORY OF MALIGNANT NEOPLASM OF BREAST: ICD-10-CM

## 2024-04-16 LAB — SCANNED LAB RESULT: ABNORMAL

## 2024-04-19 ENCOUNTER — TELEPHONE (OUTPATIENT)
Dept: ONCOLOGY | Facility: CLINIC | Age: 69
End: 2024-04-19
Payer: MEDICARE

## 2024-04-19 NOTE — TELEPHONE ENCOUNTER
4/19/2024    I called Abigail's  Kevin today, as Abigail has recently passed away, to discuss Abigail's genetic testing results. As I was unable to reach him, I left a non-detailed voicemail with my name and phone number.    Ludy Barnes MS, [facility]  Licensed, Certified Genetic Counselor  Office: [phone]  [email or web address]

## 2024-04-24 ENCOUNTER — TELEPHONE (OUTPATIENT)
Dept: ONCOLOGY | Facility: CLINIC | Age: 69
End: 2024-04-24
Payer: MEDICARE

## 2024-04-24 NOTE — TELEPHONE ENCOUNTER
4/24/2024    I called Abigail's  Kevin again today, as Abigail has recently passed away, to discuss Abigail's genetic testing results. As I was unable to reach him, I left a non-detailed voicemail with my name and phone number.     Luyd Barnes MS, [facility]  Licensed, Certified Genetic Counselor  Office: [phone]  [email or web address]

## 2024-04-29 ENCOUNTER — TELEPHONE (OUTPATIENT)
Dept: ONCOLOGY | Facility: CLINIC | Age: 69
End: 2024-04-29
Payer: MEDICARE

## 2024-04-29 NOTE — TELEPHONE ENCOUNTER
4/29/2024    Referring Provider: Jonathan De La Garza MD    Presenting Information:   As Abigail has recently passed away, I spoke to Abigail's  Kevin by phone today to discuss Abigail's genetic testing results. Abigail previously requested that I share this information with her , if she passed away before testing was completed.    I last met with Abigail and her  on 4/1/2024 and her blood was drawn on 4/5/2024. The [facility] Multi-Cancer Panel + Preliminary Evidence Breast/Pancreatic Cancer Genes was ordered from [facility]. This testing was done because of Abigail's personal and family history of cancer.     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 Specifically her mutation is called c.3920T>A (also known as p.Uvm4595Qry or p.V2822K). We discussed that this mutation is associated with a moderately increased risk for colorectal cancer. Of note, this particular mutation does not cause familial adenomatous polyposis (FAP). We discussed the impact of this testing on Abigail's relatives in detail.     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 This variant is called c.1195G>C (also known as p.Vzs564Dip). No other variants or mutations were found in the MITF gene. Given the uncertain significance of this result, medical management decisions should NOT be made based on this test result alone.    MITF VUS Interpretation:  We discussed several different interpretations of this inconclusive test result. It is not clear if this variant in the MITF gene is associated with increased cancer risk.  1. This variant may be a benign change that does not increase cancer risk.  2. This variant may be a harmful mutation that does increase cancer risk.  The laboratory is working to determine if this variant is harmful or benign, and [facility] will contact me if it is reclassified.  Abigail's  "relatives may also carry this VUS. Because it is unclear what, if any, risk is associated with this variant, clinical genetic testing for this MITF variant alone is not recommended for relatives.    Of note, Abigail tested negative for mutations in the following genes by sequencing and deletion/duplication analysis (unless otherwise specified in the test report): AIP, ALK, SHREYAS, AXIN2, BAP1, BARD1, BLM, BMPR1A, BRCA1, BRCA2, BRIP1, CDC73, CDH1, CDK4, CDKN1B, CDKN2A, CHEK2, CTNNA1, DICER1, EGFR, EPCAM, FH, FLCN, GREM1, HOXB13, KIT, LZTR1, MAX, MBD4, MEN1, MET, MLH1, MSH2, MSH3, MSH6, MUTYH, NF1, NF2, NTHL1, PALB2, PDGFRA, PMS2, POLD1, POLE, POT1, SAMNT9U, PTCH1, PTEN, RAD51C, RAD51D, RB1, RET, SDHA, SDHAF2, SDHB, SDHC, SDHD, SMAD4, SMARCA4, SMARCB1, SMARCE1, STK11, SUFU, QSGM421, TP53, TSC1, TSC2, VHL, FANCC, FANCM, NBN, RECQL, PALLD.  We reviewed the autosomal dominant inheritance of these genes.   Abigail did not pass on a mutation in any of these genes to her children based on this test result. Mutations in these genes do not skip generations.      A copy of the test report can be found in the Laboratory tab, dated 4/5/2024, and named \"LABORATORY MISCELLANEOUS ORDER\". The report is scanned in as a linked document.    APC L8123M Cancer Risks:   We discussed that this specific mutation in the APC gene is present in approximately 6-7% of individuals in the [employer]azi [employer] population. Studies have shown that this mutation does not cause other APC-associated conditions, such as familial adenomatous polyposis (FAP).   This particular gene mutation confers up to a 2-fold increased risk for colon cancer (5-10% lifetime risk). This is compared to the 4.2% lifetime risk in the general population.   Potential risks for other cancers are not well defined.     We discussed that given the cancer risks associated with this specific APC mutation, it is very unlikely that it explains Abigail's personal or family history of cancer. Her " cancer history may have been sporadic, or caused by genetic risk factors not identifiable using this genetic test. As such, Abigail's relatives are encouraged to check-in with a genetics provider on a regular basis, as additional genetic testing options may become available in the future.    Cancer Screening and Prevention:  Per current National Comprehensive Cancer Network (NCCN) guidelines, the following screening is recommended for individuals who have the APC N3429K mutation:  Colonoscopy is recommended starting at age 40, or 10 years before the earlier age of colon cancer diagnosis in the family (whichever comes first).   Colonoscopy should then be repeated every 5 years, though the frequency may be modified based on colonoscopy findings.     Other cancer screening based on Abigail's personal and family history:  Abigail's close female relatives remain at increased risk for breast cancer given their family history. Breast cancer screening is generally recommended to begin approximately 10 years younger than the earliest age of breast cancer diagnosis in the family, or at age 40, whichever comes first. In this family, screening may begin at age 40. Breast screening options should be discussed with an individual's primary care provider and a Genetic Counselor, to determine at what age to begin screening, what screening is appropriate, and if additional screening (such as breast MRI) is necessary based on personal/family history factors.  We discussed that at this time, Abigail's close relatives do not meet current guidelines for pancreatic cancer screening based on Abigail's history alone. That being said, Abigail's relatives are encouraged to share the family history and any concerning symptoms with their medical providers, as their providers may have individualized recommendations.  Other population cancer screening options, such as those recommended by the American Cancer Society and NCCN, are also appropriate for Abigail's  "family. These screening recommendations may change if there are changes to the family history of cancer. Final screening recommendations should be made by each individual's primary care provider.    Of note, the above information is based on our current understanding of Abigail's genetic findings. Kevin and Abigail's relatives are encouraged to reach out to me regularly and with any pertinent updates to their personal and/or family history of cancer, as our understanding of the genetic findings in Abigail's family may change over time.     Implications for Family Members:  We reviewed that mutations in the APC gene are inherited in an autosomal dominant pattern.   This means that each of Abigail's children have a 50% chance of inheriting the same mutation.   Likewise, Abigail's sister has a 50% risk of having the same mutation.  Extended relatives may also carry this mutation, and this result should be shared with family members on both sides of [facility] family until it is known from which parent she inherited the mutation.   I am happy to help her relatives connect with a genetic counselor in their area if they would like to discuss testing.    Additional Testing Considerations:  Even though Abigail's genetic testing result was positive for this specific APC mutation, other relatives may carry a different gene mutation associated with hereditary cancer. As such, Abigail's relatives are encouraged to meet with a genetic counselor to discuss their APC single gene versus multi-gene testing options.    Plan:  1. Per Abigail's previous request, Kevin will be provided a copy of her test results.  2. I will provide a \"Dear Relative\" letter for Kevin to share with Abigail's family members.    If Kevin has additional questions, I encouraged him to contact me directly at [phone].     Ludy Barnes MS, [facility] [place] [facility] [place]  Licensed, Certified Genetic Counselor  Office: [phone]  [email or web address]  "

## 2024-05-10 NOTE — PATIENT INSTRUCTIONS
Dear Relative,     The purpose of this letter is to inform you that your relative recently underwent genetic counseling and genetic testing due to the family history of cancer. The testing done through [facility] identified a moderate risk mutation in the APC gene. Specifically, the mutation is called c.3920T>A (p.Z8327R). The accession number linked to your relative's test report is [id number].    A mutation (or change in the genetic code) causes a specific gene to stop working properly. Individuals who have this specific mutation in the APC gene have a moderately increased risk for colon cancer. Of note, this particular APC mutation is considered a  moderate risk  mutation, and is not associated with familial adenomatous polyposis (caused by other mutations in the APC gene).       This particular gene mutation confers up to a 2-fold increased lifetime risk for colon cancer. This equates to a 5-10% lifetime risk as compared to the general population risk of about 4-5%. Risks for other cancers are not well defined. If individuals are found to have this mutation in the APC gene, we would discuss increased colon cancer screening beginning at earlier ages. It is also important to note that both men and women have a 50% chance of passing this mutation on to each of their biological children.    I understand that this may be surprising, unexpected, and even scary news. Because this mutation has been identified in your family, you are at risk for having the same mutation. As mentioned earlier, your children may also be at risk.     Scheduling a genetic counseling appointment does not mean you have to undergo genetic testing. The decision to pursue such testing is a very personal one that is discussed in more detail during the session. Much of cancer genetic counseling is providing valuable information to individuals who are impacted by genetic information such as this.     If you are interested in scheduling a  genetic counseling appointment at [facility] please call [phone] to schedule an appointment. You can also find a genetic counselor close to you or at another health system at [facility]    Sincerely,   Ludy Barnes MS, [facility] [place]  Licensed, Certified Genetic Counselor  Office: [phone]  [email or web address]

## (undated) RX ORDER — FENTANYL CITRATE 50 UG/ML
INJECTION, SOLUTION INTRAMUSCULAR; INTRAVENOUS
Status: DISPENSED
Start: 2024-01-01

## (undated) RX ORDER — SODIUM CHLORIDE 9 MG/ML
INJECTION, SOLUTION INTRAVENOUS
Status: DISPENSED
Start: 2024-01-01

## (undated) RX ORDER — ONDANSETRON 2 MG/ML
INJECTION INTRAMUSCULAR; INTRAVENOUS
Status: DISPENSED
Start: 2024-01-01

## (undated) RX ORDER — LIDOCAINE HYDROCHLORIDE 10 MG/ML
INJECTION, SOLUTION EPIDURAL; INFILTRATION; INTRACAUDAL; PERINEURAL
Status: DISPENSED
Start: 2024-01-01